# Patient Record
Sex: FEMALE | Race: WHITE | NOT HISPANIC OR LATINO
[De-identification: names, ages, dates, MRNs, and addresses within clinical notes are randomized per-mention and may not be internally consistent; named-entity substitution may affect disease eponyms.]

---

## 2018-01-24 ENCOUNTER — APPOINTMENT (OUTPATIENT)
Dept: RADIOLOGY | Facility: IMAGING CENTER | Age: 68
End: 2018-01-24
Payer: MEDICARE

## 2018-01-24 ENCOUNTER — OUTPATIENT (OUTPATIENT)
Dept: OUTPATIENT SERVICES | Facility: HOSPITAL | Age: 68
LOS: 1 days | End: 2018-01-24
Payer: MEDICARE

## 2018-01-24 ENCOUNTER — APPOINTMENT (OUTPATIENT)
Dept: ULTRASOUND IMAGING | Facility: IMAGING CENTER | Age: 68
End: 2018-01-24
Payer: MEDICARE

## 2018-01-24 ENCOUNTER — APPOINTMENT (OUTPATIENT)
Dept: MAMMOGRAPHY | Facility: IMAGING CENTER | Age: 68
End: 2018-01-24
Payer: MEDICARE

## 2018-01-24 DIAGNOSIS — Z00.8 ENCOUNTER FOR OTHER GENERAL EXAMINATION: ICD-10-CM

## 2018-01-24 PROCEDURE — 77063 BREAST TOMOSYNTHESIS BI: CPT | Mod: 26

## 2018-01-24 PROCEDURE — 77067 SCR MAMMO BI INCL CAD: CPT | Mod: 26

## 2018-01-24 PROCEDURE — 76641 ULTRASOUND BREAST COMPLETE: CPT | Mod: 26,50

## 2018-01-24 PROCEDURE — 76641 ULTRASOUND BREAST COMPLETE: CPT

## 2018-01-24 PROCEDURE — 77080 DXA BONE DENSITY AXIAL: CPT | Mod: 26

## 2018-01-24 PROCEDURE — 77063 BREAST TOMOSYNTHESIS BI: CPT

## 2018-01-24 PROCEDURE — 77067 SCR MAMMO BI INCL CAD: CPT

## 2018-01-24 PROCEDURE — 77080 DXA BONE DENSITY AXIAL: CPT

## 2019-04-05 ENCOUNTER — RX RENEWAL (OUTPATIENT)
Age: 69
End: 2019-04-05

## 2019-05-02 ENCOUNTER — MOBILE ON CALL (OUTPATIENT)
Age: 69
End: 2019-05-02

## 2019-11-06 ENCOUNTER — RX RENEWAL (OUTPATIENT)
Age: 69
End: 2019-11-06

## 2022-02-12 ENCOUNTER — NON-APPOINTMENT (OUTPATIENT)
Age: 72
End: 2022-02-12

## 2022-02-12 ENCOUNTER — APPOINTMENT (OUTPATIENT)
Dept: ORTHOPEDIC SURGERY | Facility: CLINIC | Age: 72
End: 2022-02-12
Payer: MEDICARE

## 2022-02-12 VITALS
SYSTOLIC BLOOD PRESSURE: 143 MMHG | BODY MASS INDEX: 31.18 KG/M2 | HEART RATE: 75 BPM | WEIGHT: 163 LBS | HEIGHT: 60.5 IN | DIASTOLIC BLOOD PRESSURE: 71 MMHG

## 2022-02-12 DIAGNOSIS — M25.551 PAIN IN RIGHT HIP: ICD-10-CM

## 2022-02-12 DIAGNOSIS — M16.11 UNILATERAL PRIMARY OSTEOARTHRITIS, RIGHT HIP: ICD-10-CM

## 2022-02-12 PROCEDURE — 73502 X-RAY EXAM HIP UNI 2-3 VIEWS: CPT | Mod: RT

## 2022-02-12 PROCEDURE — 99204 OFFICE O/P NEW MOD 45 MIN: CPT

## 2022-02-12 RX ORDER — ESTRADIOL 10 UG/1
10 TABLET VAGINAL
Qty: 24 | Refills: 1 | Status: DISCONTINUED | COMMUNITY
Start: 2019-04-05 | End: 2022-02-12

## 2022-02-12 RX ORDER — CHROMIUM 200 MCG
TABLET ORAL
Refills: 0 | Status: ACTIVE | COMMUNITY

## 2022-02-21 ENCOUNTER — TRANSCRIPTION ENCOUNTER (OUTPATIENT)
Age: 72
End: 2022-02-21

## 2022-03-11 ENCOUNTER — OUTPATIENT (OUTPATIENT)
Dept: OUTPATIENT SERVICES | Facility: HOSPITAL | Age: 72
LOS: 1 days | End: 2022-03-11
Payer: MEDICARE

## 2022-03-11 VITALS
OXYGEN SATURATION: 95 % | WEIGHT: 160.06 LBS | SYSTOLIC BLOOD PRESSURE: 143 MMHG | TEMPERATURE: 97 F | DIASTOLIC BLOOD PRESSURE: 81 MMHG | HEIGHT: 59 IN | RESPIRATION RATE: 14 BRPM | HEART RATE: 99 BPM

## 2022-03-11 DIAGNOSIS — M25.551 PAIN IN RIGHT HIP: ICD-10-CM

## 2022-03-11 DIAGNOSIS — Z90.89 ACQUIRED ABSENCE OF OTHER ORGANS: Chronic | ICD-10-CM

## 2022-03-11 DIAGNOSIS — M16.11 UNILATERAL PRIMARY OSTEOARTHRITIS, RIGHT HIP: ICD-10-CM

## 2022-03-11 DIAGNOSIS — Z01.818 ENCOUNTER FOR OTHER PREPROCEDURAL EXAMINATION: ICD-10-CM

## 2022-03-11 LAB
A1C WITH ESTIMATED AVERAGE GLUCOSE RESULT: 5.8 % — HIGH (ref 4–5.6)
ALBUMIN SERPL ELPH-MCNC: 3.8 G/DL — SIGNIFICANT CHANGE UP (ref 3.3–5)
ALP SERPL-CCNC: 70 U/L — SIGNIFICANT CHANGE UP (ref 30–120)
ALT FLD-CCNC: 23 U/L DA — SIGNIFICANT CHANGE UP (ref 10–60)
ANION GAP SERPL CALC-SCNC: 10 MMOL/L — SIGNIFICANT CHANGE UP (ref 5–17)
APTT BLD: 30.9 SEC — SIGNIFICANT CHANGE UP (ref 27.5–35.5)
AST SERPL-CCNC: 17 U/L — SIGNIFICANT CHANGE UP (ref 10–40)
BILIRUB SERPL-MCNC: 0.4 MG/DL — SIGNIFICANT CHANGE UP (ref 0.2–1.2)
BUN SERPL-MCNC: 14 MG/DL — SIGNIFICANT CHANGE UP (ref 7–23)
CALCIUM SERPL-MCNC: 9.1 MG/DL — SIGNIFICANT CHANGE UP (ref 8.4–10.5)
CHLORIDE SERPL-SCNC: 103 MMOL/L — SIGNIFICANT CHANGE UP (ref 96–108)
CO2 SERPL-SCNC: 24 MMOL/L — SIGNIFICANT CHANGE UP (ref 22–31)
CREAT SERPL-MCNC: 0.91 MG/DL — SIGNIFICANT CHANGE UP (ref 0.5–1.3)
EGFR: 67 ML/MIN/1.73M2 — SIGNIFICANT CHANGE UP
ESTIMATED AVERAGE GLUCOSE: 120 MG/DL — HIGH (ref 68–114)
GLUCOSE SERPL-MCNC: 151 MG/DL — HIGH (ref 70–99)
HCT VFR BLD CALC: 42.3 % — SIGNIFICANT CHANGE UP (ref 34.5–45)
HGB BLD-MCNC: 13.9 G/DL — SIGNIFICANT CHANGE UP (ref 11.5–15.5)
INR BLD: 0.96 RATIO — SIGNIFICANT CHANGE UP (ref 0.88–1.16)
MCHC RBC-ENTMCNC: 31 PG — SIGNIFICANT CHANGE UP (ref 27–34)
MCHC RBC-ENTMCNC: 32.9 GM/DL — SIGNIFICANT CHANGE UP (ref 32–36)
MCV RBC AUTO: 94.2 FL — SIGNIFICANT CHANGE UP (ref 80–100)
MRSA PCR RESULT.: SIGNIFICANT CHANGE UP
NRBC # BLD: 0 /100 WBCS — SIGNIFICANT CHANGE UP (ref 0–0)
PLATELET # BLD AUTO: 250 K/UL — SIGNIFICANT CHANGE UP (ref 150–400)
POTASSIUM SERPL-MCNC: 4.1 MMOL/L — SIGNIFICANT CHANGE UP (ref 3.5–5.3)
POTASSIUM SERPL-SCNC: 4.1 MMOL/L — SIGNIFICANT CHANGE UP (ref 3.5–5.3)
PROT SERPL-MCNC: 7.5 G/DL — SIGNIFICANT CHANGE UP (ref 6–8.3)
PROTHROM AB SERPL-ACNC: 11 SEC — SIGNIFICANT CHANGE UP (ref 10.5–13.4)
RBC # BLD: 4.49 M/UL — SIGNIFICANT CHANGE UP (ref 3.8–5.2)
RBC # FLD: 13.4 % — SIGNIFICANT CHANGE UP (ref 10.3–14.5)
S AUREUS DNA NOSE QL NAA+PROBE: SIGNIFICANT CHANGE UP
SODIUM SERPL-SCNC: 137 MMOL/L — SIGNIFICANT CHANGE UP (ref 135–145)
WBC # BLD: 4.94 K/UL — SIGNIFICANT CHANGE UP (ref 3.8–10.5)
WBC # FLD AUTO: 4.94 K/UL — SIGNIFICANT CHANGE UP (ref 3.8–10.5)

## 2022-03-11 PROCEDURE — 36415 COLL VENOUS BLD VENIPUNCTURE: CPT

## 2022-03-11 PROCEDURE — 87640 STAPH A DNA AMP PROBE: CPT

## 2022-03-11 PROCEDURE — 85730 THROMBOPLASTIN TIME PARTIAL: CPT

## 2022-03-11 PROCEDURE — 86900 BLOOD TYPING SEROLOGIC ABO: CPT

## 2022-03-11 PROCEDURE — 86850 RBC ANTIBODY SCREEN: CPT

## 2022-03-11 PROCEDURE — 85027 COMPLETE CBC AUTOMATED: CPT

## 2022-03-11 PROCEDURE — 86901 BLOOD TYPING SEROLOGIC RH(D): CPT

## 2022-03-11 PROCEDURE — 93010 ELECTROCARDIOGRAM REPORT: CPT

## 2022-03-11 PROCEDURE — 85610 PROTHROMBIN TIME: CPT

## 2022-03-11 PROCEDURE — 87641 MR-STAPH DNA AMP PROBE: CPT

## 2022-03-11 PROCEDURE — G0463: CPT

## 2022-03-11 PROCEDURE — 80053 COMPREHEN METABOLIC PANEL: CPT

## 2022-03-11 PROCEDURE — 83036 HEMOGLOBIN GLYCOSYLATED A1C: CPT

## 2022-03-11 PROCEDURE — 93005 ELECTROCARDIOGRAM TRACING: CPT

## 2022-03-11 NOTE — H&P PST ADULT - PROBLEM SELECTOR PLAN 1
right total anterior hip replacement, covid swab 3/28-1 pm; preop instructions given, to go for medical clearance

## 2022-03-11 NOTE — H&P PST ADULT - HISTORY OF PRESENT ILLNESS
this is a 70 y/o female who has had right hip pain for about 6 months, worse when walking, had PT which helped; tests show not much cartilage;  to have right hip replacement

## 2022-03-11 NOTE — H&P PST ADULT - NSANTHOSAYNRD_GEN_A_CORE
No. PEE screening performed.  STOP BANG Legend: 0-2 = LOW Risk; 3-4 = INTERMEDIATE Risk; 5-8 = HIGH Risk

## 2022-03-11 NOTE — H&P PST ADULT - NSICDXPASTMEDICALHX_GEN_ALL_CORE_FT
PAST MEDICAL HISTORY:  H/O cardiac arrhythmia unknown type    Hyperlipidemia     Osteoarthritis

## 2022-03-28 ENCOUNTER — OUTPATIENT (OUTPATIENT)
Dept: OUTPATIENT SERVICES | Facility: HOSPITAL | Age: 72
LOS: 1 days | End: 2022-03-28

## 2022-03-28 DIAGNOSIS — Z90.89 ACQUIRED ABSENCE OF OTHER ORGANS: Chronic | ICD-10-CM

## 2022-03-28 DIAGNOSIS — Z20.828 CONTACT WITH AND (SUSPECTED) EXPOSURE TO OTHER VIRAL COMMUNICABLE DISEASES: ICD-10-CM

## 2022-03-28 PROBLEM — E78.5 HYPERLIPIDEMIA, UNSPECIFIED: Chronic | Status: ACTIVE | Noted: 2022-03-11

## 2022-03-28 PROBLEM — M19.90 UNSPECIFIED OSTEOARTHRITIS, UNSPECIFIED SITE: Chronic | Status: ACTIVE | Noted: 2022-03-11

## 2022-03-28 LAB — SARS-COV-2 RNA SPEC QL NAA+PROBE: SIGNIFICANT CHANGE UP

## 2022-03-29 ENCOUNTER — FORM ENCOUNTER (OUTPATIENT)
Age: 72
End: 2022-03-29

## 2022-03-29 NOTE — PATIENT PROFILE ADULT - FUNCTIONAL ASSESSMENT - BASIC MOBILITY ASSESSMENT TYPE
HISTORY AND PHYSICAL/PRE-SEDATION ASSESSMENT    Patient:  Juve Acuña   :  1961  Medical Record No.:  [id number]   Date:  17  Physician:  Katherine Ayon M.D. Facility: [facility]     Nursing History and Physical reviewed and agreed upon. Additional findings:    Allergies:  Review of patient's allergies indicates no known allergies. Home Medications:    Prior to Admission medications    Medication Sig Start Date End Date Taking? Authorizing Provider   medroxyPROGESTERone (PROVERA) 2.5 MG tablet  17   Historical Provider, MD   doxycycline (VIBRAMYCIN) 50 MG capsule  17   Historical Provider, MD   omeprazole (PRILOSEC) 40 MG capsule Take 40 mg by mouth daily. Historical Provider, MD   Nutritional Supplements (JUICE PLUS FIBRE PO) Take  by mouth. Historical Provider, MD   Multiple Vitamins-Minerals (CENTRUM SILVER PO) Take  by mouth. Historical Provider, MD       Vitals: Stable       PHYSICAL EXAM:  HENT: Airway patent and reviewed  Cardiovascular: Normal rate, regular rhythm, normal heart sounds. Pulmonary/Chest: No wheezes. No rhonchi. No rales. Abdominal: Soft. Bowel sounds are normal. No distension. ASA CLASS:         []   I. Normal, healthy adult           [x]   II.  Mild systemic disease            []   III. Severe systemic disease      Sedation plan:   [x]  Local              []  Minimal                  []  General anesthesia    Patient's condition acceptable for planned procedure/sedation. Post Procedure Plan   Return to same level of care   ______________________     The risks and benefits as well as alternatives to the procedure have been discussed with the patient and or family. The patient and or next of kin understands and agrees to proceed.     Katherine Ayon M.D. Admission

## 2022-03-29 NOTE — PATIENT PROFILE ADULT - FALL HARM RISK - UNIVERSAL INTERVENTIONS
Bed in lowest position, wheels locked, appropriate side rails in place/Call bell, personal items and telephone in reach/Instruct patient to call for assistance before getting out of bed or chair/Non-slip footwear when patient is out of bed/[place] to call system/Physically safe environment - no spills, clutter or unnecessary equipment/Purposeful Proactive Rounding/Room/bathroom lighting operational, light cord in reach

## 2022-03-29 NOTE — PATIENT PROFILE ADULT - NSPREOP1_NPOAFTER_GEN_A_NUR
Spoke with [employer] repWong Vaca to contact preop on 11.17.21 for pt arrival time and arrive at scheduled time DOS. 23:00

## 2022-03-30 ENCOUNTER — INPATIENT (INPATIENT)
Facility: HOSPITAL | Age: 72
LOS: 0 days | Discharge: HOME | DRG: 470 | End: 2022-03-31
Attending: ORTHOPAEDIC SURGERY | Admitting: ORTHOPAEDIC SURGERY
Payer: MEDICARE

## 2022-03-30 ENCOUNTER — TRANSCRIPTION ENCOUNTER (OUTPATIENT)
Age: 72
End: 2022-03-30

## 2022-03-30 ENCOUNTER — RESULT REVIEW (OUTPATIENT)
Age: 72
End: 2022-03-30

## 2022-03-30 ENCOUNTER — APPOINTMENT (OUTPATIENT)
Dept: ORTHOPEDIC SURGERY | Facility: HOSPITAL | Age: 72
End: 2022-03-30

## 2022-03-30 VITALS
HEART RATE: 78 BPM | OXYGEN SATURATION: 100 % | TEMPERATURE: 98 F | WEIGHT: 156.75 LBS | RESPIRATION RATE: 13 BRPM | SYSTOLIC BLOOD PRESSURE: 127 MMHG | DIASTOLIC BLOOD PRESSURE: 68 MMHG | HEIGHT: 60 IN

## 2022-03-30 DIAGNOSIS — M16.11 UNILATERAL PRIMARY OSTEOARTHRITIS, RIGHT HIP: ICD-10-CM

## 2022-03-30 DIAGNOSIS — Z90.89 ACQUIRED ABSENCE OF OTHER ORGANS: Chronic | ICD-10-CM

## 2022-03-30 LAB — ABO RH CONFIRMATION: SIGNIFICANT CHANGE UP

## 2022-03-30 PROCEDURE — 88311 DECALCIFY TISSUE: CPT | Mod: 26

## 2022-03-30 PROCEDURE — 88305 TISSUE EXAM BY PATHOLOGIST: CPT | Mod: 26

## 2022-03-30 PROCEDURE — 99222 1ST HOSP IP/OBS MODERATE 55: CPT

## 2022-03-30 PROCEDURE — 27130 TOTAL HIP ARTHROPLASTY: CPT | Mod: RT

## 2022-03-30 DEVICE — CUP ACET EMPOWR CLUSTER 48MM ALPHA D: Type: IMPLANTABLE DEVICE | Site: RIGHT | Status: FUNCTIONAL

## 2022-03-30 DEVICE — SCREW ACET SZ 6.5X30MM: Type: IMPLANTABLE DEVICE | Site: RIGHT | Status: FUNCTIONAL

## 2022-03-30 DEVICE — HEAD FEM OPTION CERAMIC DELTA 32MM: Type: IMPLANTABLE DEVICE | Site: RIGHT | Status: FUNCTIONAL

## 2022-03-30 DEVICE — SLEEVE BIOLOX DELTA OPTION SZ -3: Type: IMPLANTABLE DEVICE | Site: RIGHT | Status: FUNCTIONAL

## 2022-03-30 DEVICE — IMPLANTABLE DEVICE: Type: IMPLANTABLE DEVICE | Site: RIGHT | Status: FUNCTIONAL

## 2022-03-30 DEVICE — LINER ACET EMPOWR HXE PLUS HOOD 10 DEG 32MM ALPHA D: Type: IMPLANTABLE DEVICE | Site: RIGHT | Status: FUNCTIONAL

## 2022-03-30 DEVICE — BONE WAX 2.5GM: Type: IMPLANTABLE DEVICE | Site: RIGHT | Status: FUNCTIONAL

## 2022-03-30 DEVICE — KWIRE THREADED 4.8MM 6/PK: Type: IMPLANTABLE DEVICE | Site: RIGHT | Status: FUNCTIONAL

## 2022-03-30 RX ORDER — CHLORHEXIDINE GLUCONATE 213 G/1000ML
1 SOLUTION TOPICAL ONCE
Refills: 0 | Status: COMPLETED | OUTPATIENT
Start: 2022-03-30 | End: 2022-03-30

## 2022-03-30 RX ORDER — CELECOXIB 200 MG/1
1 CAPSULE ORAL
Qty: 60 | Refills: 0
Start: 2022-03-30 | End: 2022-04-28

## 2022-03-30 RX ORDER — SODIUM CHLORIDE 9 MG/ML
500 INJECTION INTRAMUSCULAR; INTRAVENOUS; SUBCUTANEOUS ONCE
Refills: 0 | Status: COMPLETED | OUTPATIENT
Start: 2022-03-30 | End: 2022-03-30

## 2022-03-30 RX ORDER — CEFAZOLIN SODIUM 1 G
2000 VIAL (EA) INJECTION EVERY 8 HOURS
Refills: 0 | Status: COMPLETED | OUTPATIENT
Start: 2022-03-30 | End: 2022-03-31

## 2022-03-30 RX ORDER — DEXAMETHASONE 0.5 MG/5ML
8 ELIXIR ORAL ONCE
Refills: 0 | Status: COMPLETED | OUTPATIENT
Start: 2022-03-31 | End: 2022-03-31

## 2022-03-30 RX ORDER — ASPIRIN/CALCIUM CARB/MAGNESIUM 324 MG
81 TABLET ORAL EVERY 12 HOURS
Refills: 0 | Status: DISCONTINUED | OUTPATIENT
Start: 2022-03-31 | End: 2022-03-31

## 2022-03-30 RX ORDER — TRANEXAMIC ACID 100 MG/ML
1000 INJECTION, SOLUTION INTRAVENOUS ONCE
Refills: 0 | Status: COMPLETED | OUTPATIENT
Start: 2022-03-30 | End: 2022-03-30

## 2022-03-30 RX ORDER — APREPITANT 80 MG/1
40 CAPSULE ORAL ONCE
Refills: 0 | Status: COMPLETED | OUTPATIENT
Start: 2022-03-30 | End: 2022-03-30

## 2022-03-30 RX ORDER — ACETAMINOPHEN 500 MG
1000 TABLET ORAL EVERY 8 HOURS
Refills: 0 | Status: DISCONTINUED | OUTPATIENT
Start: 2022-03-31 | End: 2022-03-31

## 2022-03-30 RX ORDER — HYDROMORPHONE HYDROCHLORIDE 2 MG/ML
0.5 INJECTION INTRAMUSCULAR; INTRAVENOUS; SUBCUTANEOUS
Refills: 0 | Status: DISCONTINUED | OUTPATIENT
Start: 2022-03-30 | End: 2022-03-31

## 2022-03-30 RX ORDER — MAGNESIUM HYDROXIDE 400 MG/1
30 TABLET, CHEWABLE ORAL DAILY
Refills: 0 | Status: DISCONTINUED | OUTPATIENT
Start: 2022-03-30 | End: 2022-03-31

## 2022-03-30 RX ORDER — ONDANSETRON 8 MG/1
4 TABLET, FILM COATED ORAL ONCE
Refills: 0 | Status: DISCONTINUED | OUTPATIENT
Start: 2022-03-30 | End: 2022-03-30

## 2022-03-30 RX ORDER — ONDANSETRON 8 MG/1
4 TABLET, FILM COATED ORAL EVERY 6 HOURS
Refills: 0 | Status: DISCONTINUED | OUTPATIENT
Start: 2022-03-30 | End: 2022-03-31

## 2022-03-30 RX ORDER — INFLUENZA VIRUS VACCINE 15; 15; 15; 15 UG/.5ML; UG/.5ML; UG/.5ML; UG/.5ML
0.7 SUSPENSION INTRAMUSCULAR ONCE
Refills: 0 | Status: DISCONTINUED | OUTPATIENT
Start: 2022-03-30 | End: 2022-03-31

## 2022-03-30 RX ORDER — CELECOXIB 200 MG/1
200 CAPSULE ORAL EVERY 12 HOURS
Refills: 0 | Status: DISCONTINUED | OUTPATIENT
Start: 2022-03-30 | End: 2022-03-31

## 2022-03-30 RX ORDER — METOPROLOL TARTRATE 50 MG
25 TABLET ORAL DAILY
Refills: 0 | Status: DISCONTINUED | OUTPATIENT
Start: 2022-03-30 | End: 2022-03-31

## 2022-03-30 RX ORDER — SENNA PLUS 8.6 MG/1
2 TABLET ORAL
Qty: 0 | Refills: 0 | DISCHARGE
Start: 2022-03-30

## 2022-03-30 RX ORDER — OXYCODONE HYDROCHLORIDE 5 MG/1
5 TABLET ORAL
Refills: 0 | Status: DISCONTINUED | OUTPATIENT
Start: 2022-03-30 | End: 2022-03-31

## 2022-03-30 RX ORDER — ACETAMINOPHEN 500 MG
1000 TABLET ORAL ONCE
Refills: 0 | Status: COMPLETED | OUTPATIENT
Start: 2022-03-30 | End: 2022-03-30

## 2022-03-30 RX ORDER — ASPIRIN/CALCIUM CARB/MAGNESIUM 324 MG
1 TABLET ORAL
Qty: 83 | Refills: 0
Start: 2022-03-30 | End: 2022-05-10

## 2022-03-30 RX ORDER — ACETAMINOPHEN 500 MG
1000 TABLET ORAL EVERY 6 HOURS
Refills: 0 | Status: COMPLETED | OUTPATIENT
Start: 2022-03-30 | End: 2022-03-30

## 2022-03-30 RX ORDER — ACETAMINOPHEN 500 MG
2 TABLET ORAL
Qty: 0 | Refills: 0 | DISCHARGE
Start: 2022-03-30

## 2022-03-30 RX ORDER — POLYETHYLENE GLYCOL 3350 17 G/17G
17 POWDER, FOR SOLUTION ORAL AT BEDTIME
Refills: 0 | Status: DISCONTINUED | OUTPATIENT
Start: 2022-03-30 | End: 2022-03-31

## 2022-03-30 RX ORDER — ASPIRIN/CALCIUM CARB/MAGNESIUM 324 MG
1 TABLET ORAL
Qty: 0 | Refills: 0 | DISCHARGE

## 2022-03-30 RX ORDER — ATORVASTATIN CALCIUM 80 MG/1
10 TABLET, FILM COATED ORAL AT BEDTIME
Refills: 0 | Status: DISCONTINUED | OUTPATIENT
Start: 2022-03-30 | End: 2022-03-31

## 2022-03-30 RX ORDER — PANTOPRAZOLE SODIUM 20 MG/1
40 TABLET, DELAYED RELEASE ORAL
Refills: 0 | Status: DISCONTINUED | OUTPATIENT
Start: 2022-03-30 | End: 2022-03-31

## 2022-03-30 RX ORDER — SODIUM CHLORIDE 9 MG/ML
1000 INJECTION, SOLUTION INTRAVENOUS
Refills: 0 | Status: DISCONTINUED | OUTPATIENT
Start: 2022-03-30 | End: 2022-03-30

## 2022-03-30 RX ORDER — POLYETHYLENE GLYCOL 3350 17 G/17G
17 POWDER, FOR SOLUTION ORAL
Qty: 0 | Refills: 0 | DISCHARGE
Start: 2022-03-30

## 2022-03-30 RX ORDER — OMEPRAZOLE 10 MG/1
1 CAPSULE, DELAYED RELEASE ORAL
Qty: 30 | Refills: 1
Start: 2022-03-30 | End: 2022-05-28

## 2022-03-30 RX ORDER — HYDROMORPHONE HYDROCHLORIDE 2 MG/ML
0.5 INJECTION INTRAMUSCULAR; INTRAVENOUS; SUBCUTANEOUS
Refills: 0 | Status: DISCONTINUED | OUTPATIENT
Start: 2022-03-30 | End: 2022-03-30

## 2022-03-30 RX ORDER — OXYCODONE HYDROCHLORIDE 5 MG/1
10 TABLET ORAL
Refills: 0 | Status: DISCONTINUED | OUTPATIENT
Start: 2022-03-30 | End: 2022-03-31

## 2022-03-30 RX ORDER — SODIUM CHLORIDE 9 MG/ML
1000 INJECTION, SOLUTION INTRAVENOUS
Refills: 0 | Status: DISCONTINUED | OUTPATIENT
Start: 2022-03-30 | End: 2022-03-31

## 2022-03-30 RX ORDER — CEFAZOLIN SODIUM 1 G
2000 VIAL (EA) INJECTION ONCE
Refills: 0 | Status: COMPLETED | OUTPATIENT
Start: 2022-03-30 | End: 2022-03-30

## 2022-03-30 RX ORDER — SENNA PLUS 8.6 MG/1
2 TABLET ORAL AT BEDTIME
Refills: 0 | Status: DISCONTINUED | OUTPATIENT
Start: 2022-03-30 | End: 2022-03-31

## 2022-03-30 RX ADMIN — CELECOXIB 200 MILLIGRAM(S): 200 CAPSULE ORAL at 22:58

## 2022-03-30 RX ADMIN — CHLORHEXIDINE GLUCONATE 1 APPLICATION(S): 213 SOLUTION TOPICAL at 08:33

## 2022-03-30 RX ADMIN — POLYETHYLENE GLYCOL 3350 17 GRAM(S): 17 POWDER, FOR SOLUTION ORAL at 21:55

## 2022-03-30 RX ADMIN — APREPITANT 40 MILLIGRAM(S): 80 CAPSULE ORAL at 08:33

## 2022-03-30 RX ADMIN — SODIUM CHLORIDE 100 MILLILITER(S): 9 INJECTION, SOLUTION INTRAVENOUS at 17:37

## 2022-03-30 RX ADMIN — Medication 1000 MILLIGRAM(S): at 23:27

## 2022-03-30 RX ADMIN — SODIUM CHLORIDE 1000 MILLILITER(S): 9 INJECTION INTRAMUSCULAR; INTRAVENOUS; SUBCUTANEOUS at 17:13

## 2022-03-30 RX ADMIN — Medication 400 MILLIGRAM(S): at 17:37

## 2022-03-30 RX ADMIN — CELECOXIB 200 MILLIGRAM(S): 200 CAPSULE ORAL at 21:55

## 2022-03-30 RX ADMIN — SODIUM CHLORIDE 1000 MILLILITER(S): 9 INJECTION INTRAMUSCULAR; INTRAVENOUS; SUBCUTANEOUS at 12:32

## 2022-03-30 RX ADMIN — SENNA PLUS 2 TABLET(S): 8.6 TABLET ORAL at 21:55

## 2022-03-30 RX ADMIN — SODIUM CHLORIDE 75 MILLILITER(S): 9 INJECTION, SOLUTION INTRAVENOUS at 12:31

## 2022-03-30 RX ADMIN — Medication 100 MILLIGRAM(S): at 17:37

## 2022-03-30 RX ADMIN — Medication 1000 MILLIGRAM(S): at 17:38

## 2022-03-30 RX ADMIN — Medication 400 MILLIGRAM(S): at 23:27

## 2022-03-30 NOTE — PHYSICAL THERAPY INITIAL EVALUATION ADULT - GAIT DEVIATIONS NOTED, PT EVAL
decreased gui/decreased velocity of limb motion/decreased step length/decreased weight-shifting ability

## 2022-03-30 NOTE — PRE-OP CHECKLIST - LOOSE TEETH
Due for 6 month follow up  Last 5/27/21.    Patient informed via [employer].    Laverne Rebolledo RN    
no

## 2022-03-30 NOTE — BRIEF OPERATIVE NOTE - NSICDXBRIEFPREOP_GEN_ALL_CORE_FT
PRE-OP DIAGNOSIS:  DJD (degenerative joint disease) of pelvis 30-Mar-2022 12:01:19 Right Jasbir Diaz

## 2022-03-30 NOTE — DISCHARGE NOTE PROVIDER - PROVIDER TOKENS
PROVIDER:[TOKEN:[[id number]],SCHEDULEDAPPT:[04/14/2022],SCHEDULEDAPPTTIME:[10:30 AM],ESTABLISHEDPATIENT:[T]]

## 2022-03-30 NOTE — CONSULT NOTE ADULT - ASSESSMENT
72 y/o female who has had right hip pain for about 6 months s/p THR    Aftercare following surgery  -WBAT  -PT/OT  -Early ambulation  -Pain management  -Incentive Spirometry  -DVT ppx as per ortho    HTN  Metoprolol per ortho protocol     HLD   Statin    Thank you for allowing us to participate in the care of this patient. Our team will continue to follow along with you. Further recommendations to follow pending the clinical course.

## 2022-03-30 NOTE — DISCHARGE NOTE NURSING/CASE MANAGEMENT/SOCIAL WORK - NSSCCONTNUM_GEN_ALL_CORE
[facility] ([facility])   Please contact the home care agency at [phone] if you have not heard from them by 12 noon on the day after your hospital discharge.

## 2022-03-30 NOTE — PROGRESS NOTE ADULT - SUBJECTIVE AND OBJECTIVE BOX
Post Op     ELLA PIÑA      71y        Female                                                                                                                 T(C): 36.3 (03-30-22 @ 12:00), Max: 36.4 (03-30-22 @ 08:12)  HR: 65 (03-30-22 @ 13:30) (59 - 78)  BP: 119/63 (03-30-22 @ 13:30) (93/76 - 127/68)  RR: 18 (03-30-22 @ 13:30) (13 - 19)  SpO2: 100% (03-30-22 @ 13:30) (99% - 100%)  Wt(kg): --    S/P right total hip replacement     Patient denies shortness of breath, chest pain, dyspnea, No complaints  Pain is 3 /10    Physical Exam    Extremity: Bilaterally:  No holmon                                           No Cord                                          PAS on                                          Neurovascular intact                                          Motor intact EHL/FHL                                          Sensation intact                                          Pulses intact DP/PT                                         Calves Soft                                         Dressing Clean / Dry / Intact                                         Capillary refill with 5 seconds                A/P  -- S/P total hip replacement right    -  Medicine To Follow   - DVT prophylaxis PAS  - PT & OT   - Analagesia  - Incentive Spirometry  - Discharge Planning  - Safety Precautions  -  CBC , BMP daily

## 2022-03-30 NOTE — DISCHARGE NOTE NURSING/CASE MANAGEMENT/SOCIAL WORK - NSSCNAMETXT_GEN_ALL_CORE
[facility] [phone] [facility]

## 2022-03-30 NOTE — DISCHARGE NOTE PROVIDER - HOSPITAL COURSE
This patient was admitted to [facility] with a history of severe degenerative joint disease of the right hip.  Patient underwent Pre-Surgical Testing and was medically cleared to undergo elective procedure. Patient underwent right anterior THR by Dr. Zoraida Muñoz on 3/30/22. Procedure was well tolerated.  No operative or srinivas-operative complications arose during patient's hospital course.  Patient received antibiotic according to SCIP guidelines for infection prevention.  Aspirin 81mg q 12 h was given for DVT prophylaxis, in addition to the use of SCDs.  Anesthesia, Medical Hospitalist, Physical Therapy and Occupational Therapy were consulted. Patient is stable for discharge with a good prognosis.  Appropriate discharge instructions and medications are provided in this document.

## 2022-03-30 NOTE — DISCHARGE NOTE PROVIDER - CARE PROVIDER_API CALL
Marylou Young (NP; RN)  NP in Adult Health  [address], [address]  [place] [address]  Phone: [phone]  Fax: [phone]  Established Patient  Scheduled Appointment: 04/14/2022 10:30 AM

## 2022-03-30 NOTE — DISCHARGE NOTE PROVIDER - NSDCCPGOAL_GEN_ALL_CORE_FT
To get better and follow your care plan as instructed. Improve ambulation, ADLs and quality of life.  Shoulder/back/chest pain relapsed ALL

## 2022-03-30 NOTE — DISCHARGE NOTE PROVIDER - NSDCCPCAREPLAN_GEN_ALL_CORE_FT
PRINCIPAL DISCHARGE DIAGNOSIS  Diagnosis: Primary osteoarthritis of right hip  Assessment and Plan of Treatment: You have had an Anterior Total Hip Replacement. Follow instructions given to you by your surgeon.   PT/OT Total Hip Protocol; Ambulation, transfers, stairs, & ADLs  Full weight bearing both legs; Walker/cane use as instructed by PT/OT  Anterior THR precautions for 4 weeks: No straight leg raise; No external rotation of hip when extended-standing or lying flat; No hyperextension of hip when standing (kickback)  • Ice 20 minutes several times daily with at least a 20 minute break in between icing sessions  Silverlon dressing is waterproof.  You may shower, but do not aim shower stream at surgical site. Pat dry after shower.   Remove Silverlon dressing on postop day #7. May shower after Silverlon removal.  Keep incision clean. DO NOT APPLY ANYTHING to incision site (salves/ointments/creams).  May shower.  Do not scrub incision site. Pat dry after shower. Prineo dressing will be removed at surgeon's office during first post-op appointment, 2 weeks after surgery.

## 2022-03-30 NOTE — DISCHARGE NOTE PROVIDER - NSDCMRMEDTOKEN_GEN_ALL_CORE_FT
aspirin 81 mg oral delayed release tablet: 1 tab orally every 12 hours. Take 2 hours before Celebrex.   aspirin 81 mg oral tablet: 1 tab(s) orally 2 times a week  celecoxib 200 mg oral capsule: 1 cap orally every 12 hours. Take 2 hours after Aspirin.  metoprolol succinate 25 mg oral capsule, extended release: 1 cap(s) orally once a day  omeprazole 20 mg oral delayed release capsule: 1 cap orally once a day   pravastatin 40 mg oral tablet: 1 tab(s) orally once a day  Vitamin D2 1.25 mg (50,000 intl units) oral capsule: 1 cap(s) orally every 2 weeks   acetaminophen 500 mg oral tablet: 2 tab(s) orally every 8 hours  aspirin 81 mg oral delayed release tablet: 1 tab orally every 12 hours. Take 2 hours before Celebrex.   celecoxib 200 mg oral capsule: 1 cap orally every 12 hours. Take 2 hours after Aspirin.  metoprolol succinate 25 mg oral capsule, extended release: 1 cap(s) orally once a day  omeprazole 20 mg oral delayed release capsule: 1 cap orally once a day   oxyCODONE 5 mg oral tablet: 1-2 tab(s) orally every 4 hours, As Needed -Moderate to severe Pain MDD:6  [id number]  polyethylene glycol 3350 oral powder for reconstitution: 17 gram(s) orally once a day (at bedtime), As Needed  pravastatin 40 mg oral tablet: 1 tab(s) orally once a day  senna oral tablet: 2 tab(s) orally once a day (at bedtime), As Needed  Vitamin D2 1.25 mg (50,000 intl units) oral capsule: 1 cap(s) orally every 2 weeks

## 2022-03-30 NOTE — BRIEF OPERATIVE NOTE - NSICDXBRIEFPOSTOP_GEN_ALL_CORE_FT
POST-OP DIAGNOSIS:  DJD (degenerative joint disease) of pelvis 30-Mar-2022 12:01:21 Right Jasbir Diaz

## 2022-03-30 NOTE — DISCHARGE NOTE PROVIDER - NSDCFUSCHEDAPPT_GEN_ALL_CORE_FT
ELLA PIÑA ; 04/14/2022 ; [facility] [address] [place] [place]  ELLA PIÑA ; 05/31/2022 ; [facility] [address] [place] [address]

## 2022-03-30 NOTE — PHYSICAL THERAPY INITIAL EVALUATION ADULT - ADDITIONAL COMMENTS
Pt will be living with friend post d/c. House has 2 VALERIO without handrail and no steps inside. Pt was independent prior to surgery. Pt has RW.

## 2022-03-30 NOTE — DISCHARGE NOTE NURSING/CASE MANAGEMENT/SOCIAL WORK - NSDCDMETYPESERV_GEN_ALL_CORE_FT
You confirmed that you a rolling walker and commode at home You confirmed that you have a rolling walker and commode at home

## 2022-03-30 NOTE — DISCHARGE NOTE NURSING/CASE MANAGEMENT/SOCIAL WORK - PATIENT PORTAL LINK FT
You can access the FollowMyHealth Patient Portal offered by [facility] by registering at the following website: [email or web address]. By joining [employer]’s FollowMyHealth portal, you will also be able to view your health information using other applications (apps) compatible with our system.

## 2022-03-30 NOTE — CONSULT NOTE ADULT - SUBJECTIVE AND OBJECTIVE BOX
Patient is a 71y old  Female who presents with a chief complaint of Right anterior THR for severe right hip OA.   (30 Mar 2022 12:19)      FROM ADMISSION H+P:   HPI: 70 y/o female who has had right hip pain for about 6 months, worse when walking, had PT which helped; tests show not much cartilage. Patient seen post op. Tolerated procedure well without complications. C/o mild pain at surgical site. Denies any new complaints.      ----  PAST MEDICAL & SURGICAL HISTORY:  H/O cardiac arrhythmia  unknown type    Hyperlipidemia    Osteoarthritis    S/P tonsillectomy        ----  Home medications:  aspirin 81 mg oral tablet: 1 tab(s) orally 2 times a week (30 Mar 2022 08:31)  metoprolol succinate 25 mg oral capsule, extended release: 1 cap(s) orally once a day (30 Mar 2022 08:31)  pravastatin 40 mg oral tablet: 1 tab(s) orally once a day (30 Mar 2022 08:31)  Vitamin D2 1.25 mg (50,000 intl units) oral capsule: 1 cap(s) orally every 2 weeks (30 Mar 2022 08:31)    ----  FAMILY HISTORY:  FH: hyperlipidemia (Mother)        ----  Allergies    No Known Allergies    Intolerances        ----  Social History:  Denies current alcohol, tobacco or drug use     ----  REVIEW OF SYSTEMS:  CONSTITUTIONAL: denies fever, chills, fatigue, weakness  HEENT: denies blurred vision, sore throat  SKIN: denies new lesions, rash  CARDIOVASCULAR: denies chest pain, chest pressure, palpitations  RESPIRATORY: denies shortness of breath, sputum production  GASTROINTESTINAL: denies nausea, vomiting, diarrhea, abdominal pain  GENITOURINARY: denies dysuria, discharge  NEUROLOGICAL: denies numbness, headache, focal weakness  MUSCULOSKELETAL: denies new joint pain, muscle aches  HEMATOLOGIC: denies gross bleeding, bruising    ----  PHYSICAL EXAM:  GENERAL: patient appears well, no acute distress, appropriately interactive  EYES: sclera clear, no exudates  LUNGS: good air entry bilaterally, clear to auscultation, symmetric breath sounds  HEART: soft S1/S2, regular rate and rhythm, no murmurs noted, no noted edema to bilateral lower extremities  GASTROINTESTINAL: abdomen is soft, nontender, nondistended, normoactive bowel sounds, no palpable masses  INTEGUMENT: good skin turgor, appropriate for ethnicity, appears well perfused, no jaundice noted  MUSCULOSKELETAL: dressing c/d/i, no clubbing or cyanosis, no obvious deformity  NEUROLOGIC: awake, alert, oriented x3, good muscle tone in 4 extremities, no obvious sensory deficits  PSYCHIATRIC: mood is good, affect is congruent with mood, linear and logical thought process    T(C): 36.3 (03-30-22 @ 12:00), Max: 36.4 (03-30-22 @ 08:12)  HR: 64 (03-30-22 @ 15:00) (59 - 78)  BP: 103/48 (03-30-22 @ 15:00) (93/76 - 127/68)  RR: 18 (03-30-22 @ 15:00) (13 - 20)  SpO2: 99% (03-30-22 @ 15:00) (99% - 100%)  Wt(kg): --    ----  INPATIENT MEDICATIONS  HYDROmorphone  Injectable 0.5 milliGRAM(s) IV Push every 10 minutes PRN  influenza  Vaccine (HIGH DOSE) 0.7 milliLiter(s) IntraMuscular once  lactated ringers. 1000 milliLiter(s) IV Continuous <Continuous>  ondansetron Injectable 4 milliGRAM(s) IV Push once PRN      ----  I&O's Summary    30 Mar 2022 07:01  -  30 Mar 2022 15:09  --------------------------------------------------------  IN: 2000 mL / OUT: 150 mL / NET: 1850 mL        LABS:              CAPILLARY BLOOD GLUCOSE                    ----  Personally reviewed:  Vital sign trends: [ x ] yes    [  ] no     [  ] n/a  Laboratory results: [x  ] yes    [  ] no     [  ] n/a

## 2022-03-31 VITALS
RESPIRATION RATE: 18 BRPM | OXYGEN SATURATION: 96 % | SYSTOLIC BLOOD PRESSURE: 138 MMHG | DIASTOLIC BLOOD PRESSURE: 69 MMHG | TEMPERATURE: 98 F | HEART RATE: 68 BPM

## 2022-03-31 LAB
ANION GAP SERPL CALC-SCNC: 10 MMOL/L — SIGNIFICANT CHANGE UP (ref 5–17)
BUN SERPL-MCNC: 12 MG/DL — SIGNIFICANT CHANGE UP (ref 7–23)
CALCIUM SERPL-MCNC: 8.5 MG/DL — SIGNIFICANT CHANGE UP (ref 8.4–10.5)
CHLORIDE SERPL-SCNC: 107 MMOL/L — SIGNIFICANT CHANGE UP (ref 96–108)
CO2 SERPL-SCNC: 22 MMOL/L — SIGNIFICANT CHANGE UP (ref 22–31)
CREAT SERPL-MCNC: 0.82 MG/DL — SIGNIFICANT CHANGE UP (ref 0.5–1.3)
EGFR: 75 ML/MIN/1.73M2 — SIGNIFICANT CHANGE UP
GLUCOSE SERPL-MCNC: 129 MG/DL — HIGH (ref 70–99)
HCT VFR BLD CALC: 35.6 % — SIGNIFICANT CHANGE UP (ref 34.5–45)
HGB BLD-MCNC: 11.8 G/DL — SIGNIFICANT CHANGE UP (ref 11.5–15.5)
MCHC RBC-ENTMCNC: 31.6 PG — SIGNIFICANT CHANGE UP (ref 27–34)
MCHC RBC-ENTMCNC: 33.1 GM/DL — SIGNIFICANT CHANGE UP (ref 32–36)
MCV RBC AUTO: 95.4 FL — SIGNIFICANT CHANGE UP (ref 80–100)
NRBC # BLD: 0 /100 WBCS — SIGNIFICANT CHANGE UP (ref 0–0)
PLATELET # BLD AUTO: 190 K/UL — SIGNIFICANT CHANGE UP (ref 150–400)
POTASSIUM SERPL-MCNC: 4.1 MMOL/L — SIGNIFICANT CHANGE UP (ref 3.5–5.3)
POTASSIUM SERPL-SCNC: 4.1 MMOL/L — SIGNIFICANT CHANGE UP (ref 3.5–5.3)
RBC # BLD: 3.73 M/UL — LOW (ref 3.8–5.2)
RBC # FLD: 13.4 % — SIGNIFICANT CHANGE UP (ref 10.3–14.5)
SODIUM SERPL-SCNC: 139 MMOL/L — SIGNIFICANT CHANGE UP (ref 135–145)
WBC # BLD: 10.57 K/UL — HIGH (ref 3.8–10.5)
WBC # FLD AUTO: 10.57 K/UL — HIGH (ref 3.8–10.5)

## 2022-03-31 PROCEDURE — C1776: CPT

## 2022-03-31 PROCEDURE — 27130 TOTAL HIP ARTHROPLASTY: CPT

## 2022-03-31 PROCEDURE — 97165 OT EVAL LOW COMPLEX 30 MIN: CPT

## 2022-03-31 PROCEDURE — 80048 BASIC METABOLIC PNL TOTAL CA: CPT

## 2022-03-31 PROCEDURE — 36415 COLL VENOUS BLD VENIPUNCTURE: CPT

## 2022-03-31 PROCEDURE — 97161 PT EVAL LOW COMPLEX 20 MIN: CPT

## 2022-03-31 PROCEDURE — 85027 COMPLETE CBC AUTOMATED: CPT

## 2022-03-31 PROCEDURE — 97530 THERAPEUTIC ACTIVITIES: CPT

## 2022-03-31 PROCEDURE — 99238 HOSP IP/OBS DSCHRG MGMT 30/<: CPT

## 2022-03-31 PROCEDURE — U0003: CPT

## 2022-03-31 PROCEDURE — 88305 TISSUE EXAM BY PATHOLOGIST: CPT

## 2022-03-31 PROCEDURE — C1889: CPT

## 2022-03-31 PROCEDURE — C1713: CPT

## 2022-03-31 PROCEDURE — 97110 THERAPEUTIC EXERCISES: CPT

## 2022-03-31 PROCEDURE — U0005: CPT

## 2022-03-31 PROCEDURE — 97116 GAIT TRAINING THERAPY: CPT

## 2022-03-31 PROCEDURE — 88311 DECALCIFY TISSUE: CPT

## 2022-03-31 PROCEDURE — 76000 FLUOROSCOPY <1 HR PHYS/QHP: CPT

## 2022-03-31 PROCEDURE — 94664 DEMO&/EVAL PT USE INHALER: CPT

## 2022-03-31 RX ORDER — OXYCODONE HYDROCHLORIDE 5 MG/1
1 TABLET ORAL
Qty: 42 | Refills: 0
Start: 2022-03-31 | End: 2022-04-06

## 2022-03-31 RX ADMIN — Medication 25 MILLIGRAM(S): at 05:14

## 2022-03-31 RX ADMIN — CELECOXIB 200 MILLIGRAM(S): 200 CAPSULE ORAL at 09:40

## 2022-03-31 RX ADMIN — Medication 1000 MILLIGRAM(S): at 05:21

## 2022-03-31 RX ADMIN — Medication 1000 MILLIGRAM(S): at 13:56

## 2022-03-31 RX ADMIN — Medication 1000 MILLIGRAM(S): at 05:14

## 2022-03-31 RX ADMIN — PANTOPRAZOLE SODIUM 40 MILLIGRAM(S): 20 TABLET, DELAYED RELEASE ORAL at 05:14

## 2022-03-31 RX ADMIN — CELECOXIB 200 MILLIGRAM(S): 200 CAPSULE ORAL at 09:41

## 2022-03-31 RX ADMIN — Medication 81 MILLIGRAM(S): at 05:14

## 2022-03-31 RX ADMIN — Medication 101.6 MILLIGRAM(S): at 05:14

## 2022-03-31 RX ADMIN — Medication 100 MILLIGRAM(S): at 01:35

## 2022-03-31 NOTE — PROGRESS NOTE ADULT - SUBJECTIVE AND OBJECTIVE BOX
Patient is a 71y old  Female who presents with a chief complaint of right hip pain--for right ant DEEDEE (31 Mar 2022 07:38)      INTERVAL HPI/OVERNIGHT EVENTS: Patient seen and examined at bedside. No overnight events.      MEDICATIONS  (STANDING):  acetaminophen     Tablet .. 1000 milliGRAM(s) Oral every 8 hours  aspirin enteric coated 81 milliGRAM(s) Oral every 12 hours  atorvastatin 10 milliGRAM(s) Oral at bedtime  celecoxib 200 milliGRAM(s) Oral every 12 hours  influenza  Vaccine (HIGH DOSE) 0.7 milliLiter(s) IntraMuscular once  lactated ringers. 1000 milliLiter(s) (100 mL/Hr) IV Continuous <Continuous>  metoprolol succinate ER 25 milliGRAM(s) Oral daily  pantoprazole    Tablet 40 milliGRAM(s) Oral before breakfast  polyethylene glycol 3350 17 Gram(s) Oral at bedtime  senna 2 Tablet(s) Oral at bedtime    MEDICATIONS  (PRN):  HYDROmorphone  Injectable 0.5 milliGRAM(s) IV Push every 3 hours PRN breakthrough pain  magnesium hydroxide Suspension 30 milliLiter(s) Oral daily PRN Constipation  ondansetron Injectable 4 milliGRAM(s) IV Push every 6 hours PRN Nausea and/or Vomiting  oxyCODONE    IR 5 milliGRAM(s) Oral every 3 hours PRN Moderate Pain (4 - 6)  oxyCODONE    IR 10 milliGRAM(s) Oral every 3 hours PRN Severe Pain (7 - 10)      Allergies    No Known Allergies    Intolerances        REVIEW OF SYSTEMS:  CONSTITUTIONAL: No fever or chills  HEENT:  No headache, no sore throat  RESPIRATORY: No cough, wheezing, or shortness of breath  CARDIOVASCULAR: No chest pain, palpitations, or leg swelling  GASTROINTESTINAL: No abd pain, nausea, vomiting, or diarrhea  GENITOURINARY: No dysuria, frequency, or hematuria  NEUROLOGICAL: no focal weakness or dizziness  MUSCULOSKELETAL: no myalgias     Vital Signs Last 24 Hrs  T(C): 36.7 (31 Mar 2022 07:25), Max: 37.1 (30 Mar 2022 19:24)  T(F): 98 (31 Mar 2022 07:25), Max: 98.7 (30 Mar 2022 19:24)  HR: 64 (31 Mar 2022 07:25) (59 - 88)  BP: 106/64 (31 Mar 2022 07:25) (93/76 - 125/51)  BP(mean): --  RR: 18 (31 Mar 2022 07:25) (16 - 24)  SpO2: 96% (31 Mar 2022 07:25) (94% - 100%)    PHYSICAL EXAM:  GENERAL: NAD  HEENT:  EOMI, moist mucous membranes  CHEST/LUNG:  CTA b/l, no rales, wheezes, or rhonchi  HEART:  RRR, S1, S2  ABDOMEN:  BS+, soft, nontender, nondistended  EXTREMITIES: dressing c/d/i, no edema, cyanosis, or calf tenderness  NERVOUS SYSTEM: AA&Ox3    LABS:                        11.8   10.57 )-----------( 190      ( 31 Mar 2022 07:43 )             35.6     CBC Full  -  ( 31 Mar 2022 07:43 )  WBC Count : 10.57 K/uL  Hemoglobin : 11.8 g/dL  Hematocrit : 35.6 %  Platelet Count - Automated : 190 K/uL  Mean Cell Volume : 95.4 fl  Mean Cell Hemoglobin : 31.6 pg  Mean Cell Hemoglobin Concentration : 33.1 gm/dL  Auto Neutrophil # : x  Auto Lymphocyte # : x  Auto Monocyte # : x  Auto Eosinophil # : x  Auto Basophil # : x  Auto Neutrophil % : x  Auto Lymphocyte % : x  Auto Monocyte % : x  Auto Eosinophil % : x  Auto Basophil % : x    31 Mar 2022 07:43    139    |  107    |  12     ----------------------------<  129    4.1     |  22     |  0.82     Ca    8.5        31 Mar 2022 07:43          CAPILLARY BLOOD GLUCOSE              RADIOLOGY & ADDITIONAL TESTS:    Consultant(s) Notes Reviewed:  [x] YES  [ ] NO    Care Discussed with [x] Consultants  [x] Patient  [ ] Family  [ ]      [ x] Other; RN  DVT ppx

## 2022-03-31 NOTE — PROGRESS NOTE ADULT - ASSESSMENT
70 y/o female who has had right hip pain for about 6 months s/p THR    Aftercare following surgery  -WBAT  -PT/OT  -Early ambulation  -Pain management  -Incentive Spirometry  -DVT ppx as per ortho    HTN  Metoprolol per ortho protocol     HLD   Statin    Patient is medically stable for discharge with close outpatient follow up once cleared by PT and ortho

## 2022-03-31 NOTE — OCCUPATIONAL THERAPY INITIAL EVALUATION ADULT - REHAB POTENTIAL, OT EVAL
Artificial Tears: One drop to both eyes 3-4 times daily. We recommend Systane or Refresh lubricating eye drops which can be found at any pharmacy. good, to achieve stated therapy goals

## 2022-03-31 NOTE — PROGRESS NOTE ADULT - SUBJECTIVE AND OBJECTIVE BOX
Discharge medication calendar:  (ASA 81mg BIW preop)  Aspirin EC 81mg q12h x 6 weeks then resume ASA 81mg BIW  APAP 1000mg q8h x 2-3 weeks  Celecoxib 200mg q12h x 21 days  Omeprazole 20mg QAM x 6 weeks  Narcotic PRN  Docusate 100mg TID while taking narcotic  Miralax, Senna, or Bisacodyl PRN for treatment of constipation

## 2022-03-31 NOTE — OCCUPATIONAL THERAPY INITIAL EVALUATION ADULT - ADDITIONAL COMMENTS
Pt lives alone. Pt will stay with a friend upon d/c friend's home has 2 VALERIO no railing +stall  Pt owns a commode and RW

## 2022-03-31 NOTE — PROGRESS NOTE ADULT - SUBJECTIVE AND OBJECTIVE BOX
Procedure: Right  Anterior THR  POD#: 1    S: Pt without complaints. No SOB,CP, N/V. Tolerated Fluids / Diet well.   Pain comfortable on Interval Rx  + Tylenol + Celebrex. No BM yet  + flatus, No abdominal pain.  No oxy used as yet  Pain Rx:   acetaminophen     Tablet .. 1000 milliGRAM(s) Oral every 8 hours  celecoxib 200 milliGRAM(s) Oral every 12 hours  HYDROmorphone  Injectable 0.5 milliGRAM(s) IV Push every 3 hours PRN  ondansetron Injectable 4 milliGRAM(s) IV Push every 6 hours PRN  oxyCODONE    IR 5 milliGRAM(s) Oral every 3 hours PRN  oxyCODONE    IR 10 milliGRAM(s) Oral every 3 hours PRN    O: General: Pt Alert and oriented, On exam NAD,   VS: Vital Signs Last 24 Hrs  T(C): 36.7 (31 Mar 2022 07:25), Max: 37.1 (30 Mar 2022 19:24)  T(F): 98 (31 Mar 2022 07:25), Max: 98.7 (30 Mar 2022 19:24)  HR: 64 (31 Mar 2022 07:25) (59 - 88)  BP: 106/64 (31 Mar 2022 07:25) (93/76 - 127/68)  BP(mean): --  RR: 18 (31 Mar 2022 07:25) (13 - 24)  SpO2: 96% (31 Mar 2022 07:25) (94% - 100%)  Heart: RRR  Lungs: BS clear bilat.  Abdomen: Soft; no distention, benign exam    Ext: Right bhip silverlon clean and dry.  thigh soft.  Neurologic: Has sensation bilat. feet & toes ;  Full AROM bilat feet & toes. EHL / AT  = Bilat: 5/5 ; Sensation Present mid lateral thigh  Vascular: Feet toes warm, pink. DP = 2+. Calves soft ; w/o tenderness bilat..  VTEP: On Bilat. Venodynes +   aspirin enteric coated 81 milliGRAM(s) Oral every 12 hours    H.O Prophylaxis: Celebrex 200mg BID - Goal 21 Days   Activity in PT Noted.  Walked               Hospitalist input noted    Primary Orthopedic Assessment:  • Stable from Orthopedic perspective  • Neuro motor exam stable:   • Labs: pending       Plan:   • Continue:  PT/OT/Weightbearing as tolerated with assistance of a walker/Anterior THR precautions/Ice to hip/          Incentive spirometry encouraged / Celebrex for HO PPX  • Continue DVT prophylaxis as prescribed, including use of compression devices and ankle pumps  • Continue Pain Rx  • Anterior hip precautions reviewed with patient  • Plans per Medicine  • Discharge planning – anticipated discharge is Home D/C with home care & home PT  when medically stable & cleared by PT/OT--today     Procedure: Right  Anterior THR  POD#: 1    S: Pt without complaints. No SOB,CP, N/V. Tolerated Fluids / Diet well.   Pain comfortable on Interval Rx  + Tylenol + Celebrex. No BM yet  + flatus, No abdominal pain.  No oxy used as yet  Pain Rx:   acetaminophen     Tablet .. 1000 milliGRAM(s) Oral every 8 hours  celecoxib 200 milliGRAM(s) Oral every 12 hours  HYDROmorphone  Injectable 0.5 milliGRAM(s) IV Push every 3 hours PRN  ondansetron Injectable 4 milliGRAM(s) IV Push every 6 hours PRN  oxyCODONE    IR 5 milliGRAM(s) Oral every 3 hours PRN  oxyCODONE    IR 10 milliGRAM(s) Oral every 3 hours PRN    O: General: Pt Alert and oriented, On exam NAD,   VS: Vital Signs Last 24 Hrs  T(C): 36.7 (31 Mar 2022 07:25), Max: 37.1 (30 Mar 2022 19:24)  T(F): 98 (31 Mar 2022 07:25), Max: 98.7 (30 Mar 2022 19:24)  HR: 64 (31 Mar 2022 07:25) (59 - 88)  BP: 106/64 (31 Mar 2022 07:25) (93/76 - 127/68)  BP(mean): --  RR: 18 (31 Mar 2022 07:25) (13 - 24)  SpO2: 96% (31 Mar 2022 07:25) (94% - 100%)  Heart: RRR  Lungs: BS clear bilat.  Abdomen: Soft; no distention, benign exam    Ext: Right bhip silverlon clean and dry.  thigh soft.  Neurologic: Has sensation bilat. feet & toes ;  Full AROM bilat feet & toes. EHL / AT  = Bilat: 5/5 ; Sensation Present mid lateral thigh  Vascular: Feet toes warm, pink. DP = 2+. Calves soft ; w/o tenderness bilat..  VTEP: On Bilat. Venodynes +   aspirin enteric coated 81 milliGRAM(s) Oral every 12 hours    H.O Prophylaxis: Celebrex 200mg BID - Goal 21 Days   Activity in PT : Walked 75'              Hospitalist input noted    Primary Orthopedic Assessment:  • Stable from Orthopedic perspective  • Neuro motor exam stable:   • Labs: pending       Plan:   • Continue:  PT/OT/Weightbearing as tolerated with assistance of a walker/Anterior THR precautions/Ice to hip/          Incentive spirometry encouraged / Celebrex for HO PPX  • Continue DVT prophylaxis as prescribed, including use of compression devices and ankle pumps  • Continue Pain Rx  • Anterior hip precautions reviewed with patient  • Plans per Medicine  • Discharge planning – anticipated discharge is Home D/C with home care & home PT  when medically stable & cleared by PT/OT--today

## 2022-04-14 ENCOUNTER — APPOINTMENT (OUTPATIENT)
Dept: ORTHOPEDIC SURGERY | Facility: CLINIC | Age: 72
End: 2022-04-14
Payer: MEDICARE

## 2022-04-14 VITALS — HEART RATE: 83 BPM | SYSTOLIC BLOOD PRESSURE: 165 MMHG | DIASTOLIC BLOOD PRESSURE: 73 MMHG

## 2022-04-14 DIAGNOSIS — Z96.641 PRESENCE OF RIGHT ARTIFICIAL HIP JOINT: ICD-10-CM

## 2022-04-14 PROCEDURE — 73502 X-RAY EXAM HIP UNI 2-3 VIEWS: CPT | Mod: RT

## 2022-04-14 PROCEDURE — 99024 POSTOP FOLLOW-UP VISIT: CPT

## 2022-04-27 ENCOUNTER — OUTPATIENT (OUTPATIENT)
Dept: OUTPATIENT SERVICES | Facility: HOSPITAL | Age: 72
LOS: 1 days | End: 2022-04-27
Payer: MEDICARE

## 2022-04-27 ENCOUNTER — APPOINTMENT (OUTPATIENT)
Dept: MAMMOGRAPHY | Facility: IMAGING CENTER | Age: 72
End: 2022-04-27

## 2022-04-27 DIAGNOSIS — Z90.89 ACQUIRED ABSENCE OF OTHER ORGANS: Chronic | ICD-10-CM

## 2022-04-27 DIAGNOSIS — Z00.8 ENCOUNTER FOR OTHER GENERAL EXAMINATION: ICD-10-CM

## 2022-04-27 PROCEDURE — 77067 SCR MAMMO BI INCL CAD: CPT

## 2022-04-27 PROCEDURE — 77067 SCR MAMMO BI INCL CAD: CPT | Mod: 26

## 2022-04-27 PROCEDURE — 77063 BREAST TOMOSYNTHESIS BI: CPT

## 2022-04-27 PROCEDURE — 77063 BREAST TOMOSYNTHESIS BI: CPT | Mod: 26

## 2022-05-27 ENCOUNTER — APPOINTMENT (OUTPATIENT)
Dept: ULTRASOUND IMAGING | Facility: IMAGING CENTER | Age: 72
End: 2022-05-27
Payer: MEDICARE

## 2022-05-27 ENCOUNTER — OUTPATIENT (OUTPATIENT)
Dept: OUTPATIENT SERVICES | Facility: HOSPITAL | Age: 72
LOS: 1 days | End: 2022-05-27
Payer: MEDICARE

## 2022-05-27 ENCOUNTER — APPOINTMENT (OUTPATIENT)
Dept: MAMMOGRAPHY | Facility: IMAGING CENTER | Age: 72
End: 2022-05-27
Payer: MEDICARE

## 2022-05-27 DIAGNOSIS — Z00.8 ENCOUNTER FOR OTHER GENERAL EXAMINATION: ICD-10-CM

## 2022-05-27 DIAGNOSIS — Z90.89 ACQUIRED ABSENCE OF OTHER ORGANS: Chronic | ICD-10-CM

## 2022-05-27 PROCEDURE — 77065 DX MAMMO INCL CAD UNI: CPT

## 2022-05-27 PROCEDURE — 76641 ULTRASOUND BREAST COMPLETE: CPT | Mod: 26,RT

## 2022-05-27 PROCEDURE — 77065 DX MAMMO INCL CAD UNI: CPT | Mod: 26,RT

## 2022-05-27 PROCEDURE — 76641 ULTRASOUND BREAST COMPLETE: CPT

## 2022-05-31 ENCOUNTER — APPOINTMENT (OUTPATIENT)
Dept: ORTHOPEDIC SURGERY | Facility: CLINIC | Age: 72
End: 2022-05-31
Payer: MEDICARE

## 2022-05-31 VITALS — HEART RATE: 80 BPM | SYSTOLIC BLOOD PRESSURE: 103 MMHG | DIASTOLIC BLOOD PRESSURE: 65 MMHG

## 2022-05-31 PROCEDURE — 99024 POSTOP FOLLOW-UP VISIT: CPT

## 2022-05-31 PROCEDURE — 73502 X-RAY EXAM HIP UNI 2-3 VIEWS: CPT | Mod: RT

## 2022-06-09 ENCOUNTER — OUTPATIENT (OUTPATIENT)
Dept: OUTPATIENT SERVICES | Facility: HOSPITAL | Age: 72
LOS: 1 days | End: 2022-06-09
Payer: MEDICARE

## 2022-06-09 ENCOUNTER — RESULT REVIEW (OUTPATIENT)
Age: 72
End: 2022-06-09

## 2022-06-09 ENCOUNTER — APPOINTMENT (OUTPATIENT)
Dept: ULTRASOUND IMAGING | Facility: IMAGING CENTER | Age: 72
End: 2022-06-09
Payer: MEDICARE

## 2022-06-09 DIAGNOSIS — Z00.8 ENCOUNTER FOR OTHER GENERAL EXAMINATION: ICD-10-CM

## 2022-06-09 DIAGNOSIS — Z90.89 ACQUIRED ABSENCE OF OTHER ORGANS: Chronic | ICD-10-CM

## 2022-06-09 PROCEDURE — 19083 BX BREAST 1ST LESION US IMAG: CPT

## 2022-06-09 PROCEDURE — 88360 TUMOR IMMUNOHISTOCHEM/MANUAL: CPT

## 2022-06-09 PROCEDURE — 88305 TISSUE EXAM BY PATHOLOGIST: CPT | Mod: 26

## 2022-06-09 PROCEDURE — 88305 TISSUE EXAM BY PATHOLOGIST: CPT

## 2022-06-09 PROCEDURE — 19083 BX BREAST 1ST LESION US IMAG: CPT | Mod: RT

## 2022-06-09 PROCEDURE — A4648: CPT

## 2022-06-09 PROCEDURE — 77065 DX MAMMO INCL CAD UNI: CPT

## 2022-06-09 PROCEDURE — 88360 TUMOR IMMUNOHISTOCHEM/MANUAL: CPT | Mod: 26

## 2022-06-09 PROCEDURE — 77065 DX MAMMO INCL CAD UNI: CPT | Mod: 26,RT

## 2022-06-15 ENCOUNTER — APPOINTMENT (OUTPATIENT)
Dept: SURGERY | Facility: CLINIC | Age: 72
End: 2022-06-15
Payer: MEDICARE

## 2022-06-15 PROCEDURE — 99205K: CUSTOM

## 2022-06-16 ENCOUNTER — OUTPATIENT (OUTPATIENT)
Dept: OUTPATIENT SERVICES | Facility: HOSPITAL | Age: 72
LOS: 1 days | End: 2022-06-16
Payer: MEDICARE

## 2022-06-16 ENCOUNTER — APPOINTMENT (OUTPATIENT)
Dept: MRI IMAGING | Facility: IMAGING CENTER | Age: 72
End: 2022-06-16

## 2022-06-16 DIAGNOSIS — Z00.8 ENCOUNTER FOR OTHER GENERAL EXAMINATION: ICD-10-CM

## 2022-06-16 DIAGNOSIS — Z90.89 ACQUIRED ABSENCE OF OTHER ORGANS: Chronic | ICD-10-CM

## 2022-06-16 PROCEDURE — A9585: CPT

## 2022-06-16 PROCEDURE — C8908: CPT | Mod: MH

## 2022-06-16 PROCEDURE — 77049 MRI BREAST C-+ W/CAD BI: CPT | Mod: 26,MH

## 2022-06-16 PROCEDURE — C8937: CPT

## 2022-06-20 ENCOUNTER — OUTPATIENT (OUTPATIENT)
Dept: OUTPATIENT SERVICES | Facility: HOSPITAL | Age: 72
LOS: 1 days | End: 2022-06-20

## 2022-06-20 ENCOUNTER — APPOINTMENT (OUTPATIENT)
Dept: ULTRASOUND IMAGING | Facility: IMAGING CENTER | Age: 72
End: 2022-06-20
Payer: MEDICARE

## 2022-06-20 ENCOUNTER — OUTPATIENT (OUTPATIENT)
Dept: OUTPATIENT SERVICES | Facility: HOSPITAL | Age: 72
LOS: 1 days | End: 2022-06-20
Payer: MEDICARE

## 2022-06-20 VITALS
HEART RATE: 74 BPM | RESPIRATION RATE: 16 BRPM | TEMPERATURE: 97 F | DIASTOLIC BLOOD PRESSURE: 80 MMHG | WEIGHT: 160.06 LBS | HEIGHT: 59 IN | OXYGEN SATURATION: 99 % | SYSTOLIC BLOOD PRESSURE: 120 MMHG

## 2022-06-20 DIAGNOSIS — Z96.641 PRESENCE OF RIGHT ARTIFICIAL HIP JOINT: Chronic | ICD-10-CM

## 2022-06-20 DIAGNOSIS — Z00.8 ENCOUNTER FOR OTHER GENERAL EXAMINATION: ICD-10-CM

## 2022-06-20 DIAGNOSIS — Z90.89 ACQUIRED ABSENCE OF OTHER ORGANS: Chronic | ICD-10-CM

## 2022-06-20 DIAGNOSIS — C50.311 MALIGNANT NEOPLASM OF LOWER-INNER QUADRANT OF RIGHT FEMALE BREAST: ICD-10-CM

## 2022-06-20 DIAGNOSIS — Z86.79 PERSONAL HISTORY OF OTHER DISEASES OF THE CIRCULATORY SYSTEM: ICD-10-CM

## 2022-06-20 LAB
ALBUMIN SERPL ELPH-MCNC: 4.7 G/DL — SIGNIFICANT CHANGE UP (ref 3.3–5)
ALP SERPL-CCNC: 81 U/L — SIGNIFICANT CHANGE UP (ref 40–120)
ALT FLD-CCNC: 13 U/L — SIGNIFICANT CHANGE UP (ref 4–33)
ANION GAP SERPL CALC-SCNC: 12 MMOL/L — SIGNIFICANT CHANGE UP (ref 7–14)
AST SERPL-CCNC: 17 U/L — SIGNIFICANT CHANGE UP (ref 4–32)
BILIRUB SERPL-MCNC: 0.2 MG/DL — SIGNIFICANT CHANGE UP (ref 0.2–1.2)
BUN SERPL-MCNC: 15 MG/DL — SIGNIFICANT CHANGE UP (ref 7–23)
CALCIUM SERPL-MCNC: 9.8 MG/DL — SIGNIFICANT CHANGE UP (ref 8.4–10.5)
CHLORIDE SERPL-SCNC: 104 MMOL/L — SIGNIFICANT CHANGE UP (ref 98–107)
CO2 SERPL-SCNC: 24 MMOL/L — SIGNIFICANT CHANGE UP (ref 22–31)
CREAT SERPL-MCNC: 0.77 MG/DL — SIGNIFICANT CHANGE UP (ref 0.5–1.3)
EGFR: 81 ML/MIN/1.73M2 — SIGNIFICANT CHANGE UP
GLUCOSE SERPL-MCNC: 102 MG/DL — HIGH (ref 70–99)
HCT VFR BLD CALC: 44.4 % — SIGNIFICANT CHANGE UP (ref 34.5–45)
HGB BLD-MCNC: 14 G/DL — SIGNIFICANT CHANGE UP (ref 11.5–15.5)
MCHC RBC-ENTMCNC: 30.8 PG — SIGNIFICANT CHANGE UP (ref 27–34)
MCHC RBC-ENTMCNC: 31.5 GM/DL — LOW (ref 32–36)
MCV RBC AUTO: 97.8 FL — SIGNIFICANT CHANGE UP (ref 80–100)
NRBC # BLD: 0 /100 WBCS — SIGNIFICANT CHANGE UP
NRBC # FLD: 0 K/UL — SIGNIFICANT CHANGE UP
PLATELET # BLD AUTO: 236 K/UL — SIGNIFICANT CHANGE UP (ref 150–400)
POTASSIUM SERPL-MCNC: 4.2 MMOL/L — SIGNIFICANT CHANGE UP (ref 3.5–5.3)
POTASSIUM SERPL-SCNC: 4.2 MMOL/L — SIGNIFICANT CHANGE UP (ref 3.5–5.3)
PROT SERPL-MCNC: 7.3 G/DL — SIGNIFICANT CHANGE UP (ref 6–8.3)
RBC # BLD: 4.54 M/UL — SIGNIFICANT CHANGE UP (ref 3.8–5.2)
RBC # FLD: 12.9 % — SIGNIFICANT CHANGE UP (ref 10.3–14.5)
SODIUM SERPL-SCNC: 140 MMOL/L — SIGNIFICANT CHANGE UP (ref 135–145)
WBC # BLD: 5.64 K/UL — SIGNIFICANT CHANGE UP (ref 3.8–10.5)
WBC # FLD AUTO: 5.64 K/UL — SIGNIFICANT CHANGE UP (ref 3.8–10.5)

## 2022-06-20 PROCEDURE — 93010 ELECTROCARDIOGRAM REPORT: CPT

## 2022-06-20 PROCEDURE — 19285 PERQ DEV BREAST 1ST US IMAG: CPT | Mod: RT

## 2022-06-20 PROCEDURE — C1739: CPT

## 2022-06-20 PROCEDURE — 19285 PERQ DEV BREAST 1ST US IMAG: CPT

## 2022-06-20 RX ORDER — SODIUM CHLORIDE 9 MG/ML
1000 INJECTION, SOLUTION INTRAVENOUS
Refills: 0 | Status: DISCONTINUED | OUTPATIENT
Start: 2022-06-28 | End: 2022-07-12

## 2022-06-20 RX ORDER — ERGOCALCIFEROL 1.25 MG/1
1 CAPSULE ORAL
Qty: 0 | Refills: 0 | DISCHARGE

## 2022-06-20 RX ORDER — METOPROLOL TARTRATE 50 MG
1 TABLET ORAL
Qty: 0 | Refills: 0 | DISCHARGE

## 2022-06-20 NOTE — H&P PST ADULT - PROBLEM SELECTOR PLAN 1
Patient tentatively scheduled for right partial mastectomy with seed localization sentinel lymph node biopsy on 6/28/22.  Pre-op instructions provided. Pt given verbal and written instructions with teach back on chlorhexidine wash and pepcid. Pt verbalized understanding with return demonstration.   Covid testing scheduled prior to surgery as per patient.

## 2022-06-20 NOTE — H&P PST ADULT - NSICDXPASTMEDICALHX_GEN_ALL_CORE_FT
PAST MEDICAL HISTORY:  H/O ventricular tachycardia during stress test 15 years ago - no recurrence    Hyperlipidemia     Osteoarthritis

## 2022-06-20 NOTE — H&P PST ADULT - PROBLEM SELECTOR PLAN 2
Patient instructed to continue all medications as prescribed. h/o of VT during stress test 15 years ago - no recurrence. Patient instructed to continue all medications as prescribed.

## 2022-06-20 NOTE — H&P PST ADULT - HISTORY OF PRESENT ILLNESS
72 year old female with PMH of HLD, h/o Ventricular tachycardia (during stress test)  presents to Presurgical testing with diagnosis of malignant neoplasm lower- inner Quad right female breast scheduled for right partial mastectomy with seed localization sentinel lymph node biopsy.

## 2022-06-23 PROBLEM — Z86.79 PERSONAL HISTORY OF OTHER DISEASES OF THE CIRCULATORY SYSTEM: Chronic | Status: ACTIVE | Noted: 2022-06-20

## 2022-06-23 LAB — SARS-COV-2 RNA SPEC QL NAA+PROBE: SIGNIFICANT CHANGE UP

## 2022-06-27 NOTE — PHYSICAL THERAPY INITIAL EVALUATION ADULT - DID THE PATIENT HAVE SURGERY?
n/a Quinolones Counseling:  I discussed with the patient the risks of fluoroquinolones including but not limited to GI upset, allergic reaction, drug rash, diarrhea, dizziness, photosensitivity, yeast infections, liver function test abnormalities, tendonitis/tendon rupture.

## 2022-06-28 ENCOUNTER — OUTPATIENT (OUTPATIENT)
Dept: OUTPATIENT SERVICES | Facility: HOSPITAL | Age: 72
LOS: 1 days | Discharge: HOME | End: 2022-06-28
Payer: MEDICARE

## 2022-06-28 ENCOUNTER — TRANSCRIPTION ENCOUNTER (OUTPATIENT)
Age: 72
End: 2022-06-28

## 2022-06-28 ENCOUNTER — APPOINTMENT (OUTPATIENT)
Dept: SURGERY | Facility: HOSPITAL | Age: 72
End: 2022-06-28

## 2022-06-28 ENCOUNTER — APPOINTMENT (OUTPATIENT)
Dept: NUCLEAR MEDICINE | Facility: HOSPITAL | Age: 72
End: 2022-06-28

## 2022-06-28 ENCOUNTER — RESULT REVIEW (OUTPATIENT)
Age: 72
End: 2022-06-28

## 2022-06-28 VITALS
DIASTOLIC BLOOD PRESSURE: 64 MMHG | TEMPERATURE: 98 F | RESPIRATION RATE: 16 BRPM | HEIGHT: 59 IN | WEIGHT: 160.06 LBS | SYSTOLIC BLOOD PRESSURE: 148 MMHG | OXYGEN SATURATION: 97 % | HEART RATE: 79 BPM

## 2022-06-28 VITALS
RESPIRATION RATE: 17 BRPM | HEART RATE: 69 BPM | DIASTOLIC BLOOD PRESSURE: 58 MMHG | SYSTOLIC BLOOD PRESSURE: 146 MMHG | OXYGEN SATURATION: 100 %

## 2022-06-28 DIAGNOSIS — Z96.641 PRESENCE OF RIGHT ARTIFICIAL HIP JOINT: Chronic | ICD-10-CM

## 2022-06-28 DIAGNOSIS — Z90.89 ACQUIRED ABSENCE OF OTHER ORGANS: Chronic | ICD-10-CM

## 2022-06-28 DIAGNOSIS — C50.311 MALIGNANT NEOPLASM OF LOWER-INNER QUADRANT OF RIGHT FEMALE BREAST: ICD-10-CM

## 2022-06-28 PROCEDURE — 76098 X-RAY EXAM SURGICAL SPECIMEN: CPT | Mod: 26

## 2022-06-28 PROCEDURE — 38525K: CUSTOM | Mod: RT

## 2022-06-28 PROCEDURE — 38792K: CUSTOM | Mod: RT

## 2022-06-28 PROCEDURE — 19301K: CUSTOM | Mod: RT

## 2022-06-28 PROCEDURE — 88307 TISSUE EXAM BY PATHOLOGIST: CPT | Mod: 26

## 2022-06-28 PROCEDURE — 88305 TISSUE EXAM BY PATHOLOGIST: CPT | Mod: 26

## 2022-06-28 NOTE — BRIEF OPERATIVE NOTE - OPERATION/FINDINGS
Right breast partial mastectomy.     Right axillary sentinel lymph node biopsy.     SMIL Margins    Intra operative radiological confirmation of clip and seed within specimen.

## 2022-06-28 NOTE — ASU DISCHARGE PLAN (ADULT/PEDIATRIC) - NURSING INSTRUCTIONS
YOU HAVE RECEIVED TYLENOL DURING YOUR HOSPITAL STAY. THE MAX AMOUNT OF TYLENOL DAILY IS 4000MG. PLEASE KEEP THAT IN MIND IF YOU ARE TAKING OTHER PRESCRIPTION OR OVER THE COUNTER PAIN MEDICATIONS OR COLD MEDICINE. THE NEXT TIME THAT YOU CAN TAKE TYLENOL IS AT 6:30pm. YOU CAN THEN START TAKING IT EVERY 6 HOURS AS NEEDED FOR PAIN

## 2022-06-28 NOTE — ASU DISCHARGE PLAN (ADULT/PEDIATRIC) - ASU DC SPECIAL INSTRUCTIONSFT
PAIN CONTROL: Please take tylenol for pain control.     WOUND CARE:  Please keep incisions clean and dry. Please do not Scrub or rub incisions. Do not use lotion or powder on incisions. You have steri strip over your incision. Please do not remove these, they will fall off on their own.    BATHING: You may shower and/or sponge bathe starting tomorrow. Please do not submerge wound underwater. You may use warm soapy water in the shower and rinse, pat dry.    ACTIVITY: No heavy lifting or straining. Otherwise, you may return to your usual level of physical activity. If you are taking narcotic pain medication DO NOT drive a car, operate machinery or make important decisions.    DIET: Return to your usual diet.    NOTIFY YOUR SURGEON IF YOU HAVE: any bleeding that does not stop, any pus draining from your wound(s), any fever (over 100.4 F) persistent nausea/vomiting, or if your pain is not controlled on your discharge pain medications, unable to urinate.    Please follow up with your primary care physician in one week regarding your hospitalization, bring copies of your discharge paperwork.    Please follow-up with your surgeon, within 1-2 weeks following discharge- please call to schedule an appointment.

## 2022-06-28 NOTE — ASU DISCHARGE PLAN (ADULT/PEDIATRIC) - PROCEDURE
Right Partial Mastectomy, Right Axillary [place] Lymph Node Biopsy Right Partial Mastectomy, Right Axillary [place] Lymph Node Biopsy

## 2022-06-28 NOTE — ASU DISCHARGE PLAN (ADULT/PEDIATRIC) - CARE PROVIDER_API CALL
Cheryl Hua)  [facility] Breast [facility]  [address] [facility], [address]  [place], [place] [id number]  Phone: [phone]  Fax: [phone]  Follow Up Time: 2 weeks

## 2022-06-30 LAB — SURGICAL PATHOLOGY STUDY: SIGNIFICANT CHANGE UP

## 2022-07-06 ENCOUNTER — APPOINTMENT (OUTPATIENT)
Dept: SURGERY | Facility: CLINIC | Age: 72
End: 2022-07-06

## 2022-07-06 PROCEDURE — 99024 POSTOP FOLLOW-UP VISIT: CPT

## 2022-07-11 ENCOUNTER — OUTPATIENT (OUTPATIENT)
Dept: OUTPATIENT SERVICES | Facility: HOSPITAL | Age: 72
LOS: 1 days | Discharge: HOME | End: 2022-07-11

## 2022-07-11 DIAGNOSIS — Z96.641 PRESENCE OF RIGHT ARTIFICIAL HIP JOINT: Chronic | ICD-10-CM

## 2022-07-11 DIAGNOSIS — Z90.89 ACQUIRED ABSENCE OF OTHER ORGANS: Chronic | ICD-10-CM

## 2022-07-11 DIAGNOSIS — C50.919 MALIGNANT NEOPLASM OF UNSPECIFIED SITE OF UNSPECIFIED FEMALE BREAST: ICD-10-CM

## 2022-07-15 ENCOUNTER — APPOINTMENT (OUTPATIENT)
Dept: HEMATOLOGY ONCOLOGY | Facility: CLINIC | Age: 72
End: 2022-07-15

## 2022-07-15 ENCOUNTER — RESULT REVIEW (OUTPATIENT)
Age: 72
End: 2022-07-15

## 2022-07-15 ENCOUNTER — NON-APPOINTMENT (OUTPATIENT)
Age: 72
End: 2022-07-15

## 2022-07-15 VITALS
DIASTOLIC BLOOD PRESSURE: 78 MMHG | OXYGEN SATURATION: 98 % | TEMPERATURE: 96.7 F | SYSTOLIC BLOOD PRESSURE: 148 MMHG | RESPIRATION RATE: 16 BRPM | WEIGHT: 158.51 LBS | BODY MASS INDEX: 31.12 KG/M2 | HEART RATE: 87 BPM | HEIGHT: 59.72 IN

## 2022-07-15 DIAGNOSIS — Z78.9 OTHER SPECIFIED HEALTH STATUS: ICD-10-CM

## 2022-07-15 DIAGNOSIS — Z80.7 FAMILY HISTORY OF OTHER MALIGNANT NEOPLASMS OF LYMPHOID, HEMATOPOIETIC AND RELATED TISSUES: ICD-10-CM

## 2022-07-15 DIAGNOSIS — Z60.2 PROBLEMS RELATED TO LIVING ALONE: ICD-10-CM

## 2022-07-15 LAB
BASOPHILS # BLD AUTO: 0.03 K/UL — SIGNIFICANT CHANGE UP (ref 0–0.2)
BASOPHILS NFR BLD AUTO: 0.5 % — SIGNIFICANT CHANGE UP (ref 0–2)
EOSINOPHIL # BLD AUTO: 0.09 K/UL — SIGNIFICANT CHANGE UP (ref 0–0.5)
EOSINOPHIL NFR BLD AUTO: 1.5 % — SIGNIFICANT CHANGE UP (ref 0–6)
HCT VFR BLD CALC: 43.5 % — SIGNIFICANT CHANGE UP (ref 34.5–45)
HGB BLD-MCNC: 14.3 G/DL — SIGNIFICANT CHANGE UP (ref 11.5–15.5)
IMM GRANULOCYTES NFR BLD AUTO: 0.3 % — SIGNIFICANT CHANGE UP (ref 0–1.5)
LYMPHOCYTES # BLD AUTO: 2.34 K/UL — SIGNIFICANT CHANGE UP (ref 1–3.3)
LYMPHOCYTES # BLD AUTO: 38.2 % — SIGNIFICANT CHANGE UP (ref 13–44)
MCHC RBC-ENTMCNC: 31.4 PG — SIGNIFICANT CHANGE UP (ref 27–34)
MCHC RBC-ENTMCNC: 32.9 G/DL — SIGNIFICANT CHANGE UP (ref 32–36)
MCV RBC AUTO: 95.6 FL — SIGNIFICANT CHANGE UP (ref 80–100)
MONOCYTES # BLD AUTO: 0.48 K/UL — SIGNIFICANT CHANGE UP (ref 0–0.9)
MONOCYTES NFR BLD AUTO: 7.8 % — SIGNIFICANT CHANGE UP (ref 2–14)
NEUTROPHILS # BLD AUTO: 3.16 K/UL — SIGNIFICANT CHANGE UP (ref 1.8–7.4)
NEUTROPHILS NFR BLD AUTO: 51.7 % — SIGNIFICANT CHANGE UP (ref 43–77)
NRBC # BLD: 0 /100 WBCS — SIGNIFICANT CHANGE UP (ref 0–0)
PLATELET # BLD AUTO: 254 K/UL — SIGNIFICANT CHANGE UP (ref 150–400)
RBC # BLD: 4.55 M/UL — SIGNIFICANT CHANGE UP (ref 3.8–5.2)
RBC # FLD: 13.3 % — SIGNIFICANT CHANGE UP (ref 10.3–14.5)
WBC # BLD: 6.12 K/UL — SIGNIFICANT CHANGE UP (ref 3.8–10.5)
WBC # FLD AUTO: 6.12 K/UL — SIGNIFICANT CHANGE UP (ref 3.8–10.5)

## 2022-07-15 PROCEDURE — 99205 OFFICE O/P NEW HI 60 MIN: CPT

## 2022-07-15 RX ORDER — ACETAMINOPHEN 500 MG/1
500 TABLET ORAL
Refills: 0 | Status: DISCONTINUED | COMMUNITY
End: 2022-07-15

## 2022-07-15 RX ORDER — CELECOXIB 200 MG/1
200 CAPSULE ORAL
Refills: 0 | Status: DISCONTINUED | COMMUNITY
End: 2022-07-15

## 2022-07-15 RX ORDER — PRAVASTATIN SODIUM 80 MG/1
TABLET ORAL
Refills: 0 | Status: DISCONTINUED | COMMUNITY
End: 2022-07-15

## 2022-07-15 RX ORDER — AMOXICILLIN 500 MG/1
500 CAPSULE ORAL
Qty: 12 | Refills: 2 | Status: DISCONTINUED | COMMUNITY
Start: 2022-04-14 | End: 2022-07-15

## 2022-07-15 SDOH — SOCIAL STABILITY - SOCIAL INSECURITY: PROBLEMS RELATED TO LIVING ALONE: Z60.2

## 2022-07-16 ENCOUNTER — TRANSCRIPTION ENCOUNTER (OUTPATIENT)
Age: 72
End: 2022-07-16

## 2022-07-19 LAB
25(OH)D3 SERPL-MCNC: 31 NG/ML
ALBUMIN SERPL ELPH-MCNC: 5.1 G/DL
ALP BLD-CCNC: 82 U/L
ALT SERPL-CCNC: 12 U/L
ANION GAP SERPL CALC-SCNC: 18 MMOL/L
APTT BLD: 30.8 SEC
AST SERPL-CCNC: 18 U/L
BILIRUB SERPL-MCNC: 0.3 MG/DL
BUN SERPL-MCNC: 17 MG/DL
CALCIUM SERPL-MCNC: 10 MG/DL
CHLORIDE SERPL-SCNC: 101 MMOL/L
CO2 SERPL-SCNC: 20 MMOL/L
CREAT SERPL-MCNC: 0.8 MG/DL
EGFR: 77 ML/MIN/1.73M2
GLUCOSE SERPL-MCNC: 99 MG/DL
HAV IGM SER QL: NONREACTIVE
HBV CORE IGM SER QL: NONREACTIVE
HBV SURFACE AG SER QL: NONREACTIVE
HCV AB SER QL: NONREACTIVE
HCV S/CO RATIO: 0.08 S/CO
INR PPP: 0.97 RATIO
POTASSIUM SERPL-SCNC: 4.1 MMOL/L
PROT SERPL-MCNC: 7.9 G/DL
PT BLD: 11.2 SEC
SODIUM SERPL-SCNC: 139 MMOL/L

## 2022-07-20 ENCOUNTER — APPOINTMENT (OUTPATIENT)
Dept: NUCLEAR MEDICINE | Facility: IMAGING CENTER | Age: 72
End: 2022-07-20

## 2022-07-20 ENCOUNTER — OUTPATIENT (OUTPATIENT)
Dept: OUTPATIENT SERVICES | Facility: HOSPITAL | Age: 72
LOS: 1 days | End: 2022-07-20
Payer: MEDICARE

## 2022-07-20 DIAGNOSIS — C50.919 MALIGNANT NEOPLASM OF UNSPECIFIED SITE OF UNSPECIFIED FEMALE BREAST: ICD-10-CM

## 2022-07-20 DIAGNOSIS — Z96.641 PRESENCE OF RIGHT ARTIFICIAL HIP JOINT: Chronic | ICD-10-CM

## 2022-07-20 DIAGNOSIS — Z90.89 ACQUIRED ABSENCE OF OTHER ORGANS: Chronic | ICD-10-CM

## 2022-07-20 PROCEDURE — 78815 PET IMAGE W/CT SKULL-THIGH: CPT | Mod: 26,PI,MH

## 2022-07-20 PROCEDURE — A9552: CPT

## 2022-07-20 PROCEDURE — 78815 PET IMAGE W/CT SKULL-THIGH: CPT

## 2022-07-21 NOTE — HISTORY OF PRESENT ILLNESS
[T: ___] : T[unfilled] [N: ___] : N[unfilled] [M: ___] : M[unfilled] [AJCC Stage: ____] : AJCC Stage: [unfilled] [de-identified] : Ms.Linda Salazar is a 72 year old female here for an evaluation of breast cancer. Her oncologic history is as follows:\par \par She underwent routine breast imaging on 4/27/22 (BIRADS 0) which showed a right breast mass calcifications for which additional imaging is rec. Additional right breast imaging \par on 5/27/22 ( BIRADS 5) showed a persistent mass with spiculation and calcifications in the posterior lower slightly outer right breast on mammo which likely corresponds to a newly sonographically seen highly suspicious irregular hypoechoic mass at 6:00 on sono for which US biopsy is rec. \par  \par She underwent  right breast 6:00, 8 cmfn lesion core biopsy on 6/9/2022 which showed Invasive poorly differentiated ductal carcinoma, (invasive mammary carcinoma,NST), Anderson score 8/9, measuring 1.3 cm, ER+ 95%, PgR+ 25%,HER-2 +. Ductal carcinoma in situ, solid pattern with high grade,atypia, comedo necrosis and microcalcifications present. No lymphovascular permeation seen.\par \par She underwent a breast MRI on 6/16/22 (BIRADS 6) which showed 3.0 cm biopsy proven malignancy at the 6:00 position of the right breast with adjacent small satellite nodules. There was no MRI evidence of multicentric or contralateral disease.\par \par She underwent right breast lumpectomy on 6/28/22 which revealed Invasive poorly differentiated ductal carcinoma, Anderson grad 3, measuring 22 mm,.Margin were negative, Lymphovascular invasion was not seen. High grade, cribriform, solid type DCIS, was noted.Two sentinel lymph node were removed and  one was positive for metastasis measuring 0.7 cm..\par \par PCP retired, wants a new one\par [facility],will establish cardio \par \par

## 2022-07-21 NOTE — REASON FOR VISIT
[Initial Consultation] : an initial consultation [Other: _____] : [unfilled] [FreeTextEntry2] : Invasive poorly differentiated ductal carcinoma of the right breast

## 2022-07-21 NOTE — ASSESSMENT
[FreeTextEntry1] : Ms. 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 She is s/p lumpectomy on 6/28/22.\par \par I sat down with the patient and her nephew. I discussed the role of adjuvant chemotherapy for curative intent and to decrease the risk of relapse. I reviewed HER-2/eladio positive breast cancers are very aggressive and has high risk of recurrence and metastasis. Herceptin based chemotherapy is associated with survival benefit. \par \par I recommended chemotherapy with Taxotere cytoxan and Herceptin every 3 weeks for 6 cycles followed by Herceptin for one year based on BCIRG 006 study data. We also discussed to add Pertuzumab based on the results from APHINITY Trial which showed improved outcomes with addition of a second HER2-targeted drug \par (pertuzumab) to standard-of-care trastuzumab. The benefit from pertuzumab appeared slightly greater among patients with node-positive disease. We discussed that patient is elderly and treatment plan will be altered based on her tolerance to the treatment. I would not add Adriamycin to trastuzumab based chemotherapy due to risk of cardiotoxicity and a small but real risk of leukemia with Adriamycin. She will receive Onpro for Count support. \par \par I discussed the expected and unexpected side effects of chemotherapy, including but not limited to hair loss, fatigue, change in taste, mouth sores, nausea, vomiting, diarrhea, infusion reaction, allergic reaction, significant myelosuppression, need for blood transfusion, infection, bleeding, cardiac toxicity, neuropathy, kidney injury, nerve pain, muscle pain and detrimental effect on liver and heart function. The patient understood all the potential side effects and signed an informed consent after discussing the risks, benefits and alternatives.\par \par We discussed COLD CAP to prevent hair loss with chemotherapy. We reviewed the logistics, good success rate, and tender haircare during and after treatment. We discussed the efficacy and safety data based on some of the recent trials but lack of long-term survival data. \par \par I discussed to perform echocardiogram every 3 months and Mediport placement before starting chemotherapy. She will be premedicated with Emend, Aloxi and dexamethasone. I'll obtain blood work including complete metabolic profile, acute hepatitis panel and coagulation profile. My office will schedule mediport placement. I would obtain a PET/CT for extent of disease workup. She has cardiac history and therefore will be referred to see Dr Baron. \par \par We discussed that she is also a candidate foe radiation and endocrine therapy. \par \par The patient had plenty of time to ask questions and all questions were answered to satisfaction. I gave my office phone number and encouraged to call with any questions or additional information. \par

## 2022-07-21 NOTE — CONSULT LETTER
[Dear  ___] : Dear  [unfilled], [Consult Letter:] : I had the pleasure of evaluating your patient, [unfilled]. [Please see my note below.] : Please see my note below. [Consult Closing:] : Thank you very much for allowing me to participate in the care of this patient.  If you have any questions, please do not hesitate to contact me. [Sincerely,] : Sincerely, [FreeTextEntry2] : Dr. Cheryl Hua [FreeTextEntry3] : Shahida Llanes MD\par Division of Medical Oncology and Hematology\par [facility]\par [employer] [facility]\par [place], [place]

## 2022-07-25 ENCOUNTER — APPOINTMENT (OUTPATIENT)
Dept: SURGERY | Facility: CLINIC | Age: 72
End: 2022-07-25

## 2022-07-25 PROCEDURE — 99024 POSTOP FOLLOW-UP VISIT: CPT

## 2022-07-26 ENCOUNTER — APPOINTMENT (OUTPATIENT)
Dept: CARDIOLOGY | Facility: CLINIC | Age: 72
End: 2022-07-26

## 2022-07-26 PROCEDURE — 93356 MYOCRD STRAIN IMG SPCKL TRCK: CPT

## 2022-07-26 PROCEDURE — 93306 TTE W/DOPPLER COMPLETE: CPT

## 2022-08-02 ENCOUNTER — NON-APPOINTMENT (OUTPATIENT)
Age: 72
End: 2022-08-02

## 2022-08-08 ENCOUNTER — NON-APPOINTMENT (OUTPATIENT)
Age: 72
End: 2022-08-08

## 2022-08-10 ENCOUNTER — NON-APPOINTMENT (OUTPATIENT)
Age: 72
End: 2022-08-10

## 2022-08-11 ENCOUNTER — OUTPATIENT (OUTPATIENT)
Dept: OUTPATIENT SERVICES | Facility: HOSPITAL | Age: 72
LOS: 1 days | End: 2022-08-11
Payer: MEDICARE

## 2022-08-11 ENCOUNTER — RESULT REVIEW (OUTPATIENT)
Age: 72
End: 2022-08-11

## 2022-08-11 ENCOUNTER — TRANSCRIPTION ENCOUNTER (OUTPATIENT)
Age: 72
End: 2022-08-11

## 2022-08-11 VITALS
SYSTOLIC BLOOD PRESSURE: 121 MMHG | HEIGHT: 59 IN | RESPIRATION RATE: 18 BRPM | HEART RATE: 68 BPM | DIASTOLIC BLOOD PRESSURE: 65 MMHG | WEIGHT: 156.97 LBS | TEMPERATURE: 97 F | OXYGEN SATURATION: 96 %

## 2022-08-11 VITALS — SYSTOLIC BLOOD PRESSURE: 150 MMHG | OXYGEN SATURATION: 98 % | DIASTOLIC BLOOD PRESSURE: 62 MMHG | HEART RATE: 70 BPM

## 2022-08-11 DIAGNOSIS — Z90.89 ACQUIRED ABSENCE OF OTHER ORGANS: Chronic | ICD-10-CM

## 2022-08-11 DIAGNOSIS — Z96.641 PRESENCE OF RIGHT ARTIFICIAL HIP JOINT: Chronic | ICD-10-CM

## 2022-08-11 DIAGNOSIS — C50.919 MALIGNANT NEOPLASM OF UNSPECIFIED SITE OF UNSPECIFIED FEMALE BREAST: ICD-10-CM

## 2022-08-11 DIAGNOSIS — Z98.890 OTHER SPECIFIED POSTPROCEDURAL STATES: Chronic | ICD-10-CM

## 2022-08-11 PROCEDURE — C1769: CPT

## 2022-08-11 PROCEDURE — C1894: CPT

## 2022-08-11 PROCEDURE — 36561 INSERT TUNNELED CV CATH: CPT

## 2022-08-11 PROCEDURE — 77001 FLUOROGUIDE FOR VEIN DEVICE: CPT

## 2022-08-11 PROCEDURE — C1788: CPT

## 2022-08-11 PROCEDURE — 76937 US GUIDE VASCULAR ACCESS: CPT

## 2022-08-11 PROCEDURE — 77001 FLUOROGUIDE FOR VEIN DEVICE: CPT | Mod: 26

## 2022-08-11 PROCEDURE — 76937 US GUIDE VASCULAR ACCESS: CPT | Mod: 26

## 2022-08-11 NOTE — ASU DISCHARGE PLAN (ADULT/PEDIATRIC) - NS MD DC FALL RISK RISK
For information on Fall & Injury Prevention, visit: [email or web address] OR  [email or web address] OR  https://www.cdc.gov/steadi/patient.html

## 2022-08-11 NOTE — PRE-OP CHECKLIST - WEIGHT IN LBS
156.7 Airway patent, Nasal mucosa clear. Mouth with normal mucosa. Throat has no vesicles, no oropharyngeal exudates and uvula is midline.

## 2022-08-11 NOTE — PRE PROCEDURE NOTE - PRE PROCEDURE EVALUATION
72F with right breast cancer, s/p right lumpectomy. Now planned for port placement for chemotherapy.

## 2022-08-11 NOTE — ASU PATIENT PROFILE, ADULT - NSICDXPASTMEDICALHX_GEN_ALL_CORE_FT
PAST MEDICAL HISTORY:  Breast cancer, right     H/O ventricular tachycardia during stress test 15 years ago - no recurrence    Hyperlipidemia     Osteoarthritis

## 2022-08-11 NOTE — ASU DISCHARGE PLAN (ADULT/PEDIATRIC) - NURSING INSTRUCTIONS
Please feel free to contact us at [phone] if any problems arise. After 6PM, Monday through Friday, on weekends and on holidays, please call [phone] and ask for the radiology resident on call to be paged.

## 2022-08-11 NOTE — ASU PATIENT PROFILE, ADULT - FALL HARM RISK - HARM RISK INTERVENTIONS

## 2022-08-11 NOTE — ASU PATIENT PROFILE, ADULT - NSICDXPASTSURGICALHX_GEN_ALL_CORE_FT
PAST SURGICAL HISTORY:  History of right hip replacement 3/31/22    S/P lumpectomy, right breast     S/P tonsillectomy

## 2022-08-12 ENCOUNTER — RESULT REVIEW (OUTPATIENT)
Age: 72
End: 2022-08-12

## 2022-08-12 ENCOUNTER — NON-APPOINTMENT (OUTPATIENT)
Age: 72
End: 2022-08-12

## 2022-08-12 ENCOUNTER — APPOINTMENT (OUTPATIENT)
Dept: HEMATOLOGY ONCOLOGY | Facility: CLINIC | Age: 72
End: 2022-08-12

## 2022-08-12 ENCOUNTER — APPOINTMENT (OUTPATIENT)
Dept: INFUSION THERAPY | Facility: HOSPITAL | Age: 72
End: 2022-08-12

## 2022-08-12 VITALS — DIASTOLIC BLOOD PRESSURE: 71 MMHG | SYSTOLIC BLOOD PRESSURE: 150 MMHG

## 2022-08-12 VITALS
HEIGHT: 60.39 IN | BODY MASS INDEX: 30.97 KG/M2 | DIASTOLIC BLOOD PRESSURE: 71 MMHG | OXYGEN SATURATION: 98 % | SYSTOLIC BLOOD PRESSURE: 154 MMHG | HEART RATE: 92 BPM | WEIGHT: 159.81 LBS | TEMPERATURE: 97.2 F | RESPIRATION RATE: 16 BRPM

## 2022-08-12 LAB
BASOPHILS # BLD AUTO: 0.01 K/UL — SIGNIFICANT CHANGE UP (ref 0–0.2)
BASOPHILS NFR BLD AUTO: 0.1 % — SIGNIFICANT CHANGE UP (ref 0–2)
EOSINOPHIL # BLD AUTO: 0 K/UL — SIGNIFICANT CHANGE UP (ref 0–0.5)
EOSINOPHIL NFR BLD AUTO: 0 % — SIGNIFICANT CHANGE UP (ref 0–6)
HCT VFR BLD CALC: 42.1 % — SIGNIFICANT CHANGE UP (ref 34.5–45)
HGB BLD-MCNC: 14.2 G/DL — SIGNIFICANT CHANGE UP (ref 11.5–15.5)
IMM GRANULOCYTES NFR BLD AUTO: 0.6 % — SIGNIFICANT CHANGE UP (ref 0–1.5)
LYMPHOCYTES # BLD AUTO: 0.75 K/UL — LOW (ref 1–3.3)
LYMPHOCYTES # BLD AUTO: 8.7 % — LOW (ref 13–44)
MCHC RBC-ENTMCNC: 31.1 PG — SIGNIFICANT CHANGE UP (ref 27–34)
MCHC RBC-ENTMCNC: 33.7 G/DL — SIGNIFICANT CHANGE UP (ref 32–36)
MCV RBC AUTO: 92.1 FL — SIGNIFICANT CHANGE UP (ref 80–100)
MONOCYTES # BLD AUTO: 0.08 K/UL — SIGNIFICANT CHANGE UP (ref 0–0.9)
MONOCYTES NFR BLD AUTO: 0.9 % — LOW (ref 2–14)
NEUTROPHILS # BLD AUTO: 7.77 K/UL — HIGH (ref 1.8–7.4)
NEUTROPHILS NFR BLD AUTO: 89.7 % — HIGH (ref 43–77)
NRBC # BLD: 0 /100 WBCS — SIGNIFICANT CHANGE UP (ref 0–0)
PLATELET # BLD AUTO: 261 K/UL — SIGNIFICANT CHANGE UP (ref 150–400)
RBC # BLD: 4.57 M/UL — SIGNIFICANT CHANGE UP (ref 3.8–5.2)
RBC # FLD: 13.2 % — SIGNIFICANT CHANGE UP (ref 10.3–14.5)
WBC # BLD: 8.66 K/UL — SIGNIFICANT CHANGE UP (ref 3.8–10.5)
WBC # FLD AUTO: 8.66 K/UL — SIGNIFICANT CHANGE UP (ref 3.8–10.5)

## 2022-08-12 PROCEDURE — 99215 OFFICE O/P EST HI 40 MIN: CPT

## 2022-08-15 DIAGNOSIS — Z51.11 ENCOUNTER FOR ANTINEOPLASTIC CHEMOTHERAPY: ICD-10-CM

## 2022-08-15 DIAGNOSIS — Z51.89 ENCOUNTER FOR OTHER SPECIFIED AFTERCARE: ICD-10-CM

## 2022-08-15 DIAGNOSIS — R11.2 NAUSEA WITH VOMITING, UNSPECIFIED: ICD-10-CM

## 2022-08-17 ENCOUNTER — NON-APPOINTMENT (OUTPATIENT)
Age: 72
End: 2022-08-17

## 2022-08-19 ENCOUNTER — RESULT REVIEW (OUTPATIENT)
Age: 72
End: 2022-08-19

## 2022-08-19 ENCOUNTER — APPOINTMENT (OUTPATIENT)
Dept: HEMATOLOGY ONCOLOGY | Facility: CLINIC | Age: 72
End: 2022-08-19

## 2022-08-19 ENCOUNTER — APPOINTMENT (OUTPATIENT)
Dept: INFUSION THERAPY | Facility: HOSPITAL | Age: 72
End: 2022-08-19

## 2022-08-19 VITALS
SYSTOLIC BLOOD PRESSURE: 112 MMHG | OXYGEN SATURATION: 97 % | TEMPERATURE: 97.5 F | HEIGHT: 60.39 IN | DIASTOLIC BLOOD PRESSURE: 75 MMHG | BODY MASS INDEX: 29.13 KG/M2 | WEIGHT: 150.33 LBS | HEART RATE: 120 BPM | RESPIRATION RATE: 16 BRPM

## 2022-08-19 LAB
ALBUMIN SERPL ELPH-MCNC: 4.4 G/DL — SIGNIFICANT CHANGE UP (ref 3.3–5)
ALP SERPL-CCNC: 95 U/L — SIGNIFICANT CHANGE UP (ref 40–120)
ALT FLD-CCNC: 18 U/L — SIGNIFICANT CHANGE UP (ref 10–45)
ANION GAP SERPL CALC-SCNC: 19 MMOL/L — HIGH (ref 5–17)
AST SERPL-CCNC: 20 U/L — SIGNIFICANT CHANGE UP (ref 10–40)
BASOPHILS # BLD AUTO: 0 K/UL — SIGNIFICANT CHANGE UP (ref 0–0.2)
BASOPHILS NFR BLD AUTO: 0 % — SIGNIFICANT CHANGE UP (ref 0–2)
BILIRUB SERPL-MCNC: 0.3 MG/DL — SIGNIFICANT CHANGE UP (ref 0.2–1.2)
BLASTS # FLD: 1 % — HIGH (ref 0–0)
BUN SERPL-MCNC: 14 MG/DL — SIGNIFICANT CHANGE UP (ref 7–23)
CALCIUM SERPL-MCNC: 9.5 MG/DL — SIGNIFICANT CHANGE UP (ref 8.4–10.5)
CHLORIDE SERPL-SCNC: 98 MMOL/L — SIGNIFICANT CHANGE UP (ref 96–108)
CO2 SERPL-SCNC: 19 MMOL/L — LOW (ref 22–31)
CREAT SERPL-MCNC: 0.99 MG/DL — SIGNIFICANT CHANGE UP (ref 0.5–1.3)
DOHLE BOD BLD QL SMEAR: PRESENT — SIGNIFICANT CHANGE UP
EGFR: 60 ML/MIN/1.73M2 — SIGNIFICANT CHANGE UP
EOSINOPHIL # BLD AUTO: 0 K/UL — SIGNIFICANT CHANGE UP (ref 0–0.5)
EOSINOPHIL NFR BLD AUTO: 0 % — SIGNIFICANT CHANGE UP (ref 0–6)
GLUCOSE SERPL-MCNC: 96 MG/DL — SIGNIFICANT CHANGE UP (ref 70–99)
HCT VFR BLD CALC: 43.8 % — SIGNIFICANT CHANGE UP (ref 34.5–45)
HGB BLD-MCNC: 14.7 G/DL — SIGNIFICANT CHANGE UP (ref 11.5–15.5)
LYMPHOCYTES # BLD AUTO: 27 % — SIGNIFICANT CHANGE UP (ref 13–44)
LYMPHOCYTES # BLD AUTO: 3.2 K/UL — SIGNIFICANT CHANGE UP (ref 1–3.3)
MAGNESIUM SERPL-MCNC: 2 MG/DL — SIGNIFICANT CHANGE UP (ref 1.6–2.6)
MCHC RBC-ENTMCNC: 31.1 PG — SIGNIFICANT CHANGE UP (ref 27–34)
MCHC RBC-ENTMCNC: 33.6 G/DL — SIGNIFICANT CHANGE UP (ref 32–36)
MCV RBC AUTO: 92.6 FL — SIGNIFICANT CHANGE UP (ref 80–100)
METAMYELOCYTES # FLD: 2 % — HIGH (ref 0–0)
MONOCYTES # BLD AUTO: 1.89 K/UL — HIGH (ref 0–0.9)
MONOCYTES NFR BLD AUTO: 16 % — HIGH (ref 2–14)
MYELOCYTES NFR BLD: 4 % — HIGH (ref 0–0)
NEUTROPHILS # BLD AUTO: 5.21 K/UL — SIGNIFICANT CHANGE UP (ref 1.8–7.4)
NEUTROPHILS NFR BLD AUTO: 44 % — SIGNIFICANT CHANGE UP (ref 43–77)
NRBC # BLD: 0 /100 — SIGNIFICANT CHANGE UP (ref 0–0)
NRBC # BLD: SIGNIFICANT CHANGE UP /100 WBCS (ref 0–0)
PLAT MORPH BLD: NORMAL — SIGNIFICANT CHANGE UP
PLATELET # BLD AUTO: 150 K/UL — SIGNIFICANT CHANGE UP (ref 150–400)
POTASSIUM SERPL-MCNC: 4.2 MMOL/L — SIGNIFICANT CHANGE UP (ref 3.5–5.3)
POTASSIUM SERPL-SCNC: 4.2 MMOL/L — SIGNIFICANT CHANGE UP (ref 3.5–5.3)
PROT SERPL-MCNC: 6.8 G/DL — SIGNIFICANT CHANGE UP (ref 6–8.3)
RBC # BLD: 4.73 M/UL — SIGNIFICANT CHANGE UP (ref 3.8–5.2)
RBC # FLD: 13.1 % — SIGNIFICANT CHANGE UP (ref 10.3–14.5)
RBC BLD AUTO: SIGNIFICANT CHANGE UP
SODIUM SERPL-SCNC: 137 MMOL/L — SIGNIFICANT CHANGE UP (ref 135–145)
TOXIC GRANULES BLD QL SMEAR: PRESENT — SIGNIFICANT CHANGE UP
VARIANT LYMPHS # BLD: 6 % — SIGNIFICANT CHANGE UP (ref 0–6)
WBC # BLD: 11.84 K/UL — HIGH (ref 3.8–10.5)
WBC # FLD AUTO: 11.84 K/UL — HIGH (ref 3.8–10.5)

## 2022-08-19 PROCEDURE — 99215 OFFICE O/P EST HI 40 MIN: CPT

## 2022-08-22 ENCOUNTER — RESULT REVIEW (OUTPATIENT)
Age: 72
End: 2022-08-22

## 2022-08-22 ENCOUNTER — APPOINTMENT (OUTPATIENT)
Dept: CARDIOLOGY | Facility: CLINIC | Age: 72
End: 2022-08-22

## 2022-08-22 ENCOUNTER — LABORATORY RESULT (OUTPATIENT)
Age: 72
End: 2022-08-22

## 2022-08-22 VITALS
BODY MASS INDEX: 30.26 KG/M2 | SYSTOLIC BLOOD PRESSURE: 118 MMHG | RESPIRATION RATE: 16 BRPM | TEMPERATURE: 97.2 F | DIASTOLIC BLOOD PRESSURE: 74 MMHG | OXYGEN SATURATION: 97 % | WEIGHT: 156.97 LBS | HEART RATE: 88 BPM

## 2022-08-22 DIAGNOSIS — E86.0 DEHYDRATION: ICD-10-CM

## 2022-08-22 LAB
BASOPHILS # BLD AUTO: 0 K/UL — SIGNIFICANT CHANGE UP (ref 0–0.2)
BASOPHILS NFR BLD AUTO: 0 % — SIGNIFICANT CHANGE UP (ref 0–2)
DOHLE BOD BLD QL SMEAR: PRESENT — SIGNIFICANT CHANGE UP
EOSINOPHIL # BLD AUTO: 0 K/UL — SIGNIFICANT CHANGE UP (ref 0–0.5)
EOSINOPHIL NFR BLD AUTO: 0 % — SIGNIFICANT CHANGE UP (ref 0–6)
HCT VFR BLD CALC: 39.1 % — SIGNIFICANT CHANGE UP (ref 34.5–45)
HGB BLD-MCNC: 12.7 G/DL — SIGNIFICANT CHANGE UP (ref 11.5–15.5)
LYMPHOCYTES # BLD AUTO: 15 % — SIGNIFICANT CHANGE UP (ref 13–44)
LYMPHOCYTES # BLD AUTO: 4.28 K/UL — HIGH (ref 1–3.3)
MCHC RBC-ENTMCNC: 30.9 PG — SIGNIFICANT CHANGE UP (ref 27–34)
MCHC RBC-ENTMCNC: 32.5 G/DL — SIGNIFICANT CHANGE UP (ref 32–36)
MCV RBC AUTO: 95.1 FL — SIGNIFICANT CHANGE UP (ref 80–100)
MONOCYTES # BLD AUTO: 1.71 K/UL — HIGH (ref 0–0.9)
MONOCYTES NFR BLD AUTO: 6 % — SIGNIFICANT CHANGE UP (ref 2–14)
NEUTROPHILS # BLD AUTO: 22.55 K/UL — HIGH (ref 1.8–7.4)
NEUTROPHILS NFR BLD AUTO: 78 % — HIGH (ref 43–77)
NEUTS BAND # BLD: 1 % — SIGNIFICANT CHANGE UP (ref 0–8)
NRBC # BLD: 0 /100 — SIGNIFICANT CHANGE UP (ref 0–0)
NRBC # BLD: SIGNIFICANT CHANGE UP /100 WBCS (ref 0–0)
PLAT MORPH BLD: NORMAL — SIGNIFICANT CHANGE UP
PLATELET # BLD AUTO: 158 K/UL — SIGNIFICANT CHANGE UP (ref 150–400)
RBC # BLD: 4.11 M/UL — SIGNIFICANT CHANGE UP (ref 3.8–5.2)
RBC # FLD: 13.9 % — SIGNIFICANT CHANGE UP (ref 10.3–14.5)
RBC BLD AUTO: SIGNIFICANT CHANGE UP
TOXIC GRANULES BLD QL SMEAR: PRESENT — SIGNIFICANT CHANGE UP
WBC # BLD: 28.55 K/UL — HIGH (ref 3.8–10.5)
WBC # FLD AUTO: 28.55 K/UL — HIGH (ref 3.8–10.5)

## 2022-08-22 PROCEDURE — 93000 ELECTROCARDIOGRAM COMPLETE: CPT

## 2022-08-22 PROCEDURE — 93010 ELECTROCARDIOGRAM REPORT: CPT | Mod: 59

## 2022-08-22 PROCEDURE — 99205 OFFICE O/P NEW HI 60 MIN: CPT

## 2022-08-22 NOTE — HISTORY OF PRESENT ILLNESS
[FreeTextEntry1] : ELLA PIÑA is a 72 year woman with a history of hypercholesterolemia recently diagnosed with HER2+ right breast cancer in April 2022. Over a decade prior she had a ventricular arrhythmia during an exercise treadmill stress test. She was referred for cardiac catheterization which showed no CAD. She was recommended to take metoprolol and remains on 25 XL daily since. She denies any symptoms. Does not recall why the stress test was ordered. She denies any palpitations, syncope, near syncope, or exertional dyspnea.\par \[facility] Takes pravastatin for "very high" lipids, which her mother also had. Reports intolerance to other statins previously and mild carotid plaque noted on prior duplex sonogram, all of this over a decade or more ago with her primary care doctor who retired and has since passed on.\par \[facility] Lives in [place].\par \par Right hip replaced March 31, 2022\par Retired , \par \par \par Cardiovascular Summary:\par ----------------------------------------------\par ECG:\par 8/22/2022: NSR and normal ECG\par ----------------------------------------------\par Echo:\par 7/26/2022: normal LVEF 58 % and GLS -18.5\par ----------------------------------------------

## 2022-08-22 NOTE — REASON FOR VISIT
[FreeTextEntry3] : Dr. Llanes [FreeTextEntry1] : ELLA PIÑA is a 72 year woman with a history of hypercholesterolemia, arrhythmia, and HER2+ right breast cancer who is referred for cardio-oncology evaluation.\par \par Prior Cancer Treatments:\par ------------------------------------------------------------------------\par Chemo/targeted therapy:\par TCHP 8/12/2022-\par ------------------------------------------------------------------------\par Surgery:\par Right lumpectomy 6/28/2022 \par ------------------------------------------------------------------------\par Radiation:

## 2022-08-22 NOTE — PHYSICAL EXAM
[Normal] : well developed, well nourished, no acute distress [Normal Conjunctiva] : normal conjunctiva [No Xanthelasma] : no xanthelasma [Normal Venous Pressure] : normal venous pressure [No Carotid Bruit] : no carotid bruit [Normal S1, S2] : normal S1, S2 [No Murmur] : no murmur [No Rub] : no rub [No Gallop] : no gallop [Clear Lung Fields] : clear lung fields [Good Air Entry] : good air entry [No Respiratory Distress] : no respiratory distress  [Soft] : abdomen soft [Normal Gait] : normal gait [Gait - Sufficient for Exercise Testing] : gait - sufficient for exercise testing [No Edema] : no edema [No Cyanosis] : no cyanosis [No Clubbing] : no clubbing [No Varicosities] : no varicosities [No Rash] : no rash [Moves all extremities] : moves all extremities [No Focal Deficits] : no focal deficits [Normal Speech] : normal speech [Alert and Oriented] : alert and oriented

## 2022-08-22 NOTE — DISCUSSION/SUMMARY
[FreeTextEntry1] : 72 year old woman with risk factors for HER2 cardiotoxicity, (age, HTN, ? arrhythmia) who is asymptomatic from the cardiovascular point of view. Baseline echo normal EF and strain and no evident structural heart disease. Her ECG today is normal.\par \par To check lipid panel and A1c to ensure these are optimized. If true statin intolerance and familial hypercholesterolemia, will refer to lipid clinic for consideration of PSK9. Unclear history of statin intolerance so will trial rosuvastatin pending results of lipid panel. \par \par Because of carotid plaque, will repeat screening carotid duplex now.\par \par Mild vacular calcifications reported on staging PET/CT.  - continue aspirin and statin for now.\par \par Will check pro-BNP and HsTnT for a baseline today. Then continue monitoring with serial echo/GLS every 3 months per protocol during adjuvant HP therapy. If any symptoms or abnormalities in the echo/GLS, can repeat biomarkers to guide intervention.\par \par We discussed the risk factors, current recommendations, and approach to cardiotoxicity monitoring during breast cancer therapy, including the complementary role of imaging by echo with global longitudinal strain, biomarkers, electrocardiogram, and clinical examination to inform shared decision making with the patient and the medical oncologist regarding the risks/benefits of treatment on neoplastic relapse risk and short and long term effects on the cardiovascular system. \par \par Above recommendations discussed with the patient and all questions were answered to the best of my ability and to her apparent satisfaction. \par \par Follow up in 3 months with me.\par \par  [EKG obtained to assist in diagnosis and management of assessed problem(s)] : EKG obtained to assist in diagnosis and management of assessed problem(s)

## 2022-08-23 PROBLEM — C50.911 MALIGNANT NEOPLASM OF UNSPECIFIED SITE OF RIGHT FEMALE BREAST: Chronic | Status: ACTIVE | Noted: 2022-08-09

## 2022-08-23 LAB
CHOLEST SERPL-MCNC: 185 MG/DL
ESTIMATED AVERAGE GLUCOSE: 128 MG/DL
HBA1C MFR BLD HPLC: 6.1 %
HDLC SERPL-MCNC: 30 MG/DL
LDLC SERPL CALC-MCNC: NORMAL MG/DL
NONHDLC SERPL-MCNC: 154 MG/DL
TRIGL SERPL-MCNC: 466 MG/DL
TROPONIN-T, HIGH SENSITIVITY: 6 NG/L

## 2022-08-24 DIAGNOSIS — C50.911 MALIGNANT NEOPLASM OF UNSPECIFIED SITE OF RIGHT FEMALE BREAST: ICD-10-CM

## 2022-08-24 DIAGNOSIS — Z45.2 ENCOUNTER FOR ADJUSTMENT AND MANAGEMENT OF VASCULAR ACCESS DEVICE: ICD-10-CM

## 2022-08-25 NOTE — HISTORY OF PRESENT ILLNESS
[T: ___] : T[unfilled] [N: ___] : N[unfilled] [M: ___] : M[unfilled] [AJCC Stage: ____] : AJCC Stage: [unfilled] [3 - Distress Level] : Distress Level: 3 [de-identified] : Ms.Linda Slaazar is a 72 year old female here for an evaluation of breast cancer. Her oncologic history is as follows:\par \par She underwent routine breast imaging on 4/27/22 (BIRADS 0) which showed a right breast mass calcifications for which additional imaging is rec. Additional right breast imaging \par on 5/27/22 ( BIRADS 5) showed a persistent mass with spiculation and calcifications in the posterior lower slightly outer right breast on mammo which likely corresponds to a newly sonographically seen highly suspicious irregular hypoechoic mass at 6:00 on sono for which US biopsy is rec. \par  \par She underwent  right breast 6:00, 8 cmfn lesion core biopsy on 6/9/2022 which showed Invasive poorly differentiated ductal carcinoma, (invasive mammary carcinoma,NST), Fanshawe score 8/9, measuring 1.3 cm, ER+ 95%, PgR+ 25%,HER-2 +. Ductal carcinoma in situ, solid pattern with high grade,atypia, comedo necrosis and microcalcifications present. No lymphovascular permeation seen.\par \par She underwent a breast MRI on 6/16/22 (BIRADS 6) which showed 3.0 cm biopsy proven malignancy at the 6:00 position of the right breast with adjacent small satellite nodules. There was no MRI evidence of multicentric or contralateral disease.\par \par She underwent right breast lumpectomy on 6/28/22 which revealed Invasive poorly differentiated ductal carcinoma, Fanshawe grad 3, measuring 22 mm,.Margin were negative, Lymphovascular invasion was not seen. High grade, cribriform, solid type DCIS, was noted.Two sentinel lymph node were removed and  one was positive for metastasis measuring 0.7 cm..\par \par PCP retired, wants a new one\par [facility],will establish cardio \par \par  [de-identified] : Ms. ELLA PIÑA is a 72 year old female here for f/u of  ER/ [place]/ HER-2/eladio positive right breast cancer dx 6/2022.  S/p lumpectomy, started adjuvant TCHP chemo 8/12/22.\par \par Here to start  TCHP+ onpro #1 today 8/19/22 Counts good\par Chemo expected and unexpected side effects discussed, including but not limited to hair loss, fatigue, change in taste, mouth sores, nausea, vomiting, diarrhea, infusion reaction, allergic reaction, significant myelosuppression, need for blood transfusion, infection, bleeding, cardiac toxicity, neuropathy, kidney injury, nerve pain, muscle pain and detrimental effect on liver and heart function. .\par Medication for symptom management reviewed and questions answered\par 7/26/22 ECHO LVEF 58.2%\par 07/20/2022 PET CT CAP:NICHOLAS\par EKG done , Chemo consent obtained \par 8/11/22 Left side Mediport in place, catheter tip at the distal SVC\par Patient is a anxious about stating chemo today\par Emotional support given\par

## 2022-08-25 NOTE — PHYSICAL EXAM
[Restricted in physically strenuous activity but ambulatory and able to carry out work of a light or sedentary nature] : Status 1- Restricted in physically strenuous activity but ambulatory and able to carry out work of a light or sedentary nature, e.g., light house work, office work [Normal] : clear to auscultation bilaterally, no dullness, no wheezing [de-identified] : Left sided med port in place CDI

## 2022-08-25 NOTE — ASSESSMENT
[FreeTextEntry1] : Ms. ELLA PIÑA is a 72 year old female with stage IB  ( pT2, N1a,Mx ) ER/ [place]/ HER-2/eladio positive Invasive poorly differentiated ductal carcinoma of the right breast. She is s/p lumpectomy on 6/28/22.\par \par \par Discussed chemotherapy with Taxotere cytoxan and Herceptin every 3 weeks for 6 cycles followed by Herceptin for one year based on BCIRG 006 study data. We also discussed to add Pertuzumab based on the results from APHINITY Trial which showed improved outcomes with addition of a second HER2-targeted drug \par (pertuzumab) to standard-of-care trastuzumab. The benefit from pertuzumab appeared slightly greater among patients with node-positive disease. We discussed that patient is elderly and treatment plan will be altered based on her tolerance to the treatment. I would not add Adriamycin to trastuzumab based chemotherapy due to risk of cardiotoxicity and a small but real risk of leukemia with Adriamycin. She will receive Onpro for Count support.\par  \par Discussed with patent and nephew, the role of adjuvant chemotherapy for curative intent and to decrease the risk of relapse. I reviewed HER-2/eladio positive breast cancers are very aggressive and has high risk of recurrence and metastasis. Herceptin based chemotherapy is associated with survival benefit. \par \par Discussed the expected and unexpected side effects of chemotherapy, including but not limited to hair loss, fatigue, change in taste, mouth sores, nausea, vomiting, diarrhea, infusion reaction, allergic reaction, significant myelosuppression, need for blood transfusion, infection, bleeding, cardiac toxicity, neuropathy, kidney injury, nerve pain, muscle pain and detrimental effect on liver and heart function. The patient understood all the potential side effects and signed an informed consent after discussing the risks, benefits and alternatives.\par \par Discussed to perform echocardiogram every 3 months and Mediport placement before starting chemotherapy. She will be premedicated with Emend, Aloxi and dexamethasone. I'll obtain blood work including complete metabolic profile, acute hepatitis panel and coagulation profile.  Left side Mediport in place, 07/20/2022 PET CT CAP:NICHOLAS . She has cardiac history and therefore will be referred to see Dr Baron. \par \par - Breast ca: Ms. ELLA PIÑA is starting on TCHP +onpro chemo. C1D1 today 8/12/22.  Tolerating with expected toxicity. No neuropathy, no arthralgia, LFT abnormalities or cardiopulm sx. She doesn’t have GI toxicity or electrolyte imbalance from chemo. Counts good. Check LFT and lytes today. \par \par \par \par - At risk for chemo induced cardiomyopathy: Check ECHO Q3m. 7/26/22 ECHO LVEF 58.2% next due OCT\par She has cardiac history and therefore will be referred to see Dr Baron\par - Chemo induced anemia-   BAseline Hbg WNL Grade 0. No transfusion needed. Monitor counts\par - Mild leukocytosis secondary to ONPRO. No neutropenia or thrombocytopenia. Counts ok to proceed with treatment today. \par - Mild mucositis-   miracle mouth wash or Carafate prn \par - Chemo induced diarrhea- Continue Imodium prn. IV fluids if sx worsen\par - Chemo induced N/V- Will get premedications with Emend, dexamethasone and Aloxi. she will continue dexamethasone for next 3 days for antinausea prophylaxis. She will use Reglan, Compazine as needed. IV fluid on D8.\par - GF induced bone pain- take Claritinx 5-7 days. \par - Chemo induced dysgeusia, wt loss and fatigue: Encourage p.o. fluids, small frequent meals. 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 She is also a candidate for radiation and endocrine therapy.\par \par - Instructed to call office and go directly to the emergency room with fever more than 100.4, shaking chills, productive cough, sore throat, shortness of breath or urinary symptoms. Patient verbalized understanding and agreement.\par \par RTO q 3 w for tx D1 and D8 for MD/NP\par \par \par \par \par \par \par \par \par \par \par \par \par \par \par \par

## 2022-08-25 NOTE — REASON FOR VISIT
[Follow-Up Visit] : a follow-up [Other: _____] : [unfilled] [FreeTextEntry2] : Invasive poorly differentiated ductal carcinoma of the right breast

## 2022-08-30 NOTE — PHYSICAL EXAM
[Restricted in physically strenuous activity but ambulatory and able to carry out work of a light or sedentary nature] : Status 1- Restricted in physically strenuous activity but ambulatory and able to carry out work of a light or sedentary nature, e.g., light house work, office work [Normal] : affect appropriate [de-identified] : Left sided med port in place CDI [de-identified] :  Well healed Right breast lumpectomy scar noted, left breast wnl

## 2022-08-30 NOTE — ASSESSMENT
[FreeTextEntry1] : Ms. ELLA PIÑA is a 72 year old female with stage IB  ( pT2, N1a,Mx ) ER/ [place]/ HER-2/eladio positive Invasive poorly differentiated ductal carcinoma of the right breast. She is s/p lumpectomy on 6/28/22.\par \par Discussed chemotherapy with Taxotere cytoxan and Herceptin every 3 weeks for 6 cycles followed by Herceptin for one year based on BCIRG 006 study data. We also discussed to add Pertuzumab based on the results from APHINITY Trial which showed improved outcomes with addition of a second HER2-targeted drug \par (pertuzumab) to standard-of-care trastuzumab. The benefit from pertuzumab appeared slightly greater among patients with node-positive disease. We discussed that patient is elderly and treatment plan will be altered based on her tolerance to the treatment. I would not add Adriamycin to trastuzumab based chemotherapy due to risk of cardiotoxicity and a small but real risk of leukemia with Adriamycin. She will receive Onpro for Count support.\par  \par Discussed with patent and nephew, the role of adjuvant chemotherapy for curative intent and to decrease the risk of relapse. I reviewed HER-2/eladio positive breast cancers are very aggressive and has high risk of recurrence and metastasis. Herceptin based chemotherapy is associated with survival benefit. \par \par - Breast ca: Ms. ELLA PIÑA is starting on TCHP +onpro chemo. C1D1 on  8/12/22 She is here .today 8/19/22 C1D8 for  IV fluids. Clinically good response. Tolerating with expected toxicity. No neuropathy, no arthralgia, LFT abnormalities or cardiopulm sx. She has mild diarrhea but no other GI toxicity or electrolyte imbalance from chemo. Counts good. Check LFT and lytes today. \par \par - At risk for chemo induced cardiomyopathy: Check ECHO Q3m. 7/26/22 ECHO LVEF 58.2% next due OCT\par She has cardiac history and therefore will be referred to see Dr Baron\par - Chemo induced anemia-   BAseline Hbg WNL Grade 0. No transfusion needed. Monitor counts\par - Mild leukocytosis secondary to ONPRO. No neutropenia or thrombocytopenia. Counts ok to proceed with treatment today. \par - Mild mucositis-   miracle mouth wash or Carafate prn \par - Chemo induced diarrhea- Continue Imodium prn. IV fluids if sx worsen\par - Chemo induced N/V- Will get premedications with Emend, dexamethasone and Aloxi. she will continue dexamethasone for next 3 days for antinausea prophylaxis. She will use Reglan, Compazine as needed. IV fluid on D8.\par - GF induced bone pain- take Claritinx 5-7 days. \par - Chemo induced dysgeusia, wt loss and fatigue: Encourage p.o. fluids, small frequent meals. 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 She is also a candidate for radiation and endocrine therapy.\par - Left side Mediport in place, 07/20/2022 PET CT CAP:NICHOLAS .\par - Instructed to call office and go directly to the emergency room with fever more than 100.4, shaking chills, productive cough, sore throat, shortness of breath or urinary symptoms. Patient verbalized understanding and agreement.\par \par Discussed the expected and unexpected side effects of chemotherapy, including but not limited to hair loss, fatigue, change in taste, mouth sores, nausea, vomiting, diarrhea, infusion reaction, allergic reaction, significant myelosuppression, need for blood transfusion, infection, bleeding, cardiac toxicity, neuropathy, kidney injury, nerve pain, muscle pain and detrimental effect on liver and heart function. The patient understood all the potential side effects and signed an informed consent after discussing the risks, benefits and alternatives.\par \par IV fluids and BW ordered in [facility]\par \par RTO q 3 w for tx D1 and D8 for MD/NP\par \par \par \par \par \par \par \par \par \par \par \par \par \par \par \par

## 2022-08-30 NOTE — HISTORY OF PRESENT ILLNESS
[T: ___] : T[unfilled] [N: ___] : N[unfilled] [M: ___] : M[unfilled] [AJCC Stage: ____] : AJCC Stage: [unfilled] [3 - Distress Level] : Distress Level: 3 [de-identified] : Ms.Linda Salazar is a 72 year old female here for an evaluation of breast cancer. Her oncologic history is as follows:\par \par She underwent routine breast imaging on 4/27/22 (BIRADS 0) which showed a right breast mass calcifications for which additional imaging is rec. Additional right breast imaging \par on 5/27/22 ( BIRADS 5) showed a persistent mass with spiculation and calcifications in the posterior lower slightly outer right breast on mammo which likely corresponds to a newly sonographically seen highly suspicious irregular hypoechoic mass at 6:00 on sono for which US biopsy is rec. \par  \par She underwent  right breast 6:00, 8 cmfn lesion core biopsy on 6/9/2022 which showed Invasive poorly differentiated ductal carcinoma, (invasive mammary carcinoma,NST), [place] score 8/9, measuring 1.3 cm, ER+ 95%, PgR+ 25%,HER-2 +. Ductal carcinoma in situ, solid pattern with high grade,atypia, comedo necrosis and microcalcifications present. No lymphovascular permeation seen.\par \par She underwent a breast MRI on 6/16/22 (BIRADS 6) which showed 3.0 cm biopsy proven malignancy at the 6:00 position of the right breast with adjacent small satellite nodules. There was no MRI evidence of multicentric or contralateral disease.\par \par She underwent right breast lumpectomy on 6/28/22 which revealed Invasive poorly differentiated ductal carcinoma, [place] grad 3, measuring 22 mm,.Margin were negative, Lymphovascular invasion was not seen. High grade, cribriform, solid type DCIS, was noted.Two sentinel lymph node were removed and  one was positive for metastasis measuring 0.7 cm..\par \par PCP retired, wants a new one\par [facility],will establish cardio \par \par  [de-identified] : Ms. ELLA PIÑA is a 72 year old female here for f/u appointment for ER/ [place]/ HER-2/eladio positive right breast cancer dx 6/2022.  S/p lumpectomy. started TCHP on 8/12/22\par \par Started  TCHP+ onpro #1 on 8/12/22 Counts good\par Chemo expected and unexpected side effects discussed, including but not limited to hair loss, fatigue, change in taste, mouth sores, nausea, vomiting, diarrhea, infusion reaction, allergic reaction, significant myelosuppression, need for blood transfusion, infection, bleeding, cardiac toxicity, neuropathy, kidney injury, nerve pain, muscle pain and detrimental effect on liver and heart function. .\par Medication for symptom management reviewed and questions answered\par 7/26/22 ECHO LVEF 58.2%\par 07/20/2022 PET CT CAP:NICHOLAS\par EKG done , Chemo consent obtained \par 8/11/22 Left side Mediport in place, catheter tip at the distal SVC\par Patient is a anxious about stating chemo today\par Emotional support given\par \par \par She is s/p TCHP+ onpro on 8/12/22, she is here today 8/19/22 for IV fluids. \par She reports mild to moderate fatigue and altered taste x 3 days. She was able to function, maintained PO intake, weight stable. She had mild nausea, took Reglan, no vomiting. SHe had diarrhea, used imodium. No mouth sores. No Bone pain, no neuropathy, no fevers. No cardio pulm sx. She has an appointment and will ne f/b cardio onc  Dr Baron due to her cardiac history..\par \par \par \par  \par

## 2022-09-02 ENCOUNTER — RESULT REVIEW (OUTPATIENT)
Age: 72
End: 2022-09-02

## 2022-09-02 ENCOUNTER — APPOINTMENT (OUTPATIENT)
Dept: HEMATOLOGY ONCOLOGY | Facility: CLINIC | Age: 72
End: 2022-09-02

## 2022-09-02 ENCOUNTER — APPOINTMENT (OUTPATIENT)
Dept: INFUSION THERAPY | Facility: HOSPITAL | Age: 72
End: 2022-09-02

## 2022-09-02 LAB
BASOPHILS # BLD AUTO: 0.01 K/UL — SIGNIFICANT CHANGE UP (ref 0–0.2)
BASOPHILS NFR BLD AUTO: 0.1 % — SIGNIFICANT CHANGE UP (ref 0–2)
EOSINOPHIL # BLD AUTO: 0 K/UL — SIGNIFICANT CHANGE UP (ref 0–0.5)
EOSINOPHIL NFR BLD AUTO: 0 % — SIGNIFICANT CHANGE UP (ref 0–6)
HCT VFR BLD CALC: 37.3 % — SIGNIFICANT CHANGE UP (ref 34.5–45)
HGB BLD-MCNC: 12.7 G/DL — SIGNIFICANT CHANGE UP (ref 11.5–15.5)
IMM GRANULOCYTES NFR BLD AUTO: 1.2 % — SIGNIFICANT CHANGE UP (ref 0–1.5)
LYMPHOCYTES # BLD AUTO: 0.75 K/UL — LOW (ref 1–3.3)
LYMPHOCYTES # BLD AUTO: 9.1 % — LOW (ref 13–44)
MCHC RBC-ENTMCNC: 31.5 PG — SIGNIFICANT CHANGE UP (ref 27–34)
MCHC RBC-ENTMCNC: 34 G/DL — SIGNIFICANT CHANGE UP (ref 32–36)
MCV RBC AUTO: 92.6 FL — SIGNIFICANT CHANGE UP (ref 80–100)
MONOCYTES # BLD AUTO: 0.03 K/UL — SIGNIFICANT CHANGE UP (ref 0–0.9)
MONOCYTES NFR BLD AUTO: 0.4 % — LOW (ref 2–14)
NEUTROPHILS # BLD AUTO: 7.39 K/UL — SIGNIFICANT CHANGE UP (ref 1.8–7.4)
NEUTROPHILS NFR BLD AUTO: 89.2 % — HIGH (ref 43–77)
NRBC # BLD: 0 /100 WBCS — SIGNIFICANT CHANGE UP (ref 0–0)
PLATELET # BLD AUTO: 398 K/UL — SIGNIFICANT CHANGE UP (ref 150–400)
RBC # BLD: 4.03 M/UL — SIGNIFICANT CHANGE UP (ref 3.8–5.2)
RBC # FLD: 13.6 % — SIGNIFICANT CHANGE UP (ref 10.3–14.5)
WBC # BLD: 8.28 K/UL — SIGNIFICANT CHANGE UP (ref 3.8–10.5)
WBC # FLD AUTO: 8.28 K/UL — SIGNIFICANT CHANGE UP (ref 3.8–10.5)

## 2022-09-09 ENCOUNTER — RESULT REVIEW (OUTPATIENT)
Age: 72
End: 2022-09-09

## 2022-09-09 ENCOUNTER — APPOINTMENT (OUTPATIENT)
Dept: HEMATOLOGY ONCOLOGY | Facility: CLINIC | Age: 72
End: 2022-09-09

## 2022-09-09 ENCOUNTER — APPOINTMENT (OUTPATIENT)
Dept: INFUSION THERAPY | Facility: HOSPITAL | Age: 72
End: 2022-09-09

## 2022-09-09 VITALS
HEART RATE: 88 BPM | WEIGHT: 152.56 LBS | HEIGHT: 60.39 IN | BODY MASS INDEX: 29.56 KG/M2 | RESPIRATION RATE: 16 BRPM | OXYGEN SATURATION: 96 % | DIASTOLIC BLOOD PRESSURE: 71 MMHG | SYSTOLIC BLOOD PRESSURE: 108 MMHG | TEMPERATURE: 97.3 F

## 2022-09-09 LAB
ALBUMIN SERPL ELPH-MCNC: 4.4 G/DL — SIGNIFICANT CHANGE UP (ref 3.3–5)
ALP SERPL-CCNC: 104 U/L — SIGNIFICANT CHANGE UP (ref 40–120)
ALT FLD-CCNC: 17 U/L — SIGNIFICANT CHANGE UP (ref 10–45)
ANION GAP SERPL CALC-SCNC: 14 MMOL/L — SIGNIFICANT CHANGE UP (ref 5–17)
AST SERPL-CCNC: 17 U/L — SIGNIFICANT CHANGE UP (ref 10–40)
BASOPHILS # BLD AUTO: 0.12 K/UL — SIGNIFICANT CHANGE UP (ref 0–0.2)
BASOPHILS NFR BLD AUTO: 1 % — SIGNIFICANT CHANGE UP (ref 0–2)
BILIRUB SERPL-MCNC: 0.2 MG/DL — SIGNIFICANT CHANGE UP (ref 0.2–1.2)
BLASTS # FLD: 1 % — HIGH (ref 0–0)
BUN SERPL-MCNC: 12 MG/DL — SIGNIFICANT CHANGE UP (ref 7–23)
CALCIUM SERPL-MCNC: 9.3 MG/DL — SIGNIFICANT CHANGE UP (ref 8.4–10.5)
CHLORIDE SERPL-SCNC: 103 MMOL/L — SIGNIFICANT CHANGE UP (ref 96–108)
CO2 SERPL-SCNC: 20 MMOL/L — LOW (ref 22–31)
CREAT SERPL-MCNC: 0.79 MG/DL — SIGNIFICANT CHANGE UP (ref 0.5–1.3)
EGFR: 78 ML/MIN/1.73M2 — SIGNIFICANT CHANGE UP
EOSINOPHIL # BLD AUTO: 0.12 K/UL — SIGNIFICANT CHANGE UP (ref 0–0.5)
EOSINOPHIL NFR BLD AUTO: 1 % — SIGNIFICANT CHANGE UP (ref 0–6)
GLUCOSE SERPL-MCNC: 106 MG/DL — HIGH (ref 70–99)
HCT VFR BLD CALC: 36.5 % — SIGNIFICANT CHANGE UP (ref 34.5–45)
HGB BLD-MCNC: 12.1 G/DL — SIGNIFICANT CHANGE UP (ref 11.5–15.5)
LYMPHOCYTES # BLD AUTO: 27 % — SIGNIFICANT CHANGE UP (ref 13–44)
LYMPHOCYTES # BLD AUTO: 3.25 K/UL — SIGNIFICANT CHANGE UP (ref 1–3.3)
MAGNESIUM SERPL-MCNC: 1.9 MG/DL — SIGNIFICANT CHANGE UP (ref 1.6–2.6)
MCHC RBC-ENTMCNC: 31.2 PG — SIGNIFICANT CHANGE UP (ref 27–34)
MCHC RBC-ENTMCNC: 33.2 G/DL — SIGNIFICANT CHANGE UP (ref 32–36)
MCV RBC AUTO: 94.1 FL — SIGNIFICANT CHANGE UP (ref 80–100)
MONOCYTES # BLD AUTO: 1.93 K/UL — HIGH (ref 0–0.9)
MONOCYTES NFR BLD AUTO: 16 % — HIGH (ref 2–14)
MYELOCYTES NFR BLD: 3 % — HIGH (ref 0–0)
NEUTROPHILS # BLD AUTO: 6.03 K/UL — SIGNIFICANT CHANGE UP (ref 1.8–7.4)
NEUTROPHILS NFR BLD AUTO: 50 % — SIGNIFICANT CHANGE UP (ref 43–77)
NRBC # BLD: 1 /100 — HIGH (ref 0–0)
NRBC # BLD: SIGNIFICANT CHANGE UP /100 WBCS (ref 0–0)
PLAT MORPH BLD: NORMAL — SIGNIFICANT CHANGE UP
PLATELET # BLD AUTO: 182 K/UL — SIGNIFICANT CHANGE UP (ref 150–400)
POTASSIUM SERPL-MCNC: 4 MMOL/L — SIGNIFICANT CHANGE UP (ref 3.5–5.3)
POTASSIUM SERPL-SCNC: 4 MMOL/L — SIGNIFICANT CHANGE UP (ref 3.5–5.3)
PROMYELOCYTES # FLD: 1 % — HIGH (ref 0–0)
PROT SERPL-MCNC: 6.6 G/DL — SIGNIFICANT CHANGE UP (ref 6–8.3)
RBC # BLD: 3.88 M/UL — SIGNIFICANT CHANGE UP (ref 3.8–5.2)
RBC # FLD: 14.3 % — SIGNIFICANT CHANGE UP (ref 10.3–14.5)
RBC BLD AUTO: SIGNIFICANT CHANGE UP
SODIUM SERPL-SCNC: 138 MMOL/L — SIGNIFICANT CHANGE UP (ref 135–145)
WBC # BLD: 12.05 K/UL — HIGH (ref 3.8–10.5)
WBC # FLD AUTO: 12.05 K/UL — HIGH (ref 3.8–10.5)

## 2022-09-09 PROCEDURE — 99215 OFFICE O/P EST HI 40 MIN: CPT

## 2022-09-16 ENCOUNTER — OUTPATIENT (OUTPATIENT)
Dept: OUTPATIENT SERVICES | Facility: HOSPITAL | Age: 72
LOS: 1 days | End: 2022-09-16

## 2022-09-16 ENCOUNTER — APPOINTMENT (OUTPATIENT)
Dept: CV DIAGNOSITCS | Facility: HOSPITAL | Age: 72
End: 2022-09-16

## 2022-09-16 DIAGNOSIS — Z90.89 ACQUIRED ABSENCE OF OTHER ORGANS: Chronic | ICD-10-CM

## 2022-09-16 DIAGNOSIS — Z96.641 PRESENCE OF RIGHT ARTIFICIAL HIP JOINT: Chronic | ICD-10-CM

## 2022-09-16 DIAGNOSIS — Z98.890 OTHER SPECIFIED POSTPROCEDURAL STATES: Chronic | ICD-10-CM

## 2022-09-16 DIAGNOSIS — Z86.79 PERSONAL HISTORY OF OTHER DISEASES OF THE CIRCULATORY SYSTEM: ICD-10-CM

## 2022-09-16 PROCEDURE — 93880 EXTRACRANIAL BILAT STUDY: CPT | Mod: 26

## 2022-09-20 ENCOUNTER — OUTPATIENT (OUTPATIENT)
Dept: OUTPATIENT SERVICES | Facility: HOSPITAL | Age: 72
LOS: 1 days | Discharge: HOME | End: 2022-09-20

## 2022-09-20 DIAGNOSIS — Z96.641 PRESENCE OF RIGHT ARTIFICIAL HIP JOINT: Chronic | ICD-10-CM

## 2022-09-20 DIAGNOSIS — Z90.89 ACQUIRED ABSENCE OF OTHER ORGANS: Chronic | ICD-10-CM

## 2022-09-20 DIAGNOSIS — Z98.890 OTHER SPECIFIED POSTPROCEDURAL STATES: Chronic | ICD-10-CM

## 2022-09-20 DIAGNOSIS — C50.919 MALIGNANT NEOPLASM OF UNSPECIFIED SITE OF UNSPECIFIED FEMALE BREAST: ICD-10-CM

## 2022-09-23 ENCOUNTER — APPOINTMENT (OUTPATIENT)
Dept: INFUSION THERAPY | Facility: HOSPITAL | Age: 72
End: 2022-09-23

## 2022-09-23 ENCOUNTER — RESULT REVIEW (OUTPATIENT)
Age: 72
End: 2022-09-23

## 2022-09-23 ENCOUNTER — APPOINTMENT (OUTPATIENT)
Dept: HEMATOLOGY ONCOLOGY | Facility: CLINIC | Age: 72
End: 2022-09-23

## 2022-09-23 LAB
BASOPHILS # BLD AUTO: 0.01 K/UL — SIGNIFICANT CHANGE UP (ref 0–0.2)
BASOPHILS NFR BLD AUTO: 0.1 % — SIGNIFICANT CHANGE UP (ref 0–2)
EOSINOPHIL # BLD AUTO: 0 K/UL — SIGNIFICANT CHANGE UP (ref 0–0.5)
EOSINOPHIL NFR BLD AUTO: 0 % — SIGNIFICANT CHANGE UP (ref 0–6)
HCT VFR BLD CALC: 33.3 % — LOW (ref 34.5–45)
HGB BLD-MCNC: 11.3 G/DL — LOW (ref 11.5–15.5)
IMM GRANULOCYTES NFR BLD AUTO: 1.5 % — HIGH (ref 0–0.9)
LYMPHOCYTES # BLD AUTO: 1.48 K/UL — SIGNIFICANT CHANGE UP (ref 1–3.3)
LYMPHOCYTES # BLD AUTO: 15.9 % — SIGNIFICANT CHANGE UP (ref 13–44)
MCHC RBC-ENTMCNC: 31.8 PG — SIGNIFICANT CHANGE UP (ref 27–34)
MCHC RBC-ENTMCNC: 33.9 G/DL — SIGNIFICANT CHANGE UP (ref 32–36)
MCV RBC AUTO: 93.8 FL — SIGNIFICANT CHANGE UP (ref 80–100)
MONOCYTES # BLD AUTO: 0.32 K/UL — SIGNIFICANT CHANGE UP (ref 0–0.9)
MONOCYTES NFR BLD AUTO: 3.4 % — SIGNIFICANT CHANGE UP (ref 2–14)
NEUTROPHILS # BLD AUTO: 7.35 K/UL — SIGNIFICANT CHANGE UP (ref 1.8–7.4)
NEUTROPHILS NFR BLD AUTO: 79.1 % — HIGH (ref 43–77)
NRBC # BLD: 0 /100 WBCS — SIGNIFICANT CHANGE UP (ref 0–0)
PLATELET # BLD AUTO: 299 K/UL — SIGNIFICANT CHANGE UP (ref 150–400)
RBC # BLD: 3.55 M/UL — LOW (ref 3.8–5.2)
RBC # FLD: 16.3 % — HIGH (ref 10.3–14.5)
WBC # BLD: 9.3 K/UL — SIGNIFICANT CHANGE UP (ref 3.8–10.5)
WBC # FLD AUTO: 9.3 K/UL — SIGNIFICANT CHANGE UP (ref 3.8–10.5)

## 2022-09-26 ENCOUNTER — NON-APPOINTMENT (OUTPATIENT)
Age: 72
End: 2022-09-26

## 2022-09-26 DIAGNOSIS — Z51.11 ENCOUNTER FOR ANTINEOPLASTIC CHEMOTHERAPY: ICD-10-CM

## 2022-09-26 DIAGNOSIS — R11.2 NAUSEA WITH VOMITING, UNSPECIFIED: ICD-10-CM

## 2022-09-26 DIAGNOSIS — Z51.89 ENCOUNTER FOR OTHER SPECIFIED AFTERCARE: ICD-10-CM

## 2022-09-30 ENCOUNTER — RESULT REVIEW (OUTPATIENT)
Age: 72
End: 2022-09-30

## 2022-09-30 ENCOUNTER — APPOINTMENT (OUTPATIENT)
Dept: INFUSION THERAPY | Facility: HOSPITAL | Age: 72
End: 2022-09-30

## 2022-09-30 ENCOUNTER — APPOINTMENT (OUTPATIENT)
Dept: HEMATOLOGY ONCOLOGY | Facility: CLINIC | Age: 72
End: 2022-09-30

## 2022-09-30 VITALS
HEART RATE: 106 BPM | SYSTOLIC BLOOD PRESSURE: 118 MMHG | OXYGEN SATURATION: 99 % | DIASTOLIC BLOOD PRESSURE: 74 MMHG | WEIGHT: 152.21 LBS | HEIGHT: 60 IN | TEMPERATURE: 97.2 F | RESPIRATION RATE: 17 BRPM | BODY MASS INDEX: 29.88 KG/M2

## 2022-09-30 DIAGNOSIS — T45.1X5A NAUSEA WITH VOMITING, UNSPECIFIED: ICD-10-CM

## 2022-09-30 DIAGNOSIS — R11.2 NAUSEA WITH VOMITING, UNSPECIFIED: ICD-10-CM

## 2022-09-30 LAB
ALBUMIN SERPL ELPH-MCNC: 4.3 G/DL — SIGNIFICANT CHANGE UP (ref 3.3–5)
ALP SERPL-CCNC: 105 U/L — SIGNIFICANT CHANGE UP (ref 40–120)
ALT FLD-CCNC: 13 U/L — SIGNIFICANT CHANGE UP (ref 10–45)
ANION GAP SERPL CALC-SCNC: 13 MMOL/L — SIGNIFICANT CHANGE UP (ref 5–17)
AST SERPL-CCNC: 21 U/L — SIGNIFICANT CHANGE UP (ref 10–40)
BASOPHILS # BLD AUTO: 0 K/UL — SIGNIFICANT CHANGE UP (ref 0–0.2)
BASOPHILS NFR BLD AUTO: 0 % — SIGNIFICANT CHANGE UP (ref 0–2)
BILIRUB SERPL-MCNC: 0.3 MG/DL — SIGNIFICANT CHANGE UP (ref 0.2–1.2)
BLASTS # FLD: 1 % — HIGH (ref 0–0)
BUN SERPL-MCNC: 11 MG/DL — SIGNIFICANT CHANGE UP (ref 7–23)
CALCIUM SERPL-MCNC: 9.8 MG/DL — SIGNIFICANT CHANGE UP (ref 8.4–10.5)
CHLORIDE SERPL-SCNC: 105 MMOL/L — SIGNIFICANT CHANGE UP (ref 96–108)
CO2 SERPL-SCNC: 20 MMOL/L — LOW (ref 22–31)
CREAT SERPL-MCNC: 0.69 MG/DL — SIGNIFICANT CHANGE UP (ref 0.5–1.3)
EGFR: 91 ML/MIN/1.73M2 — SIGNIFICANT CHANGE UP
EOSINOPHIL # BLD AUTO: 0 K/UL — SIGNIFICANT CHANGE UP (ref 0–0.5)
EOSINOPHIL NFR BLD AUTO: 0 % — SIGNIFICANT CHANGE UP (ref 0–6)
GLUCOSE SERPL-MCNC: 115 MG/DL — HIGH (ref 70–99)
HCT VFR BLD CALC: 33.9 % — LOW (ref 34.5–45)
HGB BLD-MCNC: 11.1 G/DL — LOW (ref 11.5–15.5)
LYMPHOCYTES # BLD AUTO: 2.69 K/UL — SIGNIFICANT CHANGE UP (ref 1–3.3)
LYMPHOCYTES # BLD AUTO: 22 % — SIGNIFICANT CHANGE UP (ref 13–44)
MAGNESIUM SERPL-MCNC: 2 MG/DL — SIGNIFICANT CHANGE UP (ref 1.6–2.6)
MCHC RBC-ENTMCNC: 31.4 PG — SIGNIFICANT CHANGE UP (ref 27–34)
MCHC RBC-ENTMCNC: 32.7 G/DL — SIGNIFICANT CHANGE UP (ref 32–36)
MCV RBC AUTO: 96 FL — SIGNIFICANT CHANGE UP (ref 80–100)
METAMYELOCYTES # FLD: 2 % — HIGH (ref 0–0)
MONOCYTES # BLD AUTO: 1.71 K/UL — HIGH (ref 0–0.9)
MONOCYTES NFR BLD AUTO: 14 % — SIGNIFICANT CHANGE UP (ref 2–14)
MYELOCYTES NFR BLD: 5 % — HIGH (ref 0–0)
NEUTROPHILS # BLD AUTO: 6.59 K/UL — SIGNIFICANT CHANGE UP (ref 1.8–7.4)
NEUTROPHILS NFR BLD AUTO: 54 % — SIGNIFICANT CHANGE UP (ref 43–77)
NRBC # BLD: 0 /100 — SIGNIFICANT CHANGE UP (ref 0–0)
NRBC # BLD: SIGNIFICANT CHANGE UP /100 WBCS (ref 0–0)
PLAT MORPH BLD: NORMAL — SIGNIFICANT CHANGE UP
PLATELET # BLD AUTO: 210 K/UL — SIGNIFICANT CHANGE UP (ref 150–400)
POTASSIUM SERPL-MCNC: 4.1 MMOL/L — SIGNIFICANT CHANGE UP (ref 3.5–5.3)
POTASSIUM SERPL-SCNC: 4.1 MMOL/L — SIGNIFICANT CHANGE UP (ref 3.5–5.3)
PROMYELOCYTES # FLD: 2 % — HIGH (ref 0–0)
PROT SERPL-MCNC: 6.4 G/DL — SIGNIFICANT CHANGE UP (ref 6–8.3)
RBC # BLD: 3.53 M/UL — LOW (ref 3.8–5.2)
RBC # FLD: 16.8 % — HIGH (ref 10.3–14.5)
RBC BLD AUTO: SIGNIFICANT CHANGE UP
SODIUM SERPL-SCNC: 138 MMOL/L — SIGNIFICANT CHANGE UP (ref 135–145)
WBC # BLD: 12.21 K/UL — HIGH (ref 3.8–10.5)
WBC # FLD AUTO: 12.21 K/UL — HIGH (ref 3.8–10.5)

## 2022-09-30 PROCEDURE — 99215 OFFICE O/P EST HI 40 MIN: CPT

## 2022-09-30 NOTE — PHYSICAL EXAM
[Restricted in physically strenuous activity but ambulatory and able to carry out work of a light or sedentary nature] : Status 1- Restricted in physically strenuous activity but ambulatory and able to carry out work of a light or sedentary nature, e.g., light house work, office work [Normal] : affect appropriate [de-identified] : Left sided med port in place CDI [de-identified] :  Well healed Right breast lumpectomy scar noted, left breast wnl

## 2022-09-30 NOTE — HISTORY OF PRESENT ILLNESS
[T: ___] : T[unfilled] [N: ___] : N[unfilled] [M: ___] : M[unfilled] [AJCC Stage: ____] : AJCC Stage: [unfilled] [3 - Distress Level] : Distress Level: 3 [de-identified] : Ms.Linda Salazar is a 72 year old female here for an evaluation of breast cancer. Her oncologic history is as follows:\par \par She underwent routine breast imaging on 4/27/22 (BIRADS 0) which showed a right breast mass calcifications for which additional imaging is rec. Additional right breast imaging \par on 5/27/22 ( BIRADS 5) showed a persistent mass with spiculation and calcifications in the posterior lower slightly outer right breast on mammo which likely corresponds to a newly sonographically seen highly suspicious irregular hypoechoic mass at 6:00 on sono for which US biopsy is rec. \par  \par She underwent  right breast 6:00, 8 cmfn lesion core biopsy on 6/9/2022 which showed Invasive poorly differentiated ductal carcinoma, (invasive mammary carcinoma,NST), [place] score 8/9, measuring 1.3 cm, ER+ 95%, PgR+ 25%,HER-2 +. Ductal carcinoma in situ, solid pattern with high grade,atypia, comedo necrosis and microcalcifications present. No lymphovascular permeation seen.\par \par She underwent a breast MRI on 6/16/22 (BIRADS 6) which showed 3.0 cm biopsy proven malignancy at the 6:00 position of the right breast with adjacent small satellite nodules. There was no MRI evidence of multicentric or contralateral disease.\par \par She underwent right breast lumpectomy on 6/28/22 which revealed Invasive poorly differentiated ductal carcinoma, [place] grad 3, measuring 22 mm,.Margin were negative, Lymphovascular invasion was not seen. High grade, cribriform, solid type DCIS, was noted.Two sentinel lymph node were removed and  one was positive for metastasis measuring 0.7 cm..\par \par PCP retired, wants a new one\par [facility],will establish cardio \par \par Started  TCHP+ onpro #1 on 8/12/22 Counts good\par Chemo expected and unexpected side effects discussed, including but not limited to hair loss, fatigue, change in taste, mouth sores, nausea, vomiting, diarrhea, infusion reaction, allergic reaction, significant myelosuppression, need for blood transfusion, infection, bleeding, cardiac toxicity, neuropathy, kidney injury, nerve pain, muscle pain and detrimental effect on liver and heart function. .\par Medication for symptom management reviewed and questions answered\par 7/26/22 ECHO LVEF 58.2%\par 07/20/2022 PET CT CAP:NICHOLAS\par EKG done , Chemo consent obtained \par 8/11/22 Left side Mediport in place, catheter tip at the distal SVC\par Patient is a anxious about stating chemo today\par Emotional support given\par \par She is s/p TCHP+ onpro on 8/12/22, she is here today 8/19/22 for IV fluids. \par She reports mild to moderate fatigue and altered taste x 3 days. She was able to function, maintained PO intake, weight stable. She had mild nausea, took Reglan, no vomiting. SHe had diarrhea, used imodium. No mouth sores. No Bone pain, no neuropathy, no fevers. No cardio pulm sx. She has an appointment and will ne f/b cardio onc  Dr Baron due to her cardiac history..\par  [de-identified] : Ms. ELLA PIÑA is a 72 year old female here for f/u appointment for ER/ [place]/ HER-2/eladio positive right breast cancer dx 6/2022.  S/p lumpectomy. started TCHP on 8/12/22\par \par \par S/p C2D8 today 9/9/22\par She reports mild-moderate fatigue and altered taste x 3-4 days after chemo. She was able to function, maintained PO intake, weight stable. She had mild nausea, took Reglan, no vomiting, or mouth sores. Mild diarrhea, controlled with imodium. No Bone pain, no neuropathy, no fevers No cardio pulm sx\par echo 7/2022, follows with Dr Yrn De La Cruz \par port ok

## 2022-09-30 NOTE — ASSESSMENT
[FreeTextEntry1] : Ms. ELLA PIÑA is a 72 year old female with stage IB  ( pT2, N1a,Mx ) ER/ [place]/ HER-2/eladio positive Invasive poorly differentiated ductal carcinoma of the right breast. She is s/p lumpectomy on 6/28/22. Adjuvant TCyHP started 8/2022\par \par \par - Breast ca: Ms. ELLA PIÑA is on TCHP +onpro chemo.\par C3D8 today 9/3/22\par  No neuropathy, mild arthralgia, no LFT abnormalities or cardiopulm sx. \par She has mild diarrhea but no electrolyte imbalance from chemo.\par  Check LFT and lytes today. \par Patient is on cytotoxic chemotherapy and needs intensive monitoring for severe toxicity.\par  CBC reviewed with the patient today to make sure she is not neutropenic from high risk cancer therapy drug.  We will continue to monitor CBC every 2 to 4 weeks for intense drug monitoring.\par -- She is also a candidate for radiation and endocrine therapy.\par - At risk for chemo induced cardiomyopathy: Check ECHO Q3m. 7/26/22 ECHO LVEF 58.2% next due OCT\par She has cardiac history and therefore will be referred to see Dr Baron\par - Chemo induced anemia-  GD1 . No transfusion needed. Monitor counts\par - Mild leukocytosis secondary to ONPRO. No neutropenia or thrombocytopenia. \par - Mild mucositis-   miracle mouth wash or Carafate prn \par - Chemo induced diarrhea- Continue Imodium prn. IV fluids if sx worsen\par - Chemo induced N/V- Will get premedications with Emend, dexamethasone and Aloxi. she will continue dexamethasone for next 3 days for antinausea prophylaxis. She will use Reglan as needed. IV fluid on D8.\par - GF induced bone pain- take Claritinx 5-7 days. \par - Chemo induced dysgeusia and fatigue: Encourage p.o. fluids, small frequent meals. 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 Patient verbalized understanding and agreement.\par \par CHEMO, IV fluids and BW ordered in [facility]\par \par RTO q 3 w for tx D1 and D8 for MD/NP\par

## 2022-09-30 NOTE — ASSESSMENT
[FreeTextEntry1] : Ms. 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 She is s/p lumpectomy on 6/28/22. Adjuvant TCyHP started 8/2022\par \par \par - Breast ca: Ms. ELLA PIÑA is on TCHP +onpro chemo.\par C2D8 today 9/9/22\par  No neuropathy, mild arthralgia, no LFT abnormalities or cardiopulm sx. \par She has mild diarrhea but no electrolyte imbalance from chemo.\par  Check LFT and lytes today. \par Patient is on cytotoxic chemotherapy and needs intensive monitoring for severe toxicity.\par  CBC reviewed with the patient today to make sure she is not neutropenic from high risk cancer therapy drug.  We will continue to monitor CBC every 2 to 4 weeks for intense drug monitoring.\par -- She is also a candidate for radiation and endocrine therapy.\par - At risk for chemo induced cardiomyopathy: Check ECHO Q3m. 7/26/22 ECHO LVEF 58.2% next due OCT\par She has cardiac history and therefore will be referred to see Dr Baron\par - Chemo induced anemia-  GD1 . No transfusion needed. Monitor counts\par - Mild leukocytosis secondary to ONPRO. No neutropenia or thrombocytopenia. \par - Mild mucositis-   miracle mouth wash or Carafate prn \par - Chemo induced diarrhea- Continue Imodium prn. IV fluids if sx worsen\par - Chemo induced N/V- Will get premedications with Emend, dexamethasone and Aloxi. she will continue dexamethasone for next 3 days for antinausea prophylaxis. She will use Reglan as needed. IV fluid on D8.\par - GF induced bone pain- take Claritinx 5-7 days. \par - Chemo induced dysgeusia and fatigue: Encourage p.o. fluids, small frequent meals. 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 Patient verbalized understanding and agreement.\par \par CHEMO, IV fluids and BW ordered in [facility]\par \par RTO q 3 w for tx D1 and D8 for MD/NP\par

## 2022-09-30 NOTE — PHYSICAL EXAM
[Restricted in physically strenuous activity but ambulatory and able to carry out work of a light or sedentary nature] : Status 1- Restricted in physically strenuous activity but ambulatory and able to carry out work of a light or sedentary nature, e.g., light house work, office work [Normal] : affect appropriate [de-identified] : Left sided med port in place CDI [de-identified] :  Well healed Right breast lumpectomy scar noted, left breast wnl

## 2022-09-30 NOTE — HISTORY OF PRESENT ILLNESS
[T: ___] : T[unfilled] [N: ___] : N[unfilled] [M: ___] : M[unfilled] [AJCC Stage: ____] : AJCC Stage: [unfilled] [3 - Distress Level] : Distress Level: 3 [de-identified] : Ms.Linda Salazar is a 72 year old female here for an evaluation of breast cancer. Her oncologic history is as follows:\par \par She underwent routine breast imaging on 4/27/22 (BIRADS 0) which showed a right breast mass calcifications for which additional imaging is rec. Additional right breast imaging \par on 5/27/22 ( BIRADS 5) showed a persistent mass with spiculation and calcifications in the posterior lower slightly outer right breast on mammo which likely corresponds to a newly sonographically seen highly suspicious irregular hypoechoic mass at 6:00 on sono for which US biopsy is rec. \par  \par She underwent  right breast 6:00, 8 cmfn lesion core biopsy on 6/9/2022 which showed Invasive poorly differentiated ductal carcinoma, (invasive mammary carcinoma,NST), [place] score 8/9, measuring 1.3 cm, ER+ 95%, PgR+ 25%,HER-2 +. Ductal carcinoma in situ, solid pattern with high grade,atypia, comedo necrosis and microcalcifications present. No lymphovascular permeation seen.\par \par She underwent a breast MRI on 6/16/22 (BIRADS 6) which showed 3.0 cm biopsy proven malignancy at the 6:00 position of the right breast with adjacent small satellite nodules. There was no MRI evidence of multicentric or contralateral disease.\par \par She underwent right breast lumpectomy on 6/28/22 which revealed Invasive poorly differentiated ductal carcinoma, [place] grad 3, measuring 22 mm,.Margin were negative, Lymphovascular invasion was not seen. High grade, cribriform, solid type DCIS, was noted.Two sentinel lymph node were removed and  one was positive for metastasis measuring 0.7 cm..\par \par PCP retired, wants a new one\par [facility],will [facility] \par \par Started  TCHP+ onpro #1 on 8/12/22 Counts good\par Chemo expected and unexpected side effects discussed, including but not limited to hair loss, fatigue, change in taste, mouth sores, nausea, vomiting, diarrhea, infusion reaction, allergic reaction, significant myelosuppression, need for blood transfusion, infection, bleeding, cardiac toxicity, neuropathy, kidney injury, nerve pain, muscle pain and detrimental effect on liver and heart function. .\par Medication for symptom management reviewed and questions answered\par 7/26/22 ECHO LVEF 58.2%\par 07/20/2022 PET CT CAP:NICHOLAS\par EKG done , Chemo consent obtained \par 8/11/22 Left side Mediport in place, catheter tip at the distal SVC\par Patient is a anxious about stating chemo today\par Emotional support given\par \par She is s/p TCHP+ onpro on 8/12/22, she is here today 8/19/22 for IV fluids. \par She reports mild to moderate fatigue and altered taste x 3 days. She was able to function, maintained PO intake, weight stable. She had mild nausea, took Reglan, no vomiting. SHe had diarrhea, used imodium. No mouth sores. No Bone pain, no neuropathy, no fevers. No cardio pulm sx. She has an appointment and will ne f/b cardio onc  Dr Baron due to her cardiac history..\par \par S/p C2D8 today 9/9/22\par She reports mild-moderate fatigue and altered taste x 3-4 days after chemo. She was able to function, maintained PO intake, weight stable. She had mild nausea, took Reglan, no vomiting, or mouth sores. Mild diarrhea, controlled with imodium. No Bone pain, no neuropathy, no fevers [de-identified] : Ms. ELLA PIÑA is a 72 year old female here for f/u appointment for ER/ [place]/ HER-2/eladio positive right breast cancer dx 6/2022.  S/p lumpectomy. started TCHP on 8/12/22\par \par s/p C3 day8 - 9/30/22\par She reports mild-moderate fatigue and altered taste x 3-4 days after chemo. She was able to function, maintained PO intake, weight stable. She had mild nausea, took Reglan, no vomiting, or mouth sores. Mild diarrhea, controlled with imodium. She has mild Bone pain, no neuropathy, no fevers. No cardio pulm sx\par echo 7/2022, follows with Dr Yrn De La Cruz \par port ok

## 2022-10-03 DIAGNOSIS — E86.0 DEHYDRATION: ICD-10-CM

## 2022-10-04 ENCOUNTER — NON-APPOINTMENT (OUTPATIENT)
Age: 72
End: 2022-10-04

## 2022-10-14 ENCOUNTER — APPOINTMENT (OUTPATIENT)
Dept: HEMATOLOGY ONCOLOGY | Facility: CLINIC | Age: 72
End: 2022-10-14

## 2022-10-14 ENCOUNTER — NON-APPOINTMENT (OUTPATIENT)
Age: 72
End: 2022-10-14

## 2022-10-14 ENCOUNTER — RESULT REVIEW (OUTPATIENT)
Age: 72
End: 2022-10-14

## 2022-10-14 ENCOUNTER — APPOINTMENT (OUTPATIENT)
Dept: INFUSION THERAPY | Facility: HOSPITAL | Age: 72
End: 2022-10-14

## 2022-10-14 LAB
BASOPHILS # BLD AUTO: 0.01 K/UL — SIGNIFICANT CHANGE UP (ref 0–0.2)
BASOPHILS NFR BLD AUTO: 0.1 % — SIGNIFICANT CHANGE UP (ref 0–2)
EOSINOPHIL # BLD AUTO: 0 K/UL — SIGNIFICANT CHANGE UP (ref 0–0.5)
EOSINOPHIL NFR BLD AUTO: 0 % — SIGNIFICANT CHANGE UP (ref 0–6)
HCT VFR BLD CALC: 31.1 % — LOW (ref 34.5–45)
HGB BLD-MCNC: 10.5 G/DL — LOW (ref 11.5–15.5)
IMM GRANULOCYTES NFR BLD AUTO: 1 % — HIGH (ref 0–0.9)
LYMPHOCYTES # BLD AUTO: 1.61 K/UL — SIGNIFICANT CHANGE UP (ref 1–3.3)
LYMPHOCYTES # BLD AUTO: 14.1 % — SIGNIFICANT CHANGE UP (ref 13–44)
MCHC RBC-ENTMCNC: 31.5 PG — SIGNIFICANT CHANGE UP (ref 27–34)
MCHC RBC-ENTMCNC: 33.8 G/DL — SIGNIFICANT CHANGE UP (ref 32–36)
MCV RBC AUTO: 93.4 FL — SIGNIFICANT CHANGE UP (ref 80–100)
MONOCYTES # BLD AUTO: 0.4 K/UL — SIGNIFICANT CHANGE UP (ref 0–0.9)
MONOCYTES NFR BLD AUTO: 3.5 % — SIGNIFICANT CHANGE UP (ref 2–14)
NEUTROPHILS # BLD AUTO: 9.27 K/UL — HIGH (ref 1.8–7.4)
NEUTROPHILS NFR BLD AUTO: 81.3 % — HIGH (ref 43–77)
NRBC # BLD: 0 /100 WBCS — SIGNIFICANT CHANGE UP (ref 0–0)
PLATELET # BLD AUTO: 353 K/UL — SIGNIFICANT CHANGE UP (ref 150–400)
RBC # BLD: 3.33 M/UL — LOW (ref 3.8–5.2)
RBC # FLD: 17 % — HIGH (ref 10.3–14.5)
WBC # BLD: 11.4 K/UL — HIGH (ref 3.8–10.5)
WBC # FLD AUTO: 11.4 K/UL — HIGH (ref 3.8–10.5)

## 2022-10-14 PROCEDURE — 99212 OFFICE O/P EST SF 10 MIN: CPT

## 2022-10-14 RX ORDER — NYSTATIN 100000 [USP'U]/G
100000 CREAM TOPICAL 3 TIMES DAILY
Qty: 1 | Refills: 0 | Status: COMPLETED | COMMUNITY
Start: 2022-10-14

## 2022-10-14 NOTE — PHYSICAL EXAM
[de-identified] : Moderate erythemia with glossy appearance under most of left breasst.  Early signs under right breast.

## 2022-10-14 NOTE — HISTORY OF PRESENT ILLNESS
[de-identified] : Called to chairside for Ms Martin's complaints of rash under left breast x 1 week.

## 2022-10-18 ENCOUNTER — APPOINTMENT (OUTPATIENT)
Dept: INTERNAL MEDICINE | Facility: CLINIC | Age: 72
End: 2022-10-18

## 2022-10-21 ENCOUNTER — RESULT REVIEW (OUTPATIENT)
Age: 72
End: 2022-10-21

## 2022-10-21 ENCOUNTER — APPOINTMENT (OUTPATIENT)
Dept: HEMATOLOGY ONCOLOGY | Facility: CLINIC | Age: 72
End: 2022-10-21

## 2022-10-21 ENCOUNTER — APPOINTMENT (OUTPATIENT)
Dept: INFUSION THERAPY | Facility: HOSPITAL | Age: 72
End: 2022-10-21

## 2022-10-21 VITALS
SYSTOLIC BLOOD PRESSURE: 107 MMHG | OXYGEN SATURATION: 98 % | WEIGHT: 151.01 LBS | BODY MASS INDEX: 29.49 KG/M2 | RESPIRATION RATE: 16 BRPM | HEART RATE: 66 BPM | DIASTOLIC BLOOD PRESSURE: 70 MMHG | TEMPERATURE: 96.8 F

## 2022-10-21 LAB
ALBUMIN SERPL ELPH-MCNC: 3.7 G/DL — SIGNIFICANT CHANGE UP (ref 3.3–5)
ALP SERPL-CCNC: 93 U/L — SIGNIFICANT CHANGE UP (ref 40–120)
ALT FLD-CCNC: 6 U/L — LOW (ref 10–45)
ANION GAP SERPL CALC-SCNC: 13 MMOL/L — SIGNIFICANT CHANGE UP (ref 5–17)
AST SERPL-CCNC: 14 U/L — SIGNIFICANT CHANGE UP (ref 10–40)
BASOPHILS # BLD AUTO: 0.11 K/UL — SIGNIFICANT CHANGE UP (ref 0–0.2)
BASOPHILS NFR BLD AUTO: 1 % — SIGNIFICANT CHANGE UP (ref 0–2)
BILIRUB SERPL-MCNC: 0.3 MG/DL — SIGNIFICANT CHANGE UP (ref 0.2–1.2)
BUN SERPL-MCNC: 12 MG/DL — SIGNIFICANT CHANGE UP (ref 7–23)
CALCIUM SERPL-MCNC: 9.2 MG/DL — SIGNIFICANT CHANGE UP (ref 8.4–10.5)
CHLORIDE SERPL-SCNC: 104 MMOL/L — SIGNIFICANT CHANGE UP (ref 96–108)
CO2 SERPL-SCNC: 21 MMOL/L — LOW (ref 22–31)
CREAT SERPL-MCNC: 0.92 MG/DL — SIGNIFICANT CHANGE UP (ref 0.5–1.3)
EGFR: 65 ML/MIN/1.73M2 — SIGNIFICANT CHANGE UP
EOSINOPHIL # BLD AUTO: 0 K/UL — SIGNIFICANT CHANGE UP (ref 0–0.5)
EOSINOPHIL NFR BLD AUTO: 0 % — SIGNIFICANT CHANGE UP (ref 0–6)
GLUCOSE SERPL-MCNC: 115 MG/DL — HIGH (ref 70–99)
HCT VFR BLD CALC: 29.7 % — LOW (ref 34.5–45)
HGB BLD-MCNC: 9.6 G/DL — LOW (ref 11.5–15.5)
LYMPHOCYTES # BLD AUTO: 19 % — SIGNIFICANT CHANGE UP (ref 13–44)
LYMPHOCYTES # BLD AUTO: 2.03 K/UL — SIGNIFICANT CHANGE UP (ref 1–3.3)
MAGNESIUM SERPL-MCNC: 1.8 MG/DL — SIGNIFICANT CHANGE UP (ref 1.6–2.6)
MCHC RBC-ENTMCNC: 32 PG — SIGNIFICANT CHANGE UP (ref 27–34)
MCHC RBC-ENTMCNC: 32.3 G/DL — SIGNIFICANT CHANGE UP (ref 32–36)
MCV RBC AUTO: 99 FL — SIGNIFICANT CHANGE UP (ref 80–100)
METAMYELOCYTES # FLD: 4 % — HIGH (ref 0–0)
MONOCYTES # BLD AUTO: 1.92 K/UL — HIGH (ref 0–0.9)
MONOCYTES NFR BLD AUTO: 18 % — HIGH (ref 2–14)
MYELOCYTES NFR BLD: 3 % — HIGH (ref 0–0)
NEUTROPHILS # BLD AUTO: 5.45 K/UL — SIGNIFICANT CHANGE UP (ref 1.8–7.4)
NEUTROPHILS NFR BLD AUTO: 49 % — SIGNIFICANT CHANGE UP (ref 43–77)
NEUTS BAND # BLD: 2 % — SIGNIFICANT CHANGE UP (ref 0–8)
NRBC # BLD: 0 /100 — SIGNIFICANT CHANGE UP (ref 0–0)
NRBC # BLD: SIGNIFICANT CHANGE UP /100 WBCS (ref 0–0)
PLAT MORPH BLD: NORMAL — SIGNIFICANT CHANGE UP
PLATELET # BLD AUTO: 201 K/UL — SIGNIFICANT CHANGE UP (ref 150–400)
POLYCHROMASIA BLD QL SMEAR: SLIGHT — SIGNIFICANT CHANGE UP
POTASSIUM SERPL-MCNC: 3.9 MMOL/L — SIGNIFICANT CHANGE UP (ref 3.5–5.3)
POTASSIUM SERPL-SCNC: 3.9 MMOL/L — SIGNIFICANT CHANGE UP (ref 3.5–5.3)
PROMYELOCYTES # FLD: 4 % — HIGH (ref 0–0)
PROT SERPL-MCNC: 5.9 G/DL — LOW (ref 6–8.3)
RBC # BLD: 3 M/UL — LOW (ref 3.8–5.2)
RBC # FLD: 17.3 % — HIGH (ref 10.3–14.5)
RBC BLD AUTO: ABNORMAL
SODIUM SERPL-SCNC: 137 MMOL/L — SIGNIFICANT CHANGE UP (ref 135–145)
WBC # BLD: 10.69 K/UL — HIGH (ref 3.8–10.5)
WBC # FLD AUTO: 10.69 K/UL — HIGH (ref 3.8–10.5)

## 2022-10-21 PROCEDURE — 99215 OFFICE O/P EST HI 40 MIN: CPT

## 2022-10-24 ENCOUNTER — NON-APPOINTMENT (OUTPATIENT)
Age: 72
End: 2022-10-24

## 2022-11-04 ENCOUNTER — APPOINTMENT (OUTPATIENT)
Dept: INFUSION THERAPY | Facility: HOSPITAL | Age: 72
End: 2022-11-04

## 2022-11-04 ENCOUNTER — RESULT REVIEW (OUTPATIENT)
Age: 72
End: 2022-11-04

## 2022-11-04 ENCOUNTER — APPOINTMENT (OUTPATIENT)
Dept: HEMATOLOGY ONCOLOGY | Facility: CLINIC | Age: 72
End: 2022-11-04

## 2022-11-04 ENCOUNTER — NON-APPOINTMENT (OUTPATIENT)
Age: 72
End: 2022-11-04

## 2022-11-04 ENCOUNTER — APPOINTMENT (OUTPATIENT)
Dept: CARDIOLOGY | Facility: CLINIC | Age: 72
End: 2022-11-04
Payer: MEDICARE

## 2022-11-04 LAB
BASOPHILS # BLD AUTO: 0.02 K/UL — SIGNIFICANT CHANGE UP (ref 0–0.2)
BASOPHILS NFR BLD AUTO: 0.2 % — SIGNIFICANT CHANGE UP (ref 0–2)
EOSINOPHIL # BLD AUTO: 0 K/UL — SIGNIFICANT CHANGE UP (ref 0–0.5)
EOSINOPHIL NFR BLD AUTO: 0 % — SIGNIFICANT CHANGE UP (ref 0–6)
HCT VFR BLD CALC: 31.8 % — LOW (ref 34.5–45)
HGB BLD-MCNC: 10.3 G/DL — LOW (ref 11.5–15.5)
IMM GRANULOCYTES NFR BLD AUTO: 1.9 % — HIGH (ref 0–0.9)
LYMPHOCYTES # BLD AUTO: 1.59 K/UL — SIGNIFICANT CHANGE UP (ref 1–3.3)
LYMPHOCYTES # BLD AUTO: 18.5 % — SIGNIFICANT CHANGE UP (ref 13–44)
MCHC RBC-ENTMCNC: 32 PG — SIGNIFICANT CHANGE UP (ref 27–34)
MCHC RBC-ENTMCNC: 32.4 G/DL — SIGNIFICANT CHANGE UP (ref 32–36)
MCV RBC AUTO: 98.8 FL — SIGNIFICANT CHANGE UP (ref 80–100)
MONOCYTES # BLD AUTO: 0.36 K/UL — SIGNIFICANT CHANGE UP (ref 0–0.9)
MONOCYTES NFR BLD AUTO: 4.2 % — SIGNIFICANT CHANGE UP (ref 2–14)
NEUTROPHILS # BLD AUTO: 6.48 K/UL — SIGNIFICANT CHANGE UP (ref 1.8–7.4)
NEUTROPHILS NFR BLD AUTO: 75.2 % — SIGNIFICANT CHANGE UP (ref 43–77)
NRBC # BLD: 0 /100 WBCS — SIGNIFICANT CHANGE UP (ref 0–0)
PLATELET # BLD AUTO: 273 K/UL — SIGNIFICANT CHANGE UP (ref 150–400)
RBC # BLD: 3.22 M/UL — LOW (ref 3.8–5.2)
RBC # FLD: 18.7 % — HIGH (ref 10.3–14.5)
WBC # BLD: 8.61 K/UL — SIGNIFICANT CHANGE UP (ref 3.8–10.5)
WBC # FLD AUTO: 8.61 K/UL — SIGNIFICANT CHANGE UP (ref 3.8–10.5)

## 2022-11-04 PROCEDURE — 93356 MYOCRD STRAIN IMG SPCKL TRCK: CPT

## 2022-11-04 PROCEDURE — 93306 TTE W/DOPPLER COMPLETE: CPT

## 2022-11-07 ENCOUNTER — APPOINTMENT (OUTPATIENT)
Dept: DERMATOLOGY | Facility: CLINIC | Age: 72
End: 2022-11-07

## 2022-11-07 VITALS — BODY MASS INDEX: 29.84 KG/M2 | HEIGHT: 60 IN | WEIGHT: 152 LBS

## 2022-11-07 PROCEDURE — 99204 OFFICE O/P NEW MOD 45 MIN: CPT

## 2022-11-07 RX ORDER — TRIAMCINOLONE ACETONIDE 1 MG/G
0.1 OINTMENT TOPICAL
Qty: 1 | Refills: 3 | Status: ACTIVE | COMMUNITY
Start: 2022-11-07 | End: 1900-01-01

## 2022-11-10 ENCOUNTER — NON-APPOINTMENT (OUTPATIENT)
Age: 72
End: 2022-11-10

## 2022-11-11 ENCOUNTER — APPOINTMENT (OUTPATIENT)
Dept: INFUSION THERAPY | Facility: HOSPITAL | Age: 72
End: 2022-11-11

## 2022-11-14 LAB — NT-PROBNP SERPL-MCNC: 188 PG/ML

## 2022-11-18 ENCOUNTER — OUTPATIENT (OUTPATIENT)
Dept: OUTPATIENT SERVICES | Facility: HOSPITAL | Age: 72
LOS: 1 days | Discharge: HOME | End: 2022-11-18
Payer: MEDICARE

## 2022-11-18 ENCOUNTER — APPOINTMENT (OUTPATIENT)
Dept: CARDIOLOGY | Facility: CLINIC | Age: 72
End: 2022-11-18

## 2022-11-18 VITALS
RESPIRATION RATE: 16 BRPM | BODY MASS INDEX: 29.69 KG/M2 | TEMPERATURE: 98 F | HEART RATE: 85 BPM | DIASTOLIC BLOOD PRESSURE: 75 MMHG | OXYGEN SATURATION: 98 % | HEIGHT: 60 IN | WEIGHT: 151.24 LBS | SYSTOLIC BLOOD PRESSURE: 123 MMHG

## 2022-11-18 DIAGNOSIS — Z90.89 ACQUIRED ABSENCE OF OTHER ORGANS: Chronic | ICD-10-CM

## 2022-11-18 DIAGNOSIS — Z71.84 ENC FOR HEALTH COUNSELING RELATED TO TRAVEL: ICD-10-CM

## 2022-11-18 DIAGNOSIS — I42.9 HEART FAILURE, UNSPECIFIED: ICD-10-CM

## 2022-11-18 DIAGNOSIS — I47.29 OTHER VENTRICULAR TACHYCARDIA: ICD-10-CM

## 2022-11-18 DIAGNOSIS — I50.9 HEART FAILURE, UNSPECIFIED: ICD-10-CM

## 2022-11-18 DIAGNOSIS — C50.919 MALIGNANT NEOPLASM OF UNSPECIFIED SITE OF UNSPECIFIED FEMALE BREAST: ICD-10-CM

## 2022-11-18 DIAGNOSIS — Z96.641 PRESENCE OF RIGHT ARTIFICIAL HIP JOINT: Chronic | ICD-10-CM

## 2022-11-18 DIAGNOSIS — Z98.890 OTHER SPECIFIED POSTPROCEDURAL STATES: Chronic | ICD-10-CM

## 2022-11-18 DIAGNOSIS — R53.83 OTHER FATIGUE: ICD-10-CM

## 2022-11-18 PROCEDURE — 99214 OFFICE O/P EST MOD 30 MIN: CPT

## 2022-11-18 PROCEDURE — 93000 ELECTROCARDIOGRAM COMPLETE: CPT

## 2022-11-18 PROCEDURE — 93010 ELECTROCARDIOGRAM REPORT: CPT

## 2022-11-18 NOTE — HISTORY OF PRESENT ILLNESS
[FreeTextEntry1] : Interval History:\par Nearing completion of adjuvant TC (2 more treatments remain).\par Fatigue, but no chest pain or palpitations.\par She takes pravastatin 80 mg daily.\par Reviewed lipids with her.\par \par \par ELLA PIÑA is a 72 year woman with a history of hypercholesterolemia diagnosed with HER2+ right breast cancer in April 2022. Over a decade prior she had a ventricular arrhythmia during an exercise treadmill stress test. She was referred for cardiac catheterization which showed no CAD. She was recommended to take metoprolol and remains on 25 XL daily since. She denies any symptoms. Does not recall why the stress test was ordered. She denies any palpitations, syncope, near syncope, or exertional dyspnea.\par \michel Takes pravastatin for "very high" lipids, which her mother also had. Reports intolerance to other statins previously and mild carotid plaque noted on prior duplex sonogram, all of this over a decade or more ago with her primary care doctor who retired and has since passed on.\par \michel Lives in [place].\par \par Right hip replaced March 31, 2022\par Retired , \par \par \par Cardiovascular Summary:\par ----------------------------------------------\par ECG:\par 11/18/2022: NSR 83 bpm, normal ECG\par 8/22/2022: NSR and normal ECG\par ----------------------------------------------\par Echo:\par 11/1/2022: EF 55-60 %, GLS - 19.2 %\par 7/26/2022: normal LVEF 58 % and GLS -18.5\par ----------------------------------------------\par Carotid:\par 9/16/2022: minimal heterogenous plaque

## 2022-11-18 NOTE — ASSESSMENT
[FreeTextEntry1] : 72 year old woman with risk factors for HER2 cardiotoxicity, (age, HTN, ? arrhythmia). Baseline echo normal EF and strain and no evident structural heart disease.  We discussed the risk factors, current recommendations, and approach to cardiotoxicity monitoring during breast cancer therapy, including the complementary role of imaging by echo with global longitudinal strain, biomarkers, electrocardiogram, and clinical examination to inform shared decision.\par \par Prior cardiology records were obtained and reviewed. She had probable rate related LBBB during exercise treadmill testing in 2018 and cardiac cath 2/9/2018 showed normal coronary arteries. \par \par # Atherosclerosis: vacular calcifications reported on staging PET/CT \par - continue aspirin and pravastatin \par \par # Risk for cardiomyopathy\par - Continue monitoring with serial echo/GLS every 3 months per protocol during adjuvant HP therapy.\par - If any symptoms or abnormalities in the echo/GLS, repeat biomarkers\par \par \par # Hypercholesterolemia: lipids acceptable on pravastatin 80 mg daily\par - can continue for now\par \par # Pre-diabetes: A1c 6.1 %\par - to consider SGLT-2\par \par Follow up in 3 months around time of next echo.\par \par Above recommendations discussed with the patient and all questions were answered to the best of my ability and to her apparent satisfaction. \par \par

## 2022-11-18 NOTE — PHYSICAL EXAM
[Normal] : well developed, well nourished, no acute distress [Normal Conjunctiva] : normal conjunctiva [No Xanthelasma] : no xanthelasma [Normal Venous Pressure] : normal venous pressure [No Carotid Bruit] : no carotid bruit [Normal S1, S2] : normal S1, S2 [No Murmur] : no murmur [No Rub] : no rub [No Gallop] : no gallop [Clear Lung Fields] : clear lung fields [Good Air Entry] : good air entry [No Respiratory Distress] : no respiratory distress  [Soft] : abdomen soft [Normal Gait] : normal gait [Gait - Sufficient for Exercise Testing] : gait - sufficient for exercise testing [No Edema] : no edema [No Cyanosis] : no cyanosis [No Clubbing] : no clubbing [No Varicosities] : no varicosities [No Rash] : no rash [Moves all extremities] : moves all extremities [No Focal Deficits] : no focal deficits [Normal Speech] : normal speech [Alert and Oriented] : alert and oriented [Normal memory] : normal memory

## 2022-11-25 ENCOUNTER — RESULT REVIEW (OUTPATIENT)
Age: 72
End: 2022-11-25

## 2022-11-25 ENCOUNTER — APPOINTMENT (OUTPATIENT)
Dept: HEMATOLOGY ONCOLOGY | Facility: CLINIC | Age: 72
End: 2022-11-25

## 2022-11-25 ENCOUNTER — APPOINTMENT (OUTPATIENT)
Dept: INFUSION THERAPY | Facility: HOSPITAL | Age: 72
End: 2022-11-25

## 2022-11-25 LAB
BASOPHILS # BLD AUTO: 0 K/UL — SIGNIFICANT CHANGE UP (ref 0–0.2)
BASOPHILS NFR BLD AUTO: 0 % — SIGNIFICANT CHANGE UP (ref 0–2)
EOSINOPHIL # BLD AUTO: 0.01 K/UL — SIGNIFICANT CHANGE UP (ref 0–0.5)
EOSINOPHIL NFR BLD AUTO: 0.1 % — SIGNIFICANT CHANGE UP (ref 0–6)
HCT VFR BLD CALC: 28.6 % — LOW (ref 34.5–45)
HGB BLD-MCNC: 9.3 G/DL — LOW (ref 11.5–15.5)
IMM GRANULOCYTES NFR BLD AUTO: 1.4 % — HIGH (ref 0–0.9)
LYMPHOCYTES # BLD AUTO: 1.18 K/UL — SIGNIFICANT CHANGE UP (ref 1–3.3)
LYMPHOCYTES # BLD AUTO: 15 % — SIGNIFICANT CHANGE UP (ref 13–44)
MCHC RBC-ENTMCNC: 32.5 G/DL — SIGNIFICANT CHANGE UP (ref 32–36)
MCHC RBC-ENTMCNC: 32.5 PG — SIGNIFICANT CHANGE UP (ref 27–34)
MCV RBC AUTO: 100 FL — SIGNIFICANT CHANGE UP (ref 80–100)
MONOCYTES # BLD AUTO: 0.62 K/UL — SIGNIFICANT CHANGE UP (ref 0–0.9)
MONOCYTES NFR BLD AUTO: 7.9 % — SIGNIFICANT CHANGE UP (ref 2–14)
NEUTROPHILS # BLD AUTO: 5.96 K/UL — SIGNIFICANT CHANGE UP (ref 1.8–7.4)
NEUTROPHILS NFR BLD AUTO: 75.6 % — SIGNIFICANT CHANGE UP (ref 43–77)
NRBC # BLD: 0 /100 WBCS — SIGNIFICANT CHANGE UP (ref 0–0)
PLATELET # BLD AUTO: 224 K/UL — SIGNIFICANT CHANGE UP (ref 150–400)
RBC # BLD: 2.86 M/UL — LOW (ref 3.8–5.2)
RBC # FLD: 17.8 % — HIGH (ref 10.3–14.5)
WBC # BLD: 7.88 K/UL — SIGNIFICANT CHANGE UP (ref 3.8–10.5)
WBC # FLD AUTO: 7.88 K/UL — SIGNIFICANT CHANGE UP (ref 3.8–10.5)

## 2022-11-25 PROCEDURE — 99214 OFFICE O/P EST MOD 30 MIN: CPT

## 2022-11-27 NOTE — ASSESSMENT
[FreeTextEntry1] : Ms. ELLA PIÑA is a 72 year old female with stage IB  ( pT2, N1a,Mx ) ER/ [place]/ HER-2/eladio positive Invasive poorly differentiated ductal carcinoma of the right breast. She is s/p lumpectomy on 6/28/22. Adjuvant TCyHP started 8/2022\par \par \par - Breast ca: Ms. ELLA PIÑA is on TCHP +onpro chemo.\par  C4 day 8 - 10/21/22\par  Mild neuropathy, mild arthralgia, no LFT abnormalities or cardiopulm sx. \par She has mild diarrhea but no electrolyte imbalance from chemo. She reports she had a syncopal episode x 1 on D15 in the morning before breakfast. no CP or head injury, most likely dehydration will Check LFT and lytes today. \par Patient is on cytotoxic chemotherapy and needs intensive monitoring for severe toxicity.\par  CBC reviewed with the patient today to make sure she is not neutropenic from high risk cancer therapy drug.  We will continue to monitor CBC every 2 to 4 weeks for intense drug monitoring.\par -- She is also a candidate for radiation and endocrine therapy.\par -Rash- erythematous circular rash underneath left breast left left axilla x 1 week, mild itching /soreness. No s/s of infection.will start Nystatin cream,fluconazole  and referral to derm\par - At risk for chemo induced cardiomyopathy: Check ECHO Q3m. 7/26/22 ECHO LVEF 58.2% next due OCT\par She has cardiac history and therefore will be referred to see Dr Baron\par - Chemo induced anemia-  GD1 . No transfusion needed. Monitor counts\par - Mild leukocytosis secondary to ONPRO. No neutropenia or thrombocytopenia. \par - Mild mucositis-   miracle mouth wash or Carafate prn \par - Chemo induced diarrhea- Continue Imodium prn. IV fluids if sx worsen\par - Chemo induced N/V- Will get premedications with Emend, dexamethasone and Aloxi. she will continue dexamethasone for next 3 days for antinausea prophylaxis. She will use Reglan as needed. IV fluid on D8.\par - GF induced bone pain- take Claritinx 5-7 days. \par - Chemo induced dysgeusia and fatigue: Encourage p.o. fluids, small frequent meals. IV fluids on day 8.\par - Chemo induced neuropathy- Mild tingling and numbness to toes BL, no pain or burming will monitor\par - Alopecia- prescription for wig given.\par - Emotional support provided, all questions answered.\par - Left side Mediport in place, 07/20/2022 PET CT CAP:NICHOLAS .\par - Instructed to call office and go directly to the emergency room with fever more than 100.4, shaking chills, productive cough, sore throat, shortness of breath or urinary symptoms. Patient verbalized understanding and agreement.\par \par CHEMO, IV fluids and BW ordered in [facility]\par \par RTO q 3 w for tx D1 and D8 for MD/NP\par

## 2022-11-27 NOTE — HISTORY OF PRESENT ILLNESS
[T: ___] : T[unfilled] [N: ___] : N[unfilled] [M: ___] : M[unfilled] [AJCC Stage: ____] : AJCC Stage: [unfilled] [3 - Distress Level] : Distress Level: 3 [de-identified] : Ms.Linda Salazar is a 72 year old female here for an evaluation of breast cancer. Her oncologic history is as follows:\par \par She underwent routine breast imaging on 4/27/22 (BIRADS 0) which showed a right breast mass calcifications for which additional imaging is rec. Additional right breast imaging \par on 5/27/22 ( BIRADS 5) showed a persistent mass with spiculation and calcifications in the posterior lower slightly outer right breast on mammo which likely corresponds to a newly sonographically seen highly suspicious irregular hypoechoic mass at 6:00 on sono for which US biopsy is rec. \par  \par She underwent  right breast 6:00, 8 cmfn lesion core biopsy on 6/9/2022 which showed Invasive poorly differentiated ductal carcinoma, (invasive mammary carcinoma,NST), [place] score 8/9, measuring 1.3 cm, ER+ 95%, PgR+ 25%,HER-2 +. Ductal carcinoma in situ, solid pattern with high grade,atypia, comedo necrosis and microcalcifications present. No lymphovascular permeation seen.\par \par She underwent a breast MRI on 6/16/22 (BIRADS 6) which showed 3.0 cm biopsy proven malignancy at the 6:00 position of the right breast with adjacent small satellite nodules. There was no MRI evidence of multicentric or contralateral disease.\par \par She underwent right breast lumpectomy on 6/28/22 which revealed Invasive poorly differentiated ductal carcinoma, [place] grad 3, measuring 22 mm,.Margin were negative, Lymphovascular invasion was not seen. High grade, cribriform, solid type DCIS, was noted.Two sentinel lymph node were removed and  one was positive for metastasis measuring 0.7 cm..\par \par PCP retired, wants a new one\par [facility],will [facility] \par \par Started  TCHP+ onpro #1 on 8/12/22 Counts good\par Chemo expected and unexpected side effects discussed, including but not limited to hair loss, fatigue, change in taste, mouth sores, nausea, vomiting, diarrhea, infusion reaction, allergic reaction, significant myelosuppression, need for blood transfusion, infection, bleeding, cardiac toxicity, neuropathy, kidney injury, nerve pain, muscle pain and detrimental effect on liver and heart function. .\par Medication for symptom management reviewed and questions answered\par 7/26/22 ECHO LVEF 58.2%\par 07/20/2022 PET CT CAP:NICHOLAS\par EKG done , Chemo consent obtained \par 8/11/22 Left side Mediport in place, catheter tip at the distal SVC\par Patient is a anxious about stating chemo today\par Emotional support given\par \par She is s/p TCHP+ onpro on 8/12/22, she is here today 8/19/22 for IV fluids. \par She reports mild to moderate fatigue and altered taste x 3 days. She was able to function, maintained PO intake, weight stable. She had mild nausea, took Reglan, no vomiting. SHe had diarrhea, used imodium. No mouth sores. No Bone pain, no neuropathy, no fevers. No cardio pulm sx. She has an appointment and will ne f/b cardio onc  Dr Baron due to her cardiac history..\par \par S/p C2D8 today 9/9/22\par She reports mild-moderate fatigue and altered taste x 3-4 days after chemo. She was able to function, maintained PO intake, weight stable. She had mild nausea, took Reglan, no vomiting, or mouth sores. Mild diarrhea, controlled with imodium. No Bone pain, no neuropathy, no fevers [de-identified] : Ms. ELLA PIÑA is a 72 year old female here for f/u appointment for ER/ [place]/ HER-2/eladio positive right breast cancer dx 6/2022.  S/p lumpectomy. started TCHP on 8/12/22\par \par s/p C4 day 8 - 10/21/22\par She reports she had a syncopal episode x 1 on D15 in the morning before breakfast. no CP or head injury, most likely dehydration. Reports some frequent tingling and numbness to toes BL, no pain or burning. She reports large erythematous circular rash underneath left breast left left axilla x 1 week, mild itching /soreness. No fever , drainage or other s/s of infection.\par She reports moderate fatigue and altered taste x 3-4 days after chemo. She was able to function, maintained PO intake, weight stable. She had mild nausea, took Reglan, no vomiting, or mouth sores. Mild diarrhea, controlled with imodium. She has mild Bone pain, no neuropathy, no fevers. No cardio pulm sx\par echo 7/2022, follows with Dr Yrn De La Cruz \par port ok

## 2022-11-27 NOTE — PHYSICAL EXAM
[Restricted in physically strenuous activity but ambulatory and able to carry out work of a light or sedentary nature] : Status 1- Restricted in physically strenuous activity but ambulatory and able to carry out work of a light or sedentary nature, e.g., light house work, office work [Normal] : affect appropriate [de-identified] : Left sided med port in place CDI [de-identified] :  Well healed Right breast lumpectomy scar noted, Left breast large erythematous circular macular rash noted under left breast and under left axilla

## 2022-11-28 DIAGNOSIS — Z51.11 ENCOUNTER FOR ANTINEOPLASTIC CHEMOTHERAPY: ICD-10-CM

## 2022-11-28 DIAGNOSIS — R11.2 NAUSEA WITH VOMITING, UNSPECIFIED: ICD-10-CM

## 2022-11-28 DIAGNOSIS — Z51.89 ENCOUNTER FOR OTHER SPECIFIED AFTERCARE: ICD-10-CM

## 2022-11-29 NOTE — HISTORY OF PRESENT ILLNESS
[T: ___] : T[unfilled] [N: ___] : N[unfilled] [M: ___] : M[unfilled] [AJCC Stage: ____] : AJCC Stage: [unfilled] [3 - Distress Level] : Distress Level: 3 [de-identified] : Ms.Linda Salazar is a 72 year old female here for an evaluation of breast cancer. Her oncologic history is as follows:\par \par She underwent routine breast imaging on 4/27/22 (BIRADS 0) which showed a right breast mass calcifications for which additional imaging is rec. Additional right breast imaging \par on 5/27/22 ( BIRADS 5) showed a persistent mass with spiculation and calcifications in the posterior lower slightly outer right breast on mammo which likely corresponds to a newly sonographically seen highly suspicious irregular hypoechoic mass at 6:00 on sono for which US biopsy is rec. \par  \par She underwent  right breast 6:00, 8 cmfn lesion core biopsy on 6/9/2022 which showed Invasive poorly differentiated ductal carcinoma, (invasive mammary carcinoma,NST), [place] score 8/9, measuring 1.3 cm, ER+ 95%, PgR+ 25%,HER-2 +. Ductal carcinoma in situ, solid pattern with high grade,atypia, comedo necrosis and microcalcifications present. No lymphovascular permeation seen.\par \par She underwent a breast MRI on 6/16/22 (BIRADS 6) which showed 3.0 cm biopsy proven malignancy at the 6:00 position of the right breast with adjacent small satellite nodules. There was no MRI evidence of multicentric or contralateral disease.\par \par She underwent right breast lumpectomy on 6/28/22 which revealed Invasive poorly differentiated ductal carcinoma, [place] grad 3, measuring 22 mm,.Margin were negative, Lymphovascular invasion was not seen. High grade, cribriform, solid type DCIS, was noted.Two sentinel lymph node were removed and  one was positive for metastasis measuring 0.7 cm..\par \par PCP retired, wants a new one\par [facility] \par \par Started  TCHP+ onpro #1 on 8/12/22 Counts good\par Chemo expected and unexpected side effects discussed, including but not limited to hair loss, fatigue, change in taste, mouth sores, nausea, vomiting, diarrhea, infusion reaction, allergic reaction, significant myelosuppression, need for blood transfusion, infection, bleeding, cardiac toxicity, neuropathy, kidney injury, nerve pain, muscle pain and detrimental effect on liver and heart function. .\par Medication for symptom management reviewed and questions answered\par 7/26/22 ECHO LVEF 58.2%\par 07/20/2022 PET CT CAP:NICHOLAS\par EKG done , Chemo consent obtained \par 8/11/22 Left side Mediport in place, catheter tip at the distal SVC\par Patient is a anxious about stating chemo today\par Emotional support given\par \par She is s/p TCHP+ onpro on 8/12/22, she is here today 8/19/22 for IV fluids. \par She reports mild to moderate fatigue and altered taste x 3 days. She was able to function, maintained PO intake, weight stable. She had mild nausea, took Reglan, no vomiting. SHe had diarrhea, used imodium. No mouth sores. No Bone pain, no neuropathy, no fevers. No cardio pulm sx. She has an appointment and will ne f/b cardio onc  Dr Baron due to her cardiac history..\par \par S/p C2D8 today 9/9/22\par She reports mild-moderate fatigue and altered taste x 3-4 days after chemo. She was able to function, maintained PO intake, weight stable. She had mild nausea, took Reglan, no vomiting, or mouth sores. Mild diarrhea, controlled with imodium. No Bone pain, no neuropathy, no fevers [de-identified] : Ms. ELLA PIÑA is a 72 year old female here for f/u appointment for ER/ [place]/ HER-2/eladio positive right breast cancer dx 6/2022.  S/p lumpectomy. started TCHP on 8/12/22\par \par C6 day 1 today 11/25/22\par \par She reports moderate fatigue and altered taste x 3-4 days after chemo. She was able to function, maintained PO intake, weight stable. She had mild nausea, took Reglan, no vomiting, or mouth sores. Mild diarrhea, controlled with imodium. She has mild Bone pain, no neuropathy, no fevers. No cardio pulm sx\par Breast rash has improved with antifungal cream\par No more syncopal epsiode \par echo 7/2022, 11/2022: stable, follows with Dr Yrn De La Cruz \par port ok\par Continuation of HP and RT d/w her today\par Will start AI after RT

## 2022-11-29 NOTE — PHYSICAL EXAM
[Restricted in physically strenuous activity but ambulatory and able to carry out work of a light or sedentary nature] : Status 1- Restricted in physically strenuous activity but ambulatory and able to carry out work of a light or sedentary nature, e.g., light house work, office work [Normal] : affect appropriate [de-identified] : Left sided med port in place CDI [de-identified] :  Well healed Right breast lumpectomy scar noted, Left breast large erythematous circular macular rash noted under left breast and under left axilla

## 2022-11-29 NOTE — ASSESSMENT
[FreeTextEntry1] : Ms. ELLA PIÑA is a 72 year old female with stage IB  ( pT2, N1a,Mx ) ER/ [place]/ HER-2/eladio positive Invasive poorly differentiated ductal carcinoma of the right breast. She is s/p lumpectomy on 6/28/22. Adjuvant TCyHP started 8/2022\par \par \par - Breast ca: Ms. ELLA PIÑA is on TCHP +onpro chemo.\par C6d1 11/25/22\par  No neuropathy, mild arthralgia, no LFT abnormalities or cardiopulm sx. \par She has mild diarrhea but no electrolyte imbalance from chemo.\par  Check LFT and lytes today. \par Patient is on cytotoxic chemotherapy and needs intensive monitoring for severe toxicity.\par  CBC reviewed with the patient today to make sure she is not neutropenic from high risk cancer therapy drug.  We will continue to monitor CBC every 2 to 4 weeks for intense drug monitoring.\par -- She is also a candidate for radiation and endocrine therapy. Continuation of HP and RT d/w her today\par Will start AI after RT\par - At risk for chemo induced cardiomyopathy: Check ECHO Q3m. \par She has cardiac history and therefore is f/b Dr Baron\par - Chemo induced anemia-  GD1 . No transfusion needed. Monitor counts\par - Mild leukocytosis secondary to ONPRO. No neutropenia or thrombocytopenia. \par - Mild mucositis-   miracle mouth wash or Carafate prn \par - Chemo induced diarrhea- Continue Imodium prn. IV fluids if sx worsen\par - Chemo induced N/V- Will get premedications with Emend, dexamethasone and Aloxi. she will continue dexamethasone for next 3 days for antinausea prophylaxis. She will use Reglan as needed. IV fluid on D8.\par - GF induced bone pain- take Claritinx 5-7 days. \par - Chemo induced dysgeusia and fatigue: Encourage p.o. fluids, small frequent meals. 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 Patient verbalized understanding and agreement.\par \par CHEMO, IV fluids and BW ordered in [facility]\par \par RTO q 3 w for tx D1 and D8 for MD/NP\par

## 2022-12-01 NOTE — PATIENT PROFILE ADULT - FALL HARM RISK - FALLEN IN PAST
Procedure:  COLONOSCOPY    Relevant Problems   No relevant active problems        Physical Exam    Airway    Mallampati score: II  TM Distance: >3 FB  Neck ROM: full     Dental   No notable dental hx     Cardiovascular      Pulmonary      Other Findings        Anesthesia Plan  ASA Score- 1     Anesthesia Type- IV sedation with anesthesia with ASA Monitors  Additional Monitors:   Airway Plan:           Plan Factors-Exercise tolerance (METS): >4 METS  Chart reviewed  Patient summary reviewed  Patient is not a current smoker  Induction- intravenous  Postoperative Plan-     Informed Consent- Anesthetic plan and risks discussed with patient  I personally reviewed this patient with the CRNA  Discussed and agreed on the Anesthesia Plan with the CRNA  Sukhjinder Mcdaniel 0 No

## 2022-12-02 ENCOUNTER — RESULT REVIEW (OUTPATIENT)
Age: 72
End: 2022-12-02

## 2022-12-02 ENCOUNTER — APPOINTMENT (OUTPATIENT)
Dept: HEMATOLOGY ONCOLOGY | Facility: CLINIC | Age: 72
End: 2022-12-02

## 2022-12-02 ENCOUNTER — NON-APPOINTMENT (OUTPATIENT)
Age: 72
End: 2022-12-02

## 2022-12-02 ENCOUNTER — APPOINTMENT (OUTPATIENT)
Dept: INFUSION THERAPY | Facility: HOSPITAL | Age: 72
End: 2022-12-02

## 2022-12-02 VITALS
HEART RATE: 90 BPM | BODY MASS INDEX: 28.91 KG/M2 | SYSTOLIC BLOOD PRESSURE: 101 MMHG | TEMPERATURE: 97.3 F | DIASTOLIC BLOOD PRESSURE: 63 MMHG | RESPIRATION RATE: 16 BRPM | WEIGHT: 147.27 LBS | HEIGHT: 60 IN | OXYGEN SATURATION: 98 %

## 2022-12-02 DIAGNOSIS — K52.1 TOXIC GASTROENTERITIS AND COLITIS: ICD-10-CM

## 2022-12-02 DIAGNOSIS — T45.1X5A TOXIC GASTROENTERITIS AND COLITIS: ICD-10-CM

## 2022-12-02 LAB
ALBUMIN SERPL ELPH-MCNC: 3.9 G/DL — SIGNIFICANT CHANGE UP (ref 3.3–5)
ALP SERPL-CCNC: 86 U/L — SIGNIFICANT CHANGE UP (ref 40–120)
ALT FLD-CCNC: 9 U/L — LOW (ref 10–45)
ANION GAP SERPL CALC-SCNC: 15 MMOL/L — SIGNIFICANT CHANGE UP (ref 5–17)
AST SERPL-CCNC: 13 U/L — SIGNIFICANT CHANGE UP (ref 10–40)
BASOPHILS # BLD AUTO: 0 K/UL — SIGNIFICANT CHANGE UP (ref 0–0.2)
BASOPHILS NFR BLD AUTO: 0 % — SIGNIFICANT CHANGE UP (ref 0–2)
BILIRUB SERPL-MCNC: 0.4 MG/DL — SIGNIFICANT CHANGE UP (ref 0.2–1.2)
BUN SERPL-MCNC: 17 MG/DL — SIGNIFICANT CHANGE UP (ref 7–23)
CALCIUM SERPL-MCNC: 8.9 MG/DL — SIGNIFICANT CHANGE UP (ref 8.4–10.5)
CHLORIDE SERPL-SCNC: 105 MMOL/L — SIGNIFICANT CHANGE UP (ref 96–108)
CO2 SERPL-SCNC: 18 MMOL/L — LOW (ref 22–31)
CREAT SERPL-MCNC: 1.01 MG/DL — SIGNIFICANT CHANGE UP (ref 0.5–1.3)
EGFR: 58 ML/MIN/1.73M2 — LOW
EOSINOPHIL # BLD AUTO: 0.09 K/UL — SIGNIFICANT CHANGE UP (ref 0–0.5)
EOSINOPHIL NFR BLD AUTO: 2 % — SIGNIFICANT CHANGE UP (ref 0–6)
GLUCOSE SERPL-MCNC: 110 MG/DL — HIGH (ref 70–99)
HCT VFR BLD CALC: 31.9 % — LOW (ref 34.5–45)
HGB BLD-MCNC: 10.2 G/DL — LOW (ref 11.5–15.5)
LYMPHOCYTES # BLD AUTO: 2.07 K/UL — SIGNIFICANT CHANGE UP (ref 1–3.3)
LYMPHOCYTES # BLD AUTO: 45 % — HIGH (ref 13–44)
MACROCYTES BLD QL: SLIGHT — SIGNIFICANT CHANGE UP
MAGNESIUM SERPL-MCNC: 1.9 MG/DL — SIGNIFICANT CHANGE UP (ref 1.6–2.6)
MCHC RBC-ENTMCNC: 32 G/DL — SIGNIFICANT CHANGE UP (ref 32–36)
MCHC RBC-ENTMCNC: 32.9 PG — SIGNIFICANT CHANGE UP (ref 27–34)
MCV RBC AUTO: 102.9 FL — HIGH (ref 80–100)
METAMYELOCYTES # FLD: 2 % — HIGH (ref 0–0)
MONOCYTES # BLD AUTO: 1.1 K/UL — HIGH (ref 0–0.9)
MONOCYTES NFR BLD AUTO: 24 % — HIGH (ref 2–14)
NEUTROPHILS # BLD AUTO: 1.24 K/UL — LOW (ref 1.8–7.4)
NEUTROPHILS NFR BLD AUTO: 25 % — LOW (ref 43–77)
NEUTS BAND # BLD: 2 % — SIGNIFICANT CHANGE UP (ref 0–8)
NRBC # BLD: 0 /100 — SIGNIFICANT CHANGE UP (ref 0–0)
NRBC # BLD: SIGNIFICANT CHANGE UP /100 WBCS (ref 0–0)
PLAT MORPH BLD: NORMAL — SIGNIFICANT CHANGE UP
PLATELET # BLD AUTO: 156 K/UL — SIGNIFICANT CHANGE UP (ref 150–400)
POTASSIUM SERPL-MCNC: 4.6 MMOL/L — SIGNIFICANT CHANGE UP (ref 3.5–5.3)
POTASSIUM SERPL-SCNC: 4.6 MMOL/L — SIGNIFICANT CHANGE UP (ref 3.5–5.3)
PROT SERPL-MCNC: 6.4 G/DL — SIGNIFICANT CHANGE UP (ref 6–8.3)
RBC # BLD: 3.1 M/UL — LOW (ref 3.8–5.2)
RBC # FLD: 16.7 % — HIGH (ref 10.3–14.5)
RBC BLD AUTO: ABNORMAL
SODIUM SERPL-SCNC: 138 MMOL/L — SIGNIFICANT CHANGE UP (ref 135–145)
WBC # BLD: 4.6 K/UL — SIGNIFICANT CHANGE UP (ref 3.8–10.5)
WBC # FLD AUTO: 4.6 K/UL — SIGNIFICANT CHANGE UP (ref 3.8–10.5)

## 2022-12-02 PROCEDURE — 99214 OFFICE O/P EST MOD 30 MIN: CPT

## 2022-12-02 RX ORDER — HYDROCORTISONE 10 MG/G
1 OINTMENT TOPICAL TWICE DAILY
Qty: 1 | Refills: 0 | Status: ACTIVE | COMMUNITY
Start: 2022-12-02 | End: 1900-01-01

## 2022-12-02 NOTE — HISTORY OF PRESENT ILLNESS
[T: ___] : T[unfilled] [N: ___] : N[unfilled] [M: ___] : M[unfilled] [AJCC Stage: ____] : AJCC Stage: [unfilled] [de-identified] : Ms.Linda Salazar is a 72 year old female here for an evaluation of breast cancer. Her oncologic history is as follows:\par \par She underwent routine breast imaging on 4/27/22 (BIRADS 0) which showed a right breast mass calcifications for which additional imaging is rec. Additional right breast imaging \par on 5/27/22 ( BIRADS 5) showed a persistent mass with spiculation and calcifications in the posterior lower slightly outer right breast on mammo which likely corresponds to a newly sonographically seen highly suspicious irregular hypoechoic mass at 6:00 on sono for which US biopsy is rec. \par  \par She underwent  right breast 6:00, 8 cmfn lesion core biopsy on 6/9/2022 which showed Invasive poorly differentiated ductal carcinoma, (invasive mammary carcinoma,NST), [place] score 8/9, measuring 1.3 cm, ER+ 95%, PgR+ 25%,HER-2 +. Ductal carcinoma in situ, solid pattern with high grade,atypia, comedo necrosis and microcalcifications present. No lymphovascular permeation seen.\par \par She underwent a breast MRI on 6/16/22 (BIRADS 6) which showed 3.0 cm biopsy proven malignancy at the 6:00 position of the right breast with adjacent small satellite nodules. There was no MRI evidence of multicentric or contralateral disease.\par \par She underwent right breast lumpectomy on 6/28/22 which revealed Invasive poorly differentiated ductal carcinoma, [place] grad 3, measuring 22 mm,.Margin were negative, Lymphovascular invasion was not seen. High grade, cribriform, solid type DCIS, was noted.Two sentinel lymph node were removed and  one was positive for metastasis measuring 0.7 cm..\par \par PCP retired, wants a new one\par [facility],will establish cardio \par \par Started  TCHP+ onpro #1 on 8/12/22 Counts good\par Chemo expected and unexpected side effects discussed, including but not limited to hair loss, fatigue, change in taste, mouth sores, nausea, vomiting, diarrhea, infusion reaction, allergic reaction, significant myelosuppression, need for blood transfusion, infection, bleeding, cardiac toxicity, neuropathy, kidney injury, nerve pain, muscle pain and detrimental effect on liver and heart function. .\par Medication for symptom management reviewed and questions answered\par 7/26/22 ECHO LVEF 58.2%\par 07/20/2022 PET CT CAP:NICHOLAS\par EKG done , Chemo consent obtained \par 8/11/22 Left side Mediport in place, catheter tip at the distal SVC\par Patient is a anxious about stating chemo today\par Emotional support given\par \par She is s/p TCHP+ onpro on 8/12/22, she is here today 8/19/22 for IV fluids. \par She reports mild to moderate fatigue and altered taste x 3 days. She was able to function, maintained PO intake, weight stable. She had mild nausea, took Reglan, no vomiting. SHe had diarrhea, used imodium. No mouth sores. No Bone pain, no neuropathy, no fevers. No cardio pulm sx. She has an appointment and will ne f/b cardio onc  Dr Baron due to her cardiac history..\par \par S/p C2D8 today 9/9/22\par She reports mild-moderate fatigue and altered taste x 3-4 days after chemo. She was able to function, maintained PO intake, weight stable. She had mild nausea, took Reglan, no vomiting, or mouth sores. Mild diarrhea, controlled with imodium. No Bone pain, no neuropathy, no fevers [de-identified] : Ms. ELLA PIÑA is a 72 year old female here for f/u appointment for ER/ [place]/ HER-2/eladio positive right breast cancer dx 6/2022.  S/p lumpectomy. started TCHP on 8/12/22\par \par C6 day 8 today 12/2/22\par She reports moderate fatigue and altered taste x 3-4 days after chemo. She was able to function, maintained PO intake, weight stable. She had mild nausea, took Reglan, no vomiting, or mouth sores. Mild diarrhea, controlled with imodium. She has mild Bone pain, no neuropathy, no fevers. No cardio pulm sx\par Breast rash has improved with antifungal cream\par No more syncopal epsiode \par echo 7/2022, 11/2022: stable, follows with Dr Yrn De La Cruz \par port ok\par Continuation of HP and RT d/w her today\par Will start AI after RT [3 - Distress Level] : Distress Level: 3

## 2022-12-02 NOTE — PHYSICAL EXAM
[Restricted in physically strenuous activity but ambulatory and able to carry out work of a light or sedentary nature] : Status 1- Restricted in physically strenuous activity but ambulatory and able to carry out work of a light or sedentary nature, e.g., light house work, office work [Normal] : affect appropriate [de-identified] : Left sided med port in place CDI [de-identified] :  Well healed Right breast lumpectomy scar noted, Left breast large erythematous circular macular rash noted under left breast and under left axilla

## 2022-12-02 NOTE — ASSESSMENT
[FreeTextEntry1] : Ms. 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 She is s/p lumpectomy on 6/28/22. Adjuvant TCyHP started 8/2022\par \par \par - Breast ca: Ms. ELLA PIÑA is on TCHP +onpro chemo.\par C6d8 12/2/22\par  No neuropathy, mild arthralgia, no LFT abnormalities or cardiopulm sx. \par She has mild diarrhea but no electrolyte imbalance from chemo.\par  Check LFT and lytes today. \par Patient is on cytotoxic chemotherapy and needs intensive monitoring for severe toxicity.\par  CBC reviewed with the patient today to make sure she is not neutropenic from high risk cancer therapy drug.  We will continue to monitor CBC every 2 to 4 weeks for intense drug monitoring.\par -- She is also a candidate for radiation and endocrine therapy. Continuation of HP and RT d/w her today\par Will start AI after RT\par - At risk for chemo induced cardiomyopathy: Check ECHO Q3m. \par She has cardiac history and therefore is f/b Dr Baron\par - Chemo induced anemia-  GD1 . No transfusion needed. Monitor counts\par - Mild leukocytosis secondary to ONPRO. No neutropenia or thrombocytopenia. \par - Mild mucositis-   miracle mouth wash or Carafate prn \par - Chemo induced diarrhea- Continue Imodium prn. IV fluids if sx worsen\par - Chemo induced N/V- Will get premedications with Emend, dexamethasone and Aloxi. she will continue dexamethasone for next 3 days for antinausea prophylaxis. She will use Reglan as needed. IV fluid on D8.\par - GF induced bone pain- take Claritinx 5-7 days. \par - Chemo induced dysgeusia and fatigue: Encourage p.o. fluids, small frequent meals. 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 Patient verbalized understanding and agreement.\par \par CHEMO, IV fluids and BW ordered in [facility]\par \par RTO q 3 w for HP\par f/u with me after RT \par

## 2022-12-05 DIAGNOSIS — E86.0 DEHYDRATION: ICD-10-CM

## 2022-12-12 ENCOUNTER — APPOINTMENT (OUTPATIENT)
Dept: RADIATION ONCOLOGY | Facility: CLINIC | Age: 72
End: 2022-12-12

## 2022-12-12 ENCOUNTER — OUTPATIENT (OUTPATIENT)
Dept: OUTPATIENT SERVICES | Facility: HOSPITAL | Age: 72
LOS: 1 days | Discharge: HOME | End: 2022-12-12
Payer: MEDICARE

## 2022-12-12 VITALS
OXYGEN SATURATION: 97 % | TEMPERATURE: 97.7 F | RESPIRATION RATE: 17 BRPM | BODY MASS INDEX: 29.04 KG/M2 | HEART RATE: 103 BPM | DIASTOLIC BLOOD PRESSURE: 82 MMHG | HEIGHT: 60 IN | WEIGHT: 147.93 LBS | SYSTOLIC BLOOD PRESSURE: 117 MMHG

## 2022-12-12 DIAGNOSIS — Z96.641 PRESENCE OF RIGHT ARTIFICIAL HIP JOINT: Chronic | ICD-10-CM

## 2022-12-12 DIAGNOSIS — Z90.89 ACQUIRED ABSENCE OF OTHER ORGANS: Chronic | ICD-10-CM

## 2022-12-12 DIAGNOSIS — Z98.890 OTHER SPECIFIED POSTPROCEDURAL STATES: Chronic | ICD-10-CM

## 2022-12-12 PROCEDURE — 77263 THER RADIOLOGY TX PLNG CPLX: CPT

## 2022-12-12 PROCEDURE — 99204 OFFICE O/P NEW MOD 45 MIN: CPT | Mod: 25

## 2022-12-14 ENCOUNTER — NON-APPOINTMENT (OUTPATIENT)
Age: 72
End: 2022-12-14

## 2022-12-14 PROCEDURE — 77333 RADIATION TREATMENT AID(S): CPT | Mod: 26

## 2022-12-14 PROCEDURE — 77290 THER RAD SIMULAJ FIELD CPLX: CPT | Mod: 26

## 2022-12-14 NOTE — LETTER CLOSING
[Consult Closing:] : Thank you for allowing me to participate in the care of this patient.  If you have any questions, please do not hesitate to contact me. [Sincerely yours,] : Sincerely yours, [FreeTextEntry3] : Nicki Yoder MD\par

## 2022-12-14 NOTE — PHYSICAL EXAM
[Sclera] : the sclera and conjunctiva were normal [] : no rash [No Focal Deficits] : no focal deficits [Normal] : well developed, well nourished, in no acute distress [Heart Rate And Rhythm] : heart rate and rhythm were normal [Abdomen Soft] : soft [Nondistended] : nondistended [Supraclavicular Lymph Nodes Enlarged Bilaterally] : supraclavicular [Axillary Lymph Nodes Enlarged Bilaterally] : axillary [de-identified] : Right lumpectomy and axillary scars are well healed, no erythema; patchy hyperpigmentation in left inframmary fold

## 2022-12-14 NOTE — HISTORY OF PRESENT ILLNESS
[FreeTextEntry1] : Ms. Salazar is a 72 year old female with stage IB triple positive invasive ductal carcinoma of the right breast, who is s/p lumpectomy with lymph node metastases, followed by adjuvant chemotherapy. She presents for radiotherapy recommendations.\par \par She underwent routine screening mammography on 4/27/22. A new irregular mass with associated calcifications was seen in the lower central posterior right breast. Her prior imaging was from 2018. \par \par Diagnostic right mammography on 5/27/22 showed a persistent mass with spiculation and calcifications in the posterior lower slightly outer right breast measuring 1.6 x 1.2 cm. Targeted right breast ultrasound showed an irregular solid appearing hypoechoic mass measuring 1.7 x 1.7 x 1.4 cm in the right breast at 6:00, 8 cm FN, corresponding to the mammographic finding. There was no right axillary adenopathy. \par  \par She underwent ultrasound guided core biopsy on 6/9/2022 with pathology showing invasive poorly differentiated ductal carcinoma, [place] score 8/9, measuring at least 1.3 cm. Ductal carcinoma in situ, solid pattern with high grade atypia, comedo necrosis and microcalcifications was also present. There was no lymphvascular invasion. IHC showed ER 95%, CT 25%, and HER2 3+. \par \par Breast MRI on 6/16/22 showed an irregular mass measuring 3.0 x 2.0 x 2.0 cm in the right breast at 6:00, posterior depth. Two 5 mm satellite nodules adjacent to the index lesion were noted. There was no evidence of multicentric or contralateral disease. There was no axillary or internal mammary lymphadenopathy. \par \par She underwent right breast lumpectomy and sentinel lymph node biopsy on 6/28/22. Pathology showed invasive poorly differentiated ductal carcinoma, [place] grade 3, measuring 22 mm, associated with a chronic inflammatory infiltrate. DCIS was present, with solid and cribriform patterns, high nuclear grade atypia and necrosis. Biopsy site changes were present. Additional shaved margins contained fibrocystic change and no carcinoma. Right sentinel lymph node biopsy showed metastatic carcinoma in 1 out of 2 lymph nodes, measuring 7 mm and without extranodal extension. \par \par She was started on TCHP chemotherapy on 8/12/22 and completed treatment, now on maintenance HP.  The side effects of chemotherapy are gradually improving.  She still finds energy is low and appetite is down but improving. She had a recent dermatitis in the left inframammary fold and bilateral groins, which is now resolved. She is happy to be driving again.

## 2022-12-16 ENCOUNTER — APPOINTMENT (OUTPATIENT)
Dept: INFUSION THERAPY | Facility: HOSPITAL | Age: 72
End: 2022-12-16

## 2022-12-19 ENCOUNTER — APPOINTMENT (OUTPATIENT)
Dept: CV DIAGNOSITCS | Facility: HOSPITAL | Age: 72
End: 2022-12-19

## 2022-12-19 ENCOUNTER — OUTPATIENT (OUTPATIENT)
Dept: OUTPATIENT SERVICES | Facility: HOSPITAL | Age: 72
LOS: 1 days | End: 2022-12-19

## 2022-12-19 DIAGNOSIS — Z90.89 ACQUIRED ABSENCE OF OTHER ORGANS: Chronic | ICD-10-CM

## 2022-12-19 DIAGNOSIS — Z98.890 OTHER SPECIFIED POSTPROCEDURAL STATES: Chronic | ICD-10-CM

## 2022-12-19 DIAGNOSIS — M79.89 OTHER SPECIFIED SOFT TISSUE DISORDERS: ICD-10-CM

## 2022-12-19 DIAGNOSIS — Z96.641 PRESENCE OF RIGHT ARTIFICIAL HIP JOINT: Chronic | ICD-10-CM

## 2022-12-19 PROCEDURE — 93970 EXTREMITY STUDY: CPT | Mod: 26

## 2022-12-20 PROCEDURE — 77300 RADIATION THERAPY DOSE PLAN: CPT | Mod: 26

## 2022-12-20 PROCEDURE — 77334 RADIATION TREATMENT AID(S): CPT | Mod: 26

## 2022-12-20 PROCEDURE — 77295 3-D RADIOTHERAPY PLAN: CPT | Mod: 26

## 2022-12-21 PROCEDURE — 77334 RADIATION TREATMENT AID(S): CPT | Mod: 26

## 2022-12-21 PROCEDURE — 77321 SPECIAL TELETX PORT PLAN: CPT | Mod: 26

## 2022-12-28 PROCEDURE — 77280 THER RAD SIMULAJ FIELD SMPL: CPT | Mod: 26

## 2023-01-03 PROCEDURE — 77427 RADIATION TX MANAGEMENT X5: CPT

## 2023-01-04 ENCOUNTER — NON-APPOINTMENT (OUTPATIENT)
Age: 73
End: 2023-01-04

## 2023-01-05 PROCEDURE — 77427 RADIATION TX MANAGEMENT X5: CPT

## 2023-01-06 ENCOUNTER — APPOINTMENT (OUTPATIENT)
Dept: INFUSION THERAPY | Facility: HOSPITAL | Age: 73
End: 2023-01-06

## 2023-01-06 ENCOUNTER — RESULT REVIEW (OUTPATIENT)
Age: 73
End: 2023-01-06

## 2023-01-06 ENCOUNTER — APPOINTMENT (OUTPATIENT)
Dept: HEMATOLOGY ONCOLOGY | Facility: CLINIC | Age: 73
End: 2023-01-06
Payer: MEDICARE

## 2023-01-06 VITALS
DIASTOLIC BLOOD PRESSURE: 65 MMHG | OXYGEN SATURATION: 97 % | RESPIRATION RATE: 18 BRPM | SYSTOLIC BLOOD PRESSURE: 129 MMHG | TEMPERATURE: 97.8 F | BODY MASS INDEX: 28.63 KG/M2 | WEIGHT: 146.61 LBS | HEART RATE: 93 BPM

## 2023-01-06 LAB
ALBUMIN SERPL ELPH-MCNC: 3.9 G/DL — SIGNIFICANT CHANGE UP (ref 3.3–5)
ALP SERPL-CCNC: 76 U/L — SIGNIFICANT CHANGE UP (ref 40–120)
ALT FLD-CCNC: 7 U/L — LOW (ref 10–45)
ANION GAP SERPL CALC-SCNC: 16 MMOL/L — SIGNIFICANT CHANGE UP (ref 5–17)
AST SERPL-CCNC: 16 U/L — SIGNIFICANT CHANGE UP (ref 10–40)
BASOPHILS # BLD AUTO: 0.02 K/UL — SIGNIFICANT CHANGE UP (ref 0–0.2)
BASOPHILS NFR BLD AUTO: 0.5 % — SIGNIFICANT CHANGE UP (ref 0–2)
BILIRUB SERPL-MCNC: 0.4 MG/DL — SIGNIFICANT CHANGE UP (ref 0.2–1.2)
BUN SERPL-MCNC: 14 MG/DL — SIGNIFICANT CHANGE UP (ref 7–23)
CALCIUM SERPL-MCNC: 9.4 MG/DL — SIGNIFICANT CHANGE UP (ref 8.4–10.5)
CHLORIDE SERPL-SCNC: 103 MMOL/L — SIGNIFICANT CHANGE UP (ref 96–108)
CO2 SERPL-SCNC: 20 MMOL/L — LOW (ref 22–31)
CREAT SERPL-MCNC: 0.65 MG/DL — SIGNIFICANT CHANGE UP (ref 0.5–1.3)
EGFR: 92 ML/MIN/1.73M2 — SIGNIFICANT CHANGE UP
EOSINOPHIL # BLD AUTO: 0.08 K/UL — SIGNIFICANT CHANGE UP (ref 0–0.5)
EOSINOPHIL NFR BLD AUTO: 2.1 % — SIGNIFICANT CHANGE UP (ref 0–6)
GLUCOSE SERPL-MCNC: 125 MG/DL — HIGH (ref 70–99)
HCT VFR BLD CALC: 33.9 % — LOW (ref 34.5–45)
HGB BLD-MCNC: 11 G/DL — LOW (ref 11.5–15.5)
IMM GRANULOCYTES NFR BLD AUTO: 0.3 % — SIGNIFICANT CHANGE UP (ref 0–0.9)
LYMPHOCYTES # BLD AUTO: 1.11 K/UL — SIGNIFICANT CHANGE UP (ref 1–3.3)
LYMPHOCYTES # BLD AUTO: 28.8 % — SIGNIFICANT CHANGE UP (ref 13–44)
MAGNESIUM SERPL-MCNC: 1.8 MG/DL — SIGNIFICANT CHANGE UP (ref 1.6–2.6)
MCHC RBC-ENTMCNC: 32.2 PG — SIGNIFICANT CHANGE UP (ref 27–34)
MCHC RBC-ENTMCNC: 32.4 G/DL — SIGNIFICANT CHANGE UP (ref 32–36)
MCV RBC AUTO: 99.1 FL — SIGNIFICANT CHANGE UP (ref 80–100)
MONOCYTES # BLD AUTO: 0.31 K/UL — SIGNIFICANT CHANGE UP (ref 0–0.9)
MONOCYTES NFR BLD AUTO: 8 % — SIGNIFICANT CHANGE UP (ref 2–14)
NEUTROPHILS # BLD AUTO: 2.33 K/UL — SIGNIFICANT CHANGE UP (ref 1.8–7.4)
NEUTROPHILS NFR BLD AUTO: 60.3 % — SIGNIFICANT CHANGE UP (ref 43–77)
NRBC # BLD: 0 /100 WBCS — SIGNIFICANT CHANGE UP (ref 0–0)
PLATELET # BLD AUTO: 196 K/UL — SIGNIFICANT CHANGE UP (ref 150–400)
POTASSIUM SERPL-MCNC: 3.9 MMOL/L — SIGNIFICANT CHANGE UP (ref 3.5–5.3)
POTASSIUM SERPL-SCNC: 3.9 MMOL/L — SIGNIFICANT CHANGE UP (ref 3.5–5.3)
PROT SERPL-MCNC: 6.6 G/DL — SIGNIFICANT CHANGE UP (ref 6–8.3)
RBC # BLD: 3.42 M/UL — LOW (ref 3.8–5.2)
RBC # FLD: 14.6 % — HIGH (ref 10.3–14.5)
SODIUM SERPL-SCNC: 139 MMOL/L — SIGNIFICANT CHANGE UP (ref 135–145)
WBC # BLD: 3.86 K/UL — SIGNIFICANT CHANGE UP (ref 3.8–10.5)
WBC # FLD AUTO: 3.86 K/UL — SIGNIFICANT CHANGE UP (ref 3.8–10.5)

## 2023-01-06 PROCEDURE — 99214 OFFICE O/P EST MOD 30 MIN: CPT

## 2023-01-06 NOTE — DISEASE MANAGEMENT
[Pathological] : TNM Stage: p [IB] : IB [TTNM] : 2 [NTNM] : 1a [MTNM] : 0 [de-identified] : 1060 cGy [de-identified] : 5240 cGy [de-identified] : Right breast

## 2023-01-06 NOTE — PHYSICAL EXAM
[Normal] : well developed, well nourished, in no acute distress [de-identified] : Right lumpectomy and axillary scars are well healed, no erythema, no hyperpigmentation.  [de-identified] : b/l LE edema, Rt >Lt [de-identified] : excoriated papular rash right forearm > L

## 2023-01-06 NOTE — HISTORY OF PRESENT ILLNESS
[FreeTextEntry1] : Ms. Salazar is a 72 year old female with prognostic stage IB T2N1a(sn)M0 triple positive invasive ductal carcinoma of the right breast. She underwent lumpectomy with negative margins and had a positive sentinel lymph node biopsy. She recently completed adjuvant chemotherapy and will continue HP every 3 weeks. She is currently receiving adjuvant radiation therapy. \par \par She is feeling generally well. She denies fatigue and pain. She has not noted any skin reactions on the breast. However, she has had a rash on her arms, past 2 weeks, itchy and painful. She has had it in the past, sometimes it involves her legs but not currently. She has an appointment with Dermatology. She is using Aquaphor on the skin.

## 2023-01-08 NOTE — PHYSICAL EXAM
[Restricted in physically strenuous activity but ambulatory and able to carry out work of a light or sedentary nature] : Status 1- Restricted in physically strenuous activity but ambulatory and able to carry out work of a light or sedentary nature, e.g., light house work, office work [Normal] : affect appropriate [de-identified] : Left sided med port in place CDI [de-identified] :  Well healed Right breast lumpectomy scar noted, Left breast large erythematous circular macular rash noted under left breast and under left axilla

## 2023-01-08 NOTE — HISTORY OF PRESENT ILLNESS
[T: ___] : T[unfilled] [N: ___] : N[unfilled] [M: ___] : M[unfilled] [AJCC Stage: ____] : AJCC Stage: [unfilled] [3 - Distress Level] : Distress Level: 3 [de-identified] : Ms. ELLA PIÑA is a 72 year old female here for f/u appointment for ER/ [place]/ HER-2/eladio positive right breast cancer dx 6/2022.  S/p lumpectomy. s/p adjuvant TCyHP on 8/12/22- 11/2022\par \par 1/2023: \par Last TCHP 11/25/22\par chemo s/e are slowly improving\par Started RT \par Currently on HP q3w . Denies CP sob\par Has LE swelling, s/p doppler - neg for DVT\par She reports rash on BL arms, started 2 weeks ago. Reports she had similar rash in the past ( pre chemo). Seeing derm. Using steroid cream as spot treatment\par ECHo 11/2022\par start AI after RT  [de-identified] : Ms.Linda Salazar is a 72 year old female here for an evaluation of breast cancer. Her oncologic history is as follows:\par \par She underwent routine breast imaging on 4/27/22 (BIRADS 0) which showed a right breast mass calcifications for which additional imaging is rec. Additional right breast imaging \par on 5/27/22 ( BIRADS 5) showed a persistent mass with spiculation and calcifications in the posterior lower slightly outer right breast on mammo which likely corresponds to a newly sonographically seen highly suspicious irregular hypoechoic mass at 6:00 on sono for which US biopsy is rec. \par  \par She underwent  right breast 6:00, 8 cmfn lesion core biopsy on 6/9/2022 which showed Invasive poorly differentiated ductal carcinoma, (invasive mammary carcinoma,NST), [place] score 8/9, measuring 1.3 cm, ER+ 95%, PgR+ 25%,HER-2 +. Ductal carcinoma in situ, solid pattern with high grade,atypia, comedo necrosis and microcalcifications present. No lymphovascular permeation seen.\par \par She underwent a breast MRI on 6/16/22 (BIRADS 6) which showed 3.0 cm biopsy proven malignancy at the 6:00 position of the right breast with adjacent small satellite nodules. There was no MRI evidence of multicentric or contralateral disease.\par \par She underwent right breast lumpectomy on 6/28/22 which revealed Invasive poorly differentiated ductal carcinoma, [place] grad 3, measuring 22 mm,.Margin were negative, Lymphovascular invasion was not seen. High grade, cribriform, solid type DCIS, was noted.Two sentinel lymph node were removed and  one was positive for metastasis measuring 0.7 cm..\par \par PCP retired, wants a new one\par [facility],will establish cardio \par \par Started  TCHP+ onpro #1 on 8/12/22 Counts good\par Chemo expected and unexpected side effects discussed, including but not limited to hair loss, fatigue, change in taste, mouth sores, nausea, vomiting, diarrhea, infusion reaction, allergic reaction, significant myelosuppression, need for blood transfusion, infection, bleeding, cardiac toxicity, neuropathy, kidney injury, nerve pain, muscle pain and detrimental effect on liver and heart function. .\par Medication for symptom management reviewed and questions answered\par 7/26/22 ECHO LVEF 58.2%\par 07/20/2022 PET CT CAP:NICHOLAS\par EKG done , Chemo consent obtained \par 8/11/22 Left side Mediport in place, catheter tip at the distal SVC\par Patient is a anxious about stating chemo today\par Emotional support given\par \par She is s/p TCHP+ onpro on 8/12/22, she is here today 8/19/22 for IV fluids. \par She reports mild to moderate fatigue and altered taste x 3 days. She was able to function, maintained PO intake, weight stable. She had mild nausea, took Reglan, no vomiting. SHe had diarrhea, used imodium. No mouth sores. No Bone pain, no neuropathy, no fevers. No cardio pulm sx. She has an appointment and will ne f/b cardio onc  Dr Baron due to her cardiac history..\par \par S/p C2D8 today 9/9/22\par She reports mild-moderate fatigue and altered taste x 3-4 days after chemo. She was able to function, maintained PO intake, weight stable. She had mild nausea, took Reglan, no vomiting, or mouth sores. Mild diarrhea, controlled with imodium. No Bone pain, no neuropathy, no fevers\par \par Last TCHP 11/25/22\par chemo s/e are slowly improving\par Started RT \par Has LE sweeling, s/p doppler - neg for DVT\par She reports rash on BL arms, started 2 weeks ago. Reports she had similar rash in the past ( pre chemo). Seeing derm. Using steroid cream as spot treatment\par ECHo 11/2022\par start AI after RT \par \par \par C6 day 8 today 12/2/22\par She reports moderate fatigue and altered taste x 3-4 days after chemo. She was able to function, maintained PO intake, weight stable. She had mild nausea, took Reglan, no vomiting, or mouth sores. Mild diarrhea, controlled with imodium. She has mild Bone pain, no neuropathy, no fevers. No cardio pulm sx\par Breast rash has improved with antifungal cream\par No more syncopal epsiode \par echo 7/2022, 11/2022: stable, follows with Dr Yrn De La Cruz \par port ok\par Continuation of HP and RT d/w her today\par Will start AI after RT

## 2023-01-08 NOTE — ASSESSMENT
[FreeTextEntry1] : Ms. ELLA PIÑA is a 72 year old female with stage IB  ( pT2, N1a,Mx ) ER/ [place]/ HER-2/eladio positive Invasive poorly differentiated ductal carcinoma of the right breast. She is s/p lumpectomy on 6/28/22. Adjuvant TCyHP started 8/2022\par \par \par - Breast ca: Ms. ELLA PÑIA is S/p lumpectomy. s/p adjuvant TCyHP on 8/12/22- 11/2022\par \par 1/2023: \par Last TCHP 11/25/22\par chemo s/e are slowly improving\par Started RT \par Currently on HP q3w . Denies CP sob\par Has LE swelling, s/p doppler - neg for DVT\par She reports rash on BL arms, started 2 weeks ago. Reports she had similar rash in the past ( pre chemo). Seeing derm. Using steroid cream as spot treatment\par ECHo 11/2022\par start AI after RT \par \par \par - At risk for chemo induced cardiomyopathy: Check ECHO Q3m. \par She has cardiac history and therefore is f/b Dr Baron\par - Chemo induced anemia-  GD1 . No transfusion needed. Monitor counts\par - Chemo induced diarrhea- Continue Imodium prn. IV fluids if sx worsen\par - Emotional support provided, all questions answered.\par - Left side Mediport in place, 07/20/2022 PET CT CAP:NICHOLAS .\par - Instructed to call office and go directly to the emergency room with fever more than 100.4, shaking chills, productive cough, sore throat, shortness of breath or urinary symptoms. Patient verbalized understanding and agreement.\par \par CHEMO, IV fluids and BW ordered in [facility]\par \par RTO q 3 w for HP\par f/u with me after RT to start AI\par HP CHEMO ORDERED IN [facility] FOR TODAY\par

## 2023-01-11 ENCOUNTER — NON-APPOINTMENT (OUTPATIENT)
Age: 73
End: 2023-01-11

## 2023-01-11 PROCEDURE — 77280 THER RAD SIMULAJ FIELD SMPL: CPT | Mod: 26

## 2023-01-12 PROCEDURE — 77427 RADIATION TX MANAGEMENT X5: CPT

## 2023-01-13 PROCEDURE — 77427 RADIATION TX MANAGEMENT X5: CPT

## 2023-01-15 NOTE — DISEASE MANAGEMENT
[Pathological] : TNM Stage: p [IB] : IB [TTNM] : 2 [NTNM] : 1a [MTNM] : 0 [de-identified] : 5240 cGy [de-identified] : 7815 cGy [de-identified] : Right breast

## 2023-01-15 NOTE — PHYSICAL EXAM
[Normal] : well developed, well nourished, in no acute distress [de-identified] : Right lumpectomy and axillary scars are well healed, no erythema, no hyperpigmentation.

## 2023-01-15 NOTE — HISTORY OF PRESENT ILLNESS
[FreeTextEntry1] : Ms. Salazar is a 72 year old female with prognostic stage IB T2N1a(sn)M0 triple positive invasive ductal carcinoma of the right breast. She underwent lumpectomy with negative margins and had a positive sentinel lymph node biopsy. She recently completed adjuvant chemotherapy and will continue HP every 3 weeks. She is currently receiving adjuvant radiation therapy. \par \par She is feeling generally well. She denies fatigue and pain. She has not noted any skin reactions on the breast.

## 2023-01-18 ENCOUNTER — NON-APPOINTMENT (OUTPATIENT)
Age: 73
End: 2023-01-18

## 2023-01-18 ENCOUNTER — APPOINTMENT (OUTPATIENT)
Dept: SURGERY | Facility: CLINIC | Age: 73
End: 2023-01-18
Payer: MEDICARE

## 2023-01-18 PROCEDURE — 99213K: CUSTOM

## 2023-01-18 NOTE — REVIEW OF SYSTEMS
[Alopecia: Grade 0] : Alopecia: Grade 0 [Pruritus: Grade 0] : Pruritus: Grade 0 [Skin Atrophy: Grade 0] : Skin Atrophy: Grade 0 [Skin Induration: Grade 0] : Skin Induration: Grade 0 [Dermatitis Radiation: Grade 0] : Dermatitis Radiation: Grade 0 [Skin Hyperpigmentation: Grade 1 - Hyperpigmentation covering <10% BSA; no psychosocial impact] : Skin Hyperpigmentation: Grade 1 - Hyperpigmentation covering <10% BSA; no psychosocial impact

## 2023-01-19 NOTE — DISEASE MANAGEMENT
[Pathological] : TNM Stage: p [IB] : IB [TTNM] : 2 [NTNM] : 1a [MTNM] : 0 [de-identified] : 3445 cGy [de-identified] : 5240 cGy [de-identified] : Right breast

## 2023-01-19 NOTE — PHYSICAL EXAM
[Normal] : well developed, well nourished, in no acute distress [de-identified] : Right lumpectomy and axillary scars are well healed, mild erythema

## 2023-01-19 NOTE — HISTORY OF PRESENT ILLNESS
[FreeTextEntry1] : Ms. Salazar is a 72 year old female with prognostic stage IB T2N1a(sn)M0 triple positive invasive ductal carcinoma of the right breast. She underwent lumpectomy with negative margins and had a positive sentinel lymph node biopsy. She recently completed adjuvant chemotherapy and will continue HP every 3 weeks. She is currently receiving adjuvant radiation therapy. \par \par She is feeling generally well. She denies fatigue but report occasional soreness to underneath her right nipple.. She noted mild hyperpigmentation to skin on the right breast.

## 2023-01-21 ENCOUNTER — OUTPATIENT (OUTPATIENT)
Dept: OUTPATIENT SERVICES | Facility: HOSPITAL | Age: 73
LOS: 1 days | Discharge: HOME | End: 2023-01-21

## 2023-01-21 DIAGNOSIS — Z96.641 PRESENCE OF RIGHT ARTIFICIAL HIP JOINT: Chronic | ICD-10-CM

## 2023-01-21 DIAGNOSIS — Z98.890 OTHER SPECIFIED POSTPROCEDURAL STATES: Chronic | ICD-10-CM

## 2023-01-21 DIAGNOSIS — C50.919 MALIGNANT NEOPLASM OF UNSPECIFIED SITE OF UNSPECIFIED FEMALE BREAST: ICD-10-CM

## 2023-01-21 DIAGNOSIS — Z90.89 ACQUIRED ABSENCE OF OTHER ORGANS: Chronic | ICD-10-CM

## 2023-01-23 PROCEDURE — 77427 RADIATION TX MANAGEMENT X5: CPT

## 2023-01-25 NOTE — DISEASE MANAGEMENT
[Pathological] : TNM Stage: p [IB] : IB [TTNM] : 2 [NTNM] : 1a [MTNM] : 0 [de-identified] : 7711 cGy [de-identified] : 5240 cGy [de-identified] : Right breast

## 2023-01-25 NOTE — REVIEW OF SYSTEMS
[Alopecia: Grade 0] : Alopecia: Grade 0 [Skin Atrophy: Grade 0] : Skin Atrophy: Grade 0 [Skin Hyperpigmentation: Grade 1 - Hyperpigmentation covering <10% BSA; no psychosocial impact] : Skin Hyperpigmentation: Grade 1 - Hyperpigmentation covering <10% BSA; no psychosocial impact [Skin Induration: Grade 0] : Skin Induration: Grade 0 [Pruritus: Grade 1 - Mild or localized; topical intervention indicated] : Pruritus: Grade 1 - Mild or localized; topical intervention indicated [Dermatitis Radiation: Grade 2 - Moderate to brisk erythema; patchy moist desquamation, mostly confined to skin folds and creases; moderate edema] : Dermatitis Radiation: Grade 2 - Moderate to brisk erythema; patchy moist desquamation, mostly confined to skin folds and creases; moderate edema

## 2023-01-25 NOTE — HISTORY OF PRESENT ILLNESS
[FreeTextEntry1] : Ms. Salazar is a 72 year old female with prognostic stage IB T2N1a(sn)M0 triple positive invasive ductal carcinoma of the right breast. She underwent lumpectomy with negative margins and had a positive sentinel lymph node biopsy. She recently completed adjuvant chemotherapy and will continue HP every 3 weeks. She is currently receiving adjuvant radiation therapy. \par \par She has increased pain and redness to the right breast and right axilla. She is using Aquaphor, mild itchiness.

## 2023-01-25 NOTE — VITALS
[80: Normal activity with effort; some signs or symptoms of disease.] : 80: Normal activity with effort; some signs or symptoms of disease.  [ECOG Performance Status: 1 - Restricted in physically strenuous activity but ambulatory and able to carry out work of a light or sedentary nature] : Performance Status: 1 - Restricted in physically strenuous activity but ambulatory and able to carry out work of a light or sedentary nature, e.g., light house work, office work [Maximal Pain Intensity: 4/10] : 4/10 [Least Pain Intensity: 0/10] : 0/10

## 2023-01-25 NOTE — PHYSICAL EXAM
[Normal] : well developed, well nourished, in no acute distress [de-identified] : Right lumpectomy and axillary scars are well healed, moderate erythema and rash

## 2023-01-27 ENCOUNTER — APPOINTMENT (OUTPATIENT)
Dept: INFUSION THERAPY | Facility: HOSPITAL | Age: 73
End: 2023-01-27

## 2023-01-30 DIAGNOSIS — Z51.11 ENCOUNTER FOR ANTINEOPLASTIC CHEMOTHERAPY: ICD-10-CM

## 2023-02-02 ENCOUNTER — NON-APPOINTMENT (OUTPATIENT)
Age: 73
End: 2023-02-02

## 2023-02-06 ENCOUNTER — APPOINTMENT (OUTPATIENT)
Dept: DERMATOLOGY | Facility: CLINIC | Age: 73
End: 2023-02-06
Payer: MEDICARE

## 2023-02-06 PROCEDURE — 99214 OFFICE O/P EST MOD 30 MIN: CPT

## 2023-02-12 NOTE — PHYSICAL EXAM
[Normal] : well developed, well nourished, no acute distress [Normal Conjunctiva] : normal conjunctiva [No Xanthelasma] : no xanthelasma [Normal Venous Pressure] : normal venous pressure [Normal S1, S2] : normal S1, S2 [No Murmur] : no murmur [No Rub] : no rub [No Gallop] : no gallop [Clear Lung Fields] : clear lung fields [Good Air Entry] : good air entry [No Respiratory Distress] : no respiratory distress  [Soft] : abdomen soft [Normal Gait] : normal gait [Gait - Sufficient for Exercise Testing] : gait - sufficient for exercise testing [No Edema] : no edema [No Cyanosis] : no cyanosis [No Clubbing] : no clubbing [No Varicosities] : no varicosities [No Rash] : no rash [Moves all extremities] : moves all extremities [No Focal Deficits] : no focal deficits [Normal Speech] : normal speech [Alert and Oriented] : alert and oriented [Normal memory] : normal memory

## 2023-02-13 ENCOUNTER — APPOINTMENT (OUTPATIENT)
Dept: CARDIOLOGY | Facility: CLINIC | Age: 73
End: 2023-02-13
Payer: MEDICARE

## 2023-02-13 VITALS
WEIGHT: 145.5 LBS | RESPIRATION RATE: 16 BRPM | HEART RATE: 84 BPM | SYSTOLIC BLOOD PRESSURE: 111 MMHG | TEMPERATURE: 97 F | BODY MASS INDEX: 28.57 KG/M2 | OXYGEN SATURATION: 95 % | HEIGHT: 60 IN | DIASTOLIC BLOOD PRESSURE: 69 MMHG

## 2023-02-13 DIAGNOSIS — I49.9 CARDIAC ARRHYTHMIA, UNSPECIFIED: ICD-10-CM

## 2023-02-13 PROCEDURE — 93000 ELECTROCARDIOGRAM COMPLETE: CPT

## 2023-02-13 PROCEDURE — 93010 ELECTROCARDIOGRAM REPORT: CPT

## 2023-02-13 PROCEDURE — 99214 OFFICE O/P EST MOD 30 MIN: CPT

## 2023-02-13 NOTE — HISTORY OF PRESENT ILLNESS
[FreeTextEntry1] : Interval History:\michel Feels OK, but appetite is poor. No chest pain. No palpitations. Edema is gone.\par She completed radiation two weeks ago and continues HP every 3 weeks.\par Is due for follow up echo.\par \par ELLA PIÑA is a 72 year old woman with a history of hypercholesterolemia diagnosed with HER2+ right breast cancer in April 2022. Over a decade prior she had a ventricular arrhythmia during an exercise treadmill stress test. She was referred for cardiac catheterization which showed no CAD. She was recommended to take metoprolol and remains on 25 XL daily since. She denies any symptoms. Does not recall why the stress test was ordered. She denies any palpitations, syncope, near syncope, or exertional dyspnea.\par \michel Takes pravastatin for "very high" lipids, which her mother also had. Reports intolerance to other statins previously and mild carotid plaque noted on prior duplex sonogram, all of this over a decade or more ago with her primary care doctor who retired and has since passed on.\par \michel Lives in [place].\par \par Right hip replaced March 31, 2022\par Retired , \par \par 11/18/2022:\par Nearing completion of adjuvant TC (2 more treatments remain).\par Fatigue, but no chest pain or palpitations.\par She takes pravastatin 80 mg daily.\par Reviewed lipids with her.\par \par Cardiovascular Summary:\par ----------------------------------------------\par ECG:\par 2/13/2023: NSR 82 bpm, normal ECG\par 11/18/2022: NSR 83 bpm, normal ECG\par 8/22/2022: NSR and normal ECG\par ----------------------------------------------\par Echo:\par 11/4/2022: EF 55-60 %, GLS - 19.2 %\par 7/26/2022: normal LVEF 58 % and GLS -18.5\par ----------------------------------------------\par Vascular:\par 9/16/2022 Carotid duplex: minimal heterogenous plaque\par 12/19/2022: vascular ultrasound no deep or superficial venous thrombosis bilaterally

## 2023-02-13 NOTE — ASSESSMENT
[FreeTextEntry1] : 72 year old woman with risk factors for HER2 cardiotoxicity, (age, HTN, ? arrhythmia). Baseline echo normal EF and strain and no evident structural heart disease.  We discussed the risk factors, current recommendations, and approach to cardiotoxicity monitoring during breast cancer therapy, including the complementary role of imaging by echo with global longitudinal strain, biomarkers, electrocardiogram, and clinical examination to inform shared decision making.\par \par Prior cardiology records were obtained and reviewed. She had probable rate related LBBB during exercise treadmill testing in 2018 and cardiac cath 2/9/2018 showed normal coronary arteries. \par \par # Atherosclerosis: vacular calcifications reported on staging PET/CT \par - continue aspirin and pravastatin 80 mg daily\par - continue Toprol 25\par - prescriptions renewed\par \par # Risk for cardiomyopathy\par - Continue monitoring with serial echo/GLS every 3 months per protocol during adjuvant HP therapy.\par - If any symptoms or abnormalities in the echo/GLS, repeat biomarkers\par \par # Hypercholesterolemia: lipids acceptable on pravastatin 80 mg daily\par - can continue for now\par \par # Pre-diabetes: A1c 6.1 %, but lost weight during treatment. \par - to repeat next time and will consider SGLT-2\par \par Repeat echo with GLS ordered.\par \par Follow up in 6 months or for any change in symptoms or PRN\par \par Above recommendations discussed with the patient and all questions were answered to the best of my ability and to her apparent satisfaction.

## 2023-02-13 NOTE — REASON FOR VISIT
[FreeTextEntry3] : Dr. Llanes [FreeTextEntry1] : ELLA PIÑA is a 72 year old woman with a history of hypercholesterolemia, arrhythmia, and HER2+ right breast cancer currently on treatment seen for follow up.\par \par Prior Cancer Treatments:\par ------------------------------------------------------------------------\par Chemo/targeted therapy:\par TCHP 8/12/2022-\par ------------------------------------------------------------------------\par Surgery:\par Right lumpectomy 6/28/2022 \par ------------------------------------------------------------------------\par Radiation:\par 5240 cGy to right breast, completed Jan 2023

## 2023-02-13 NOTE — REVIEW OF SYSTEMS
[Feeling Fatigued] : feeling fatigued [Negative] : Heme/Lymph [Change in Appetite] : change in appetite

## 2023-02-17 ENCOUNTER — APPOINTMENT (OUTPATIENT)
Dept: INFUSION THERAPY | Facility: HOSPITAL | Age: 73
End: 2023-02-17

## 2023-02-23 ENCOUNTER — APPOINTMENT (OUTPATIENT)
Dept: CV DIAGNOSITCS | Facility: HOSPITAL | Age: 73
End: 2023-02-23

## 2023-02-23 ENCOUNTER — OUTPATIENT (OUTPATIENT)
Dept: OUTPATIENT SERVICES | Facility: HOSPITAL | Age: 73
LOS: 1 days | End: 2023-02-23
Payer: MEDICARE

## 2023-02-23 DIAGNOSIS — Z91.89 OTHER SPECIFIED PERSONAL RISK FACTORS, NOT ELSEWHERE CLASSIFIED: ICD-10-CM

## 2023-02-23 DIAGNOSIS — Z96.641 PRESENCE OF RIGHT ARTIFICIAL HIP JOINT: Chronic | ICD-10-CM

## 2023-02-23 DIAGNOSIS — C50.919 MALIGNANT NEOPLASM OF UNSPECIFIED SITE OF UNSPECIFIED FEMALE BREAST: ICD-10-CM

## 2023-02-23 DIAGNOSIS — Z98.890 OTHER SPECIFIED POSTPROCEDURAL STATES: Chronic | ICD-10-CM

## 2023-02-23 DIAGNOSIS — Z90.89 ACQUIRED ABSENCE OF OTHER ORGANS: Chronic | ICD-10-CM

## 2023-02-23 PROCEDURE — 93306 TTE W/DOPPLER COMPLETE: CPT | Mod: 26

## 2023-02-28 ENCOUNTER — APPOINTMENT (OUTPATIENT)
Dept: INTERNAL MEDICINE | Facility: CLINIC | Age: 73
End: 2023-02-28
Payer: MEDICARE

## 2023-02-28 VITALS
WEIGHT: 146 LBS | BODY MASS INDEX: 28.66 KG/M2 | HEART RATE: 87 BPM | HEIGHT: 60 IN | SYSTOLIC BLOOD PRESSURE: 146 MMHG | OXYGEN SATURATION: 96 % | TEMPERATURE: 97.8 F | DIASTOLIC BLOOD PRESSURE: 85 MMHG

## 2023-02-28 PROCEDURE — G0439: CPT

## 2023-02-28 RX ORDER — NYSTATIN 100000 [USP'U]/G
100000 CREAM TOPICAL 3 TIMES DAILY
Qty: 1 | Refills: 0 | Status: DISCONTINUED | COMMUNITY
Start: 2022-10-14 | End: 2023-02-28

## 2023-02-28 RX ORDER — KETOCONAZOLE 20 MG/G
2 CREAM TOPICAL
Qty: 1 | Refills: 6 | Status: DISCONTINUED | COMMUNITY
Start: 2022-11-07 | End: 2023-02-28

## 2023-02-28 RX ORDER — NYSTATIN 100000 1/G
100000 POWDER TOPICAL
Qty: 1 | Refills: 0 | Status: DISCONTINUED | COMMUNITY
Start: 2022-10-21 | End: 2023-02-28

## 2023-02-28 RX ORDER — DEXAMETHASONE 4 MG/1
4 TABLET ORAL TWICE DAILY
Qty: 40 | Refills: 0 | Status: DISCONTINUED | COMMUNITY
Start: 2022-07-15 | End: 2023-02-28

## 2023-02-28 RX ORDER — OMEPRAZOLE 40 MG/1
40 CAPSULE, DELAYED RELEASE ORAL
Qty: 30 | Refills: 0 | Status: DISCONTINUED | COMMUNITY
Start: 2022-08-11 | End: 2023-02-28

## 2023-02-28 RX ORDER — FLUCONAZOLE 150 MG/1
150 TABLET ORAL DAILY
Qty: 7 | Refills: 0 | Status: DISCONTINUED | COMMUNITY
Start: 2022-10-21 | End: 2023-02-28

## 2023-02-28 NOTE — HEALTH RISK ASSESSMENT
[Good] : ~his/her~  mood as  good [Yes] : Yes [Monthly or less (1 pt)] : Monthly or less (1 point) [No falls in past year] : Patient reported no falls in the past year [0] : 2) Feeling down, depressed, or hopeless: Not at all (0) [de-identified] : NONE [de-identified] : REGULAR  [Patient reported mammogram was normal] : Patient reported mammogram was normal [Change in mental status noted] : No change in mental status noted [None] : None [Alone] : lives alone [Retired] : retired [Sexually Active] : not sexually active [Feels Safe at Home] : Feels safe at home [Fully functional (bathing, dressing, toileting, transferring, walking, feeding)] : Fully functional (bathing, dressing, toileting, transferring, walking, feeding) [Fully functional (using the telephone, shopping, preparing meals, housekeeping, doing laundry, using] : Fully functional and needs no help or supervision to perform IADLs (using the telephone, shopping, preparing meals, housekeeping, doing laundry, using transportation, managing medications and managing finances) [Reports changes in hearing] : Reports no changes in hearing [Reports changes in vision] : Reports no changes in vision [Reports changes in dental health] : Reports no changes in dental health [Smoke Detector] : smoke detector [Carbon Monoxide Detector] : carbon monoxide detector [Safety elements used in home] : safety elements used in home [Seat Belt] :  uses seat belt [MammogramDate] : 2022 [BoneDensityComments] : ?  needed [ColonoscopyDate] : 2022 [ColonoscopyComments] : BY LENKA FOSTER [Name: ___] : Health Care Proxy's Name: [unfilled]  [Relationship: ___] : Relationship: [unfilled] [AdvancecareDate] : 2/28/23 [Never] : Never

## 2023-02-28 NOTE — HISTORY OF PRESENT ILLNESS
[FreeTextEntry1] : wellness check \par new patient \par \par '\par h/o Ventricular arrhythmia - sees Dr Pool - on BB -  LVEF 58%, no palpitation , CP , or SOB\par Her2 (+) breast t cancer , s/p lumpectomy , R.T , chemo now on Herceptin infusion \par HL / HTN \par

## 2023-02-28 NOTE — ASSESSMENT
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

## 2023-03-09 ENCOUNTER — APPOINTMENT (OUTPATIENT)
Dept: RADIATION ONCOLOGY | Facility: CLINIC | Age: 73
End: 2023-03-09
Payer: MEDICARE

## 2023-03-09 VITALS
DIASTOLIC BLOOD PRESSURE: 77 MMHG | RESPIRATION RATE: 17 BRPM | HEIGHT: 60 IN | OXYGEN SATURATION: 98 % | HEART RATE: 86 BPM | BODY MASS INDEX: 28.66 KG/M2 | SYSTOLIC BLOOD PRESSURE: 129 MMHG | TEMPERATURE: 96.98 F | WEIGHT: 146 LBS

## 2023-03-09 PROCEDURE — 99024 POSTOP FOLLOW-UP VISIT: CPT

## 2023-03-09 NOTE — VITALS
[Least Pain Intensity: 0/10] : 0/10 [80: Normal activity with effort; some signs or symptoms of disease.] : 80: Normal activity with effort; some signs or symptoms of disease.  [ECOG Performance Status: 1 - Restricted in physically strenuous activity but ambulatory and able to carry out work of a light or sedentary nature] : Performance Status: 1 - Restricted in physically strenuous activity but ambulatory and able to carry out work of a light or sedentary nature, e.g., light house work, office work [Maximal Pain Intensity: 0/10] : 0/10

## 2023-03-10 ENCOUNTER — RESULT REVIEW (OUTPATIENT)
Age: 73
End: 2023-03-10

## 2023-03-10 ENCOUNTER — APPOINTMENT (OUTPATIENT)
Dept: HEMATOLOGY ONCOLOGY | Facility: CLINIC | Age: 73
End: 2023-03-10
Payer: MEDICARE

## 2023-03-10 ENCOUNTER — APPOINTMENT (OUTPATIENT)
Dept: INFUSION THERAPY | Facility: HOSPITAL | Age: 73
End: 2023-03-10

## 2023-03-10 VITALS
BODY MASS INDEX: 28.42 KG/M2 | DIASTOLIC BLOOD PRESSURE: 81 MMHG | HEART RATE: 96 BPM | TEMPERATURE: 96.4 F | SYSTOLIC BLOOD PRESSURE: 149 MMHG | RESPIRATION RATE: 18 BRPM | OXYGEN SATURATION: 97 % | WEIGHT: 145.5 LBS

## 2023-03-10 LAB
BASOPHILS # BLD AUTO: 0.03 K/UL — SIGNIFICANT CHANGE UP (ref 0–0.2)
BASOPHILS NFR BLD AUTO: 0.8 % — SIGNIFICANT CHANGE UP (ref 0–2)
EOSINOPHIL # BLD AUTO: 0.09 K/UL — SIGNIFICANT CHANGE UP (ref 0–0.5)
EOSINOPHIL NFR BLD AUTO: 2.3 % — SIGNIFICANT CHANGE UP (ref 0–6)
HCT VFR BLD CALC: 38.7 % — SIGNIFICANT CHANGE UP (ref 34.5–45)
HGB BLD-MCNC: 12.9 G/DL — SIGNIFICANT CHANGE UP (ref 11.5–15.5)
IMM GRANULOCYTES NFR BLD AUTO: 0.3 % — SIGNIFICANT CHANGE UP (ref 0–0.9)
LYMPHOCYTES # BLD AUTO: 1.42 K/UL — SIGNIFICANT CHANGE UP (ref 1–3.3)
LYMPHOCYTES # BLD AUTO: 36.8 % — SIGNIFICANT CHANGE UP (ref 13–44)
MCHC RBC-ENTMCNC: 31.2 PG — SIGNIFICANT CHANGE UP (ref 27–34)
MCHC RBC-ENTMCNC: 33.3 G/DL — SIGNIFICANT CHANGE UP (ref 32–36)
MCV RBC AUTO: 93.7 FL — SIGNIFICANT CHANGE UP (ref 80–100)
MONOCYTES # BLD AUTO: 0.3 K/UL — SIGNIFICANT CHANGE UP (ref 0–0.9)
MONOCYTES NFR BLD AUTO: 7.8 % — SIGNIFICANT CHANGE UP (ref 2–14)
NEUTROPHILS # BLD AUTO: 2.01 K/UL — SIGNIFICANT CHANGE UP (ref 1.8–7.4)
NEUTROPHILS NFR BLD AUTO: 52 % — SIGNIFICANT CHANGE UP (ref 43–77)
NRBC # BLD: 0 /100 WBCS — SIGNIFICANT CHANGE UP (ref 0–0)
PLATELET # BLD AUTO: 201 K/UL — SIGNIFICANT CHANGE UP (ref 150–400)
RBC # BLD: 4.13 M/UL — SIGNIFICANT CHANGE UP (ref 3.8–5.2)
RBC # FLD: 13.4 % — SIGNIFICANT CHANGE UP (ref 10.3–14.5)
WBC # BLD: 3.86 K/UL — SIGNIFICANT CHANGE UP (ref 3.8–10.5)
WBC # FLD AUTO: 3.86 K/UL — SIGNIFICANT CHANGE UP (ref 3.8–10.5)

## 2023-03-10 PROCEDURE — 99215 OFFICE O/P EST HI 40 MIN: CPT

## 2023-03-10 NOTE — HISTORY OF PRESENT ILLNESS
[FreeTextEntry1] : Ms. Salazar is a 72 year old female with prognostic stage IB T2N1a(sn)M0 triple positive invasive ductal carcinoma of the right breast. She underwent lumpectomy with negative margins and had a positive sentinel lymph node biopsy. She completed adjuvant chemotherapy and will continue HP every 3 weeks. She completed a course of adjuvant radiation therapy to the right breast, a total dose of 5.240 cGy from 12/29/23 - 1/27/23.\par \par She comes today for a post-treatment evaluation. The patient reports feeling generally well.  She is involved in her usual activities.  She has a good appetite and denies dysphagia. She denies cough or shortness of breath.  There has been some improvement in the skin since completing radiation therapy. Her energy improving, now able to walk a good distance without issue, including uphill. This represents a significant improvement in her function as per her. However, she does note that she received a call from Dr. Baron yesterday noting that her EF was 49% at last echo and that she has been initiated on Entresto for this. She is planned to see Dr. Llanes for follow-up tomorrow.

## 2023-03-10 NOTE — PHYSICAL EXAM
[] : no respiratory distress [Sclera] : the sclera and conjunctiva were normal [Abdomen Soft] : soft [Nondistended] : nondistended [Normal] : no palpable adenopathy [Supraclavicular Lymph Nodes Enlarged Bilaterally] : supraclavicular [Axillary Lymph Nodes Enlarged Bilaterally] : axillary [de-identified] : Right lumpectomy and axillary scars are well healed, mild residual hyperpigmentation

## 2023-03-11 LAB
ALBUMIN SERPL ELPH-MCNC: 4.4 G/DL — SIGNIFICANT CHANGE UP (ref 3.3–5)
ALP SERPL-CCNC: 85 U/L — SIGNIFICANT CHANGE UP (ref 40–120)
ALT FLD-CCNC: 13 U/L — SIGNIFICANT CHANGE UP (ref 10–45)
ANION GAP SERPL CALC-SCNC: 17 MMOL/L — SIGNIFICANT CHANGE UP (ref 5–17)
AST SERPL-CCNC: 16 U/L — SIGNIFICANT CHANGE UP (ref 10–40)
BILIRUB SERPL-MCNC: 0.2 MG/DL — SIGNIFICANT CHANGE UP (ref 0.2–1.2)
BUN SERPL-MCNC: 16 MG/DL — SIGNIFICANT CHANGE UP (ref 7–23)
CALCIUM SERPL-MCNC: 9.5 MG/DL — SIGNIFICANT CHANGE UP (ref 8.4–10.5)
CHLORIDE SERPL-SCNC: 102 MMOL/L — SIGNIFICANT CHANGE UP (ref 96–108)
CO2 SERPL-SCNC: 20 MMOL/L — LOW (ref 22–31)
CREAT SERPL-MCNC: 0.61 MG/DL — SIGNIFICANT CHANGE UP (ref 0.5–1.3)
EGFR: 94 ML/MIN/1.73M2 — SIGNIFICANT CHANGE UP
GLUCOSE SERPL-MCNC: 98 MG/DL — SIGNIFICANT CHANGE UP (ref 70–99)
MAGNESIUM SERPL-MCNC: 2.1 MG/DL — SIGNIFICANT CHANGE UP (ref 1.6–2.6)
NT-PROBNP SERPL-SCNC: 43 PG/ML — SIGNIFICANT CHANGE UP (ref 0–300)
POTASSIUM SERPL-MCNC: 4.3 MMOL/L — SIGNIFICANT CHANGE UP (ref 3.5–5.3)
POTASSIUM SERPL-SCNC: 4.3 MMOL/L — SIGNIFICANT CHANGE UP (ref 3.5–5.3)
PROT SERPL-MCNC: 6.6 G/DL — SIGNIFICANT CHANGE UP (ref 6–8.3)
SODIUM SERPL-SCNC: 139 MMOL/L — SIGNIFICANT CHANGE UP (ref 135–145)
TROPONIN T, HIGH SENSITIVITY RESULT: <6 NG/L — SIGNIFICANT CHANGE UP (ref 0–51)

## 2023-03-11 NOTE — PHYSICAL EXAM
[Restricted in physically strenuous activity but ambulatory and able to carry out work of a light or sedentary nature] : Status 1- Restricted in physically strenuous activity but ambulatory and able to carry out work of a light or sedentary nature, e.g., light house work, office work [Normal] : affect appropriate [de-identified] : Left sided med port in place CDI [de-identified] :  Well healed Right breast lumpectomy scar

## 2023-03-11 NOTE — ASSESSMENT
[FreeTextEntry1] : Ms. ELLA PIÑA is a 72 year old female with stage IB  ( pT2, N1a,Mx ) ER/ RI/ HER-2/eladio positive Invasive poorly differentiated ductal carcinoma of the right breast. She is s/p lumpectomy on 6/28/22. Adjuvant TCyHP started 8/2022\par \par \par - Breast ca: Ms. ELLA PIÑA is S/p lumpectomy. s/p adjuvant TCyHP on 8/12/22- 11/2022\par \par \par 3/2023\par Noted to have 10 point drop in EF \par No CP no SOB no palpitations, no LE swelling \par Saw Dr Yrn De La Cruz and on cardiac meds \par OK to treat with HP and close monitoring \par Repeat echo in 1 m \par RT finished 2/2023\par Will start AI x 10 yrs\par \par I recommend Arimidex 1 mg daily for 10 years. I discussed the risks and benefits of aromatase inhibitor therapy including fatigue, coronary artery disease, hyperlipidemia, vaginal dryness, mood changes, hot flashes, GI disturbances, arthralgias, myalgias, and osteoporosis. \par \par \par \par - Chemo induced cardiomyopathy: Check ECHO . Continue cardiac meds\par She has cardiac history and therefore is f/b Dr Pool-Jono\par - Chemo induced anemia-  GD1 . No transfusion needed. Monitor counts\par - Chemo induced diarrhea- Continue Imodium prn. IV fluids if sx worsen\par - Emotional support provided, all questions answered. .\par - Instructed to call office and go directly to the emergency room with fever more than 100.4, shaking chills, productive cough, sore throat, shortness of breath or urinary symptoms. Patient verbalized understanding and agreement.\par \par CHEMO, IV fluids and BW ordered in [facility]\par \par RTO q 3 w for HP\par HP CHEMO ORDERED IN [facility] FOR TODAY\par

## 2023-03-11 NOTE — HISTORY OF PRESENT ILLNESS
[T: ___] : T[unfilled] [N: ___] : N[unfilled] [M: ___] : M[unfilled] [AJCC Stage: ____] : AJCC Stage: [unfilled] [3 - Distress Level] : Distress Level: 3 [de-identified] : Ms. ELLA PIÑA is a 72 year old female here for f/u appointment for ER/ OR/ HER-2/eladio positive right breast cancer dx 6/2022.  S/p lumpectomy. s/p adjuvant TCyHP on 8/12/22- 11/2022. Currently on HP x 1y\par \par 3/2023\par Noted to have 10 point drop in EF \par No CP no SOB no palpitations, no LE swelling \par Saw Dr Yrn De La Cruz and on cardiac meds \par OK to treat with HP and close monitoring \par Repeat echo in 1 m \par RT finished 2/2023\par Will start AI x 10 yrs\par \par  [de-identified] : Ms.Linda Salazar is a 72 year old female here for an evaluation of breast cancer. Her oncologic history is as follows:\par \par She underwent routine breast imaging on 4/27/22 (BIRADS 0) which showed a right breast mass calcifications for which additional imaging is rec. Additional right breast imaging \par on 5/27/22 ( BIRADS 5) showed a persistent mass with spiculation and calcifications in the posterior lower slightly outer right breast on mammo which likely corresponds to a newly sonographically seen highly suspicious irregular hypoechoic mass at 6:00 on sono for which US biopsy is rec. \par  \par She underwent  right breast 6:00, 8 cmfn lesion core biopsy on 6/9/2022 which showed Invasive poorly differentiated ductal carcinoma, (invasive mammary carcinoma,NST), Parowan score 8/9, measuring 1.3 cm, ER+ 95%, PgR+ 25%,HER-2 +. Ductal carcinoma in situ, solid pattern with high grade,atypia, comedo necrosis and microcalcifications present. No lymphovascular permeation seen.\par \par She underwent a breast MRI on 6/16/22 (BIRADS 6) which showed 3.0 cm biopsy proven malignancy at the 6:00 position of the right breast with adjacent small satellite nodules. There was no MRI evidence of multicentric or contralateral disease.\par \par She underwent right breast lumpectomy on 6/28/22 which revealed Invasive poorly differentiated ductal carcinoma, Parowan grad 3, measuring 22 mm,.Margin were negative, Lymphovascular invasion was not seen. High grade, cribriform, solid type DCIS, was noted.Two sentinel lymph node were removed and  one was positive for metastasis measuring 0.7 cm..\par \par PCP retired, wants a new one\par [facility]- [facility],will establish cardio \par \par Started  TCHP+ onpro #1 on 8/12/22 Counts good\par Chemo expected and unexpected side effects discussed, including but not limited to hair loss, fatigue, change in taste, mouth sores, nausea, vomiting, diarrhea, infusion reaction, allergic reaction, significant myelosuppression, need for blood transfusion, infection, bleeding, cardiac toxicity, neuropathy, kidney injury, nerve pain, muscle pain and detrimental effect on liver and heart function. .\par Medication for symptom management reviewed and questions answered\par 7/26/22 ECHO LVEF 58.2%\par 07/20/2022 PET CT CAP:NICHOLAS\par EKG done , Chemo consent obtained \par 8/11/22 Left side Mediport in place, catheter tip at the distal SVC\par Patient is a anxious about stating chemo today\par Emotional support given\par \par She is s/p TCHP+ onpro on 8/12/22, she is here today 8/19/22 for IV fluids. \par She reports mild to moderate fatigue and altered taste x 3 days. She was able to function, maintained PO intake, weight stable. She had mild nausea, took Reglan, no vomiting. SHe had diarrhea, used imodium. No mouth sores. No Bone pain, no neuropathy, no fevers. No cardio pulm sx. She has an appointment and will ne f/b cardio onc  Dr Baron due to her cardiac history..\par \par S/p C2D8 today 9/9/22\par She reports mild-moderate fatigue and altered taste x 3-4 days after chemo. She was able to function, maintained PO intake, weight stable. She had mild nausea, took Reglan, no vomiting, or mouth sores. Mild diarrhea, controlled with imodium. No Bone pain, no neuropathy, no fevers\par \par Last TCHP 11/25/22\par chemo s/e are slowly improving\par Started RT \par Has LE sweeling, s/p doppler - neg for DVT\par She reports rash on BL arms, started 2 weeks ago. Reports she had similar rash in the past ( pre chemo). Seeing derm. Using steroid cream as spot treatment\par ECHo 11/2022\par start AI after RT \par \par \par C6 day 8 today 12/2/22\par She reports moderate fatigue and altered taste x 3-4 days after chemo. She was able to function, maintained PO intake, weight stable. She had mild nausea, took Reglan, no vomiting, or mouth sores. Mild diarrhea, controlled with imodium. She has mild Bone pain, no neuropathy, no fevers. No cardio pulm sx\par Breast rash has improved with antifungal cream\par No more syncopal epsiode \par echo 7/2022, 11/2022: stable, follows with Dr Yrn De La Cruz \par port ok\par Continuation of HP and RT d/w her today\par Will start AI after RT\par \par 1/2023: \par Last TCHP 11/25/22\par chemo s/e are slowly improving\par Started RT \par Currently on HP q3w . Denies CP sob\par Has LE swelling, s/p doppler - neg for DVT\par She reports rash on BL arms, started 2 weeks ago. Reports she had similar rash in the past ( pre chemo). Seeing derm. Using steroid cream as spot treatment\par ECHo 11/2022\par start AI after RT

## 2023-03-26 ENCOUNTER — RX RENEWAL (OUTPATIENT)
Age: 73
End: 2023-03-26

## 2023-03-28 ENCOUNTER — APPOINTMENT (OUTPATIENT)
Dept: CV DIAGNOSITCS | Facility: HOSPITAL | Age: 73
End: 2023-03-28

## 2023-03-28 ENCOUNTER — OUTPATIENT (OUTPATIENT)
Dept: OUTPATIENT SERVICES | Facility: HOSPITAL | Age: 73
LOS: 1 days | End: 2023-03-28
Payer: MEDICARE

## 2023-03-28 ENCOUNTER — APPOINTMENT (OUTPATIENT)
Dept: CARDIOLOGY | Facility: CLINIC | Age: 73
End: 2023-03-28

## 2023-03-28 ENCOUNTER — OUTPATIENT (OUTPATIENT)
Dept: OUTPATIENT SERVICES | Facility: HOSPITAL | Age: 73
LOS: 1 days | Discharge: HOME | End: 2023-03-28

## 2023-03-28 DIAGNOSIS — Z96.641 PRESENCE OF RIGHT ARTIFICIAL HIP JOINT: Chronic | ICD-10-CM

## 2023-03-28 DIAGNOSIS — Z98.890 OTHER SPECIFIED POSTPROCEDURAL STATES: Chronic | ICD-10-CM

## 2023-03-28 DIAGNOSIS — C50.919 MALIGNANT NEOPLASM OF UNSPECIFIED SITE OF UNSPECIFIED FEMALE BREAST: ICD-10-CM

## 2023-03-28 DIAGNOSIS — Z90.89 ACQUIRED ABSENCE OF OTHER ORGANS: Chronic | ICD-10-CM

## 2023-03-28 DIAGNOSIS — Z91.89 OTHER SPECIFIED PERSONAL RISK FACTORS, NOT ELSEWHERE CLASSIFIED: ICD-10-CM

## 2023-03-28 PROCEDURE — 93306 TTE W/DOPPLER COMPLETE: CPT | Mod: 26

## 2023-03-31 ENCOUNTER — OUTPATIENT (OUTPATIENT)
Dept: OUTPATIENT SERVICES | Facility: HOSPITAL | Age: 73
LOS: 1 days | End: 2023-03-31
Payer: MEDICARE

## 2023-03-31 ENCOUNTER — APPOINTMENT (OUTPATIENT)
Dept: RADIOLOGY | Facility: IMAGING CENTER | Age: 73
End: 2023-03-31
Payer: MEDICARE

## 2023-03-31 ENCOUNTER — APPOINTMENT (OUTPATIENT)
Dept: INFUSION THERAPY | Facility: HOSPITAL | Age: 73
End: 2023-03-31

## 2023-03-31 DIAGNOSIS — Z98.890 OTHER SPECIFIED POSTPROCEDURAL STATES: Chronic | ICD-10-CM

## 2023-03-31 DIAGNOSIS — Z00.00 ENCOUNTER FOR GENERAL ADULT MEDICAL EXAMINATION WITHOUT ABNORMAL FINDINGS: ICD-10-CM

## 2023-03-31 PROCEDURE — 77080 DXA BONE DENSITY AXIAL: CPT | Mod: 26

## 2023-03-31 PROCEDURE — 77080 DXA BONE DENSITY AXIAL: CPT

## 2023-04-03 ENCOUNTER — NON-APPOINTMENT (OUTPATIENT)
Age: 73
End: 2023-04-03

## 2023-04-03 DIAGNOSIS — Z51.11 ENCOUNTER FOR ANTINEOPLASTIC CHEMOTHERAPY: ICD-10-CM

## 2023-04-06 RX ORDER — TACROLIMUS 1 MG/G
0.1 OINTMENT TOPICAL
Qty: 1 | Refills: 1 | Status: ACTIVE | COMMUNITY
Start: 2023-04-06 | End: 1900-01-01

## 2023-04-15 NOTE — PHYSICAL EXAM
[Normal] : well developed, well nourished, no acute distress [Normal Conjunctiva] : normal conjunctiva [No Xanthelasma] : no xanthelasma [Normal Venous Pressure] : normal venous pressure [Normal S1, S2] : normal S1, S2 [No Murmur] : no murmur [No Gallop] : no gallop [No Rub] : no rub [Clear Lung Fields] : clear lung fields [Good Air Entry] : good air entry [No Respiratory Distress] : no respiratory distress  [Soft] : abdomen soft [Normal Gait] : normal gait [Gait - Sufficient for Exercise Testing] : gait - sufficient for exercise testing [No Edema] : no edema [No Cyanosis] : no cyanosis [No Clubbing] : no clubbing [No Varicosities] : no varicosities [No Rash] : no rash [Moves all extremities] : moves all extremities [No Focal Deficits] : no focal deficits [Normal Speech] : normal speech [Alert and Oriented] : alert and oriented [Normal memory] : normal memory

## 2023-04-17 ENCOUNTER — APPOINTMENT (OUTPATIENT)
Dept: CARDIOLOGY | Facility: CLINIC | Age: 73
End: 2023-04-17
Payer: MEDICARE

## 2023-04-17 VITALS
SYSTOLIC BLOOD PRESSURE: 128 MMHG | OXYGEN SATURATION: 96 % | TEMPERATURE: 97.1 F | BODY MASS INDEX: 29 KG/M2 | WEIGHT: 147.71 LBS | HEART RATE: 76 BPM | RESPIRATION RATE: 18 BRPM | DIASTOLIC BLOOD PRESSURE: 81 MMHG | HEIGHT: 60 IN

## 2023-04-17 PROCEDURE — 99214 OFFICE O/P EST MOD 30 MIN: CPT

## 2023-04-17 PROCEDURE — 93010 ELECTROCARDIOGRAM REPORT: CPT

## 2023-04-17 PROCEDURE — 93000 ELECTROCARDIOGRAM COMPLETE: CPT

## 2023-04-17 RX ORDER — PRAVASTATIN SODIUM 40 MG/1
40 TABLET ORAL
Qty: 90 | Refills: 0 | Status: COMPLETED | COMMUNITY
Start: 2023-03-26 | End: 2023-04-17

## 2023-04-17 NOTE — ASSESSMENT
[FreeTextEntry1] : 72 year old woman with risk factors for HER2 cardiotoxicity, (age, HTN, ? arrhythmia). Baseline echo normal EF and strain and no evident structural heart disease.  We discussed the risk factors, current recommendations, and approach to cardiotoxicity monitoring during breast cancer therapy, including the complementary role of imaging by echo with global longitudinal strain, biomarkers, electrocardiogram, and clinical examination to inform shared decision making.\par \par Prior cardiology records were obtained and reviewed. She had probable rate related LBBB during exercise treadmill testing in 2018 and cardiac cath 2/9/2018 showed normal coronary arteries. \par \par # Atherosclerosis: vacular calcifications reported on staging PET/CT \par - continue aspirin and pravastatin 80 mg daily\par - continue Toprol 25\par \par \par # Asymptomatic decline in LVEF during HER2 targeted therapy\par - check pro-BNP and troponin T today (at her request will have this done in a few days with other labs through port)\par - Increase Entresto to 49/51 BID now as tolerated\par - continue Toprol\par - repeat echo in 2 months (approx 3 months from prior), order placed\par - discussed symptoms of CHF that she should report\par \par # Hypercholesterolemia: lipids acceptable on pravastatin 80 mg daily\par - can continue for now\par \par # Pre-diabetes: A1c 6.1 %, but lost weight during treatment. \par - to repeat with upcoming bloods and consider SGLT2\par \par Repeat echo with GLS ordered for 2 months\par \par Follow up in 3 months with me.\par \par Above recommendations discussed with the patient and all questions were answered to the best of my ability and to her apparent satisfaction.

## 2023-04-17 NOTE — HISTORY OF PRESENT ILLNESS
[FreeTextEntry1] : Interval History:\par There was a decline in left ventricular ejection fraction on follow up echo in February, so Entresto 24/26 BID was added. She was asymptomatic. On follow up echo one month later, LV and GLS improved slightly. \par Has continued HER2 therapy.\par Notes some dyspnea walking up an incline while carrying packages, but otherwise feels fine. No edema, orthopnea, palpitations. In general, feels better than a few months ago.\par Tolerating Entresto.\par Started anastrazole\par \par History:\par ELLA PIAÑ is a 72 year old woman with a history of hypercholesterolemia diagnosed with HER2+ right breast cancer in April 2022. Over a decade prior she had a ventricular arrhythmia during an exercise treadmill stress test. She was referred for cardiac catheterization which showed no CAD. She was recommended to take metoprolol and remains on 25 XL daily since. She denies any symptoms. Does not recall why the stress test was ordered. She denies any palpitations, syncope, near syncope, or exertional dyspnea.\par \par Takes pravastatin for "very high" lipids, which her mother also had. Reports intolerance to other statins previously and mild carotid plaque noted on prior duplex sonogram, all of this over a decade or more ago with her primary care doctor who retired and has since passed on.\par \par Lives in [place].\par \par Right hip replaced March 31, 2022\par Retired , \par \par 11/18/2022:\par Nearing completion of adjuvant TC (2 more treatments remain).\par Fatigue, but no chest pain or palpitations.\par She takes pravastatin 80 mg daily.\par Reviewed lipids with her.\par \par \par 2/13/2023:\par Feels OK, but appetite is poor. No chest pain. No palpitations. Edema is gone.\par She completed radiation two weeks ago and continues HP every 3 weeks.\par Is due for follow up echo.\par \par Cardiovascular Summary:\par ----------------------------------------------\par ECG:\par 4/17/2023: NSR 75 bpm, normal ECG\par 2/13/2023: NSR 82 bpm, normal ECG\par 11/18/2022: NSR 83 bpm, normal ECG\par 8/22/2022: NSR and normal ECG\par ----------------------------------------------\par Echo:\par 3/28/2023: EF 50 %, GLS -14.3 \par 2/23/2023: LVEF 49 %, GLS - 9.5 %\par 11/4/2022: EF 55-60 %, GLS - 19.2 %\par 7/26/2022: normal LVEF 58 % and GLS -18.5\par ----------------------------------------------\par Vascular:\par 9/16/2022 Carotid duplex: minimal heterogenous plaque\par 12/19/2022: vascular ultrasound no deep or superficial venous thrombosis bilaterally

## 2023-04-17 NOTE — REASON FOR VISIT
[FreeTextEntry3] : Dr. Llanes [FreeTextEntry1] : ELLA PIÑA is a 72 year old woman with a history of hypercholesterolemia, arrhythmia, and HER2+ right breast cancer seen for follow up of risk factors and treatment related cardiac dysfunction.\par \par Prior Cancer Treatments:\par ------------------------------------------------------------------------\par Chemo/targeted therapy:\par TCHP 8/12/2022-\par 3/2023: anastrazole\par ------------------------------------------------------------------------\par Surgery:\par Right lumpectomy 6/28/2022 \par ------------------------------------------------------------------------\par Radiation:\par 5240 cGy to right breast, completed Jan 2023

## 2023-04-21 ENCOUNTER — RESULT REVIEW (OUTPATIENT)
Age: 73
End: 2023-04-21

## 2023-04-21 ENCOUNTER — APPOINTMENT (OUTPATIENT)
Dept: MAMMOGRAPHY | Facility: IMAGING CENTER | Age: 73
End: 2023-04-21
Payer: MEDICARE

## 2023-04-21 ENCOUNTER — APPOINTMENT (OUTPATIENT)
Dept: ULTRASOUND IMAGING | Facility: IMAGING CENTER | Age: 73
End: 2023-04-21
Payer: MEDICARE

## 2023-04-21 ENCOUNTER — OUTPATIENT (OUTPATIENT)
Dept: OUTPATIENT SERVICES | Facility: HOSPITAL | Age: 73
LOS: 1 days | End: 2023-04-21
Payer: MEDICARE

## 2023-04-21 ENCOUNTER — APPOINTMENT (OUTPATIENT)
Dept: INFUSION THERAPY | Facility: HOSPITAL | Age: 73
End: 2023-04-21

## 2023-04-21 ENCOUNTER — LABORATORY RESULT (OUTPATIENT)
Age: 73
End: 2023-04-21

## 2023-04-21 ENCOUNTER — APPOINTMENT (OUTPATIENT)
Dept: HEMATOLOGY ONCOLOGY | Facility: CLINIC | Age: 73
End: 2023-04-21
Payer: MEDICARE

## 2023-04-21 VITALS
TEMPERATURE: 97.2 F | WEIGHT: 148.81 LBS | BODY MASS INDEX: 29.22 KG/M2 | HEIGHT: 60 IN | DIASTOLIC BLOOD PRESSURE: 77 MMHG | RESPIRATION RATE: 16 BRPM | HEART RATE: 80 BPM | OXYGEN SATURATION: 98 % | SYSTOLIC BLOOD PRESSURE: 141 MMHG

## 2023-04-21 DIAGNOSIS — Z00.8 ENCOUNTER FOR OTHER GENERAL EXAMINATION: ICD-10-CM

## 2023-04-21 DIAGNOSIS — Z96.641 PRESENCE OF RIGHT ARTIFICIAL HIP JOINT: Chronic | ICD-10-CM

## 2023-04-21 DIAGNOSIS — Z90.89 ACQUIRED ABSENCE OF OTHER ORGANS: Chronic | ICD-10-CM

## 2023-04-21 LAB
A1C WITH ESTIMATED AVERAGE GLUCOSE RESULT: 6.1 % — HIGH (ref 4–5.6)
ALBUMIN SERPL ELPH-MCNC: 4.5 G/DL — SIGNIFICANT CHANGE UP (ref 3.3–5)
ALP SERPL-CCNC: 95 U/L — SIGNIFICANT CHANGE UP (ref 40–120)
ALT FLD-CCNC: 11 U/L — SIGNIFICANT CHANGE UP (ref 10–45)
ANION GAP SERPL CALC-SCNC: 15 MMOL/L — SIGNIFICANT CHANGE UP (ref 5–17)
AST SERPL-CCNC: 17 U/L — SIGNIFICANT CHANGE UP (ref 10–40)
BILIRUB SERPL-MCNC: 0.2 MG/DL — SIGNIFICANT CHANGE UP (ref 0.2–1.2)
BUN SERPL-MCNC: 18 MG/DL — SIGNIFICANT CHANGE UP (ref 7–23)
CALCIUM SERPL-MCNC: 10 MG/DL — SIGNIFICANT CHANGE UP (ref 8.4–10.5)
CHLORIDE SERPL-SCNC: 101 MMOL/L — SIGNIFICANT CHANGE UP (ref 96–108)
CHOLEST SERPL-MCNC: 267 MG/DL — HIGH
CO2 SERPL-SCNC: 22 MMOL/L — SIGNIFICANT CHANGE UP (ref 22–31)
CREAT SERPL-MCNC: 0.74 MG/DL — SIGNIFICANT CHANGE UP (ref 0.5–1.3)
EGFR: 85 ML/MIN/1.73M2 — SIGNIFICANT CHANGE UP
ESTIMATED AVERAGE GLUCOSE: 128 MG/DL — HIGH (ref 68–114)
GLUCOSE SERPL-MCNC: 101 MG/DL — HIGH (ref 70–99)
HDLC SERPL-MCNC: 72 MG/DL — SIGNIFICANT CHANGE UP
LIPID PNL WITH DIRECT LDL SERPL: 155 MG/DL — HIGH
NON HDL CHOLESTEROL: 194 MG/DL — HIGH
NT-PROBNP SERPL-SCNC: 59 PG/ML — SIGNIFICANT CHANGE UP (ref 0–300)
POTASSIUM SERPL-MCNC: 4.9 MMOL/L — SIGNIFICANT CHANGE UP (ref 3.5–5.3)
POTASSIUM SERPL-SCNC: 4.9 MMOL/L — SIGNIFICANT CHANGE UP (ref 3.5–5.3)
PROT SERPL-MCNC: 6.6 G/DL — SIGNIFICANT CHANGE UP (ref 6–8.3)
SODIUM SERPL-SCNC: 138 MMOL/L — SIGNIFICANT CHANGE UP (ref 135–145)
T4 FREE+ TSH PNL SERPL: 2.1 UIU/ML — SIGNIFICANT CHANGE UP (ref 0.27–4.2)
TRIGL SERPL-MCNC: 195 MG/DL — HIGH

## 2023-04-21 PROCEDURE — 76641 ULTRASOUND BREAST COMPLETE: CPT | Mod: 26,50,GA

## 2023-04-21 PROCEDURE — 77066 DX MAMMO INCL CAD BI: CPT | Mod: 26

## 2023-04-21 PROCEDURE — 76641 ULTRASOUND BREAST COMPLETE: CPT

## 2023-04-21 PROCEDURE — 77066 DX MAMMO INCL CAD BI: CPT

## 2023-04-21 PROCEDURE — 99214 OFFICE O/P EST MOD 30 MIN: CPT

## 2023-04-21 PROCEDURE — G0279: CPT

## 2023-04-21 PROCEDURE — G0279: CPT | Mod: 26

## 2023-04-22 NOTE — ASSESSMENT
[FreeTextEntry1] : Ms. ELLA PIÑA is a 72 year old female with stage IB  ( pT2, N1a,Mx ) ER/ [place]/ HER-2/eladio positive Invasive poorly differentiated ductal carcinoma of the right breast. She is s/p lumpectomy on 6/28/22. Adjuvant TCyHP started 8/2022\par \par \par - Breast ca: Ms. ELLA PIÑA is S/p lumpectomy. s/p adjuvant TCyHP on 8/12/22- 11/2022\par \par 4/2023\par Takes AI daily, good compliance. She denies aches/pain, hot flashes, vag dryness, excessive fatigue, GI s/e, hair loss. She is active, no change in energy, wt or appetite. \par mammogram - Dr Hua\par DEXA- 3/2023 osteopenia.  She takes ca+vit D\par She had drop in EF 2/2023, repeat 3/2023 stable EF 50%. She is f/b cardio onc\par No CP no SOB no palpitations, no LE swelling \par She is getting HP today.\par No c/o in the port area\par \par - Chemo induced cardiomyopathy: Check ECHO . Continue cardiac meds\par She has cardiac history and therefore is f/b Dr Pool-Jono\par - Chemo induced anemia-  GD1 . No transfusion needed. Monitor counts\par - Chemo induced diarrhea- Continue Imodium prn. IV fluids if sx worsen\par - Emotional support provided, all questions answered. .\par - Instructed to call office and go directly to the emergency room with fever more than 100.4, shaking chills, productive cough, sore throat, shortness of breath or urinary symptoms. Patient verbalized understanding and agreement.\par \par CHEMO, IV fluids and BW ordered in [facility]\par \par RTO q 3 w for HP\par  [Curative] : Goals of care discussed with patient: Curative

## 2023-04-22 NOTE — PHYSICAL EXAM
[Restricted in physically strenuous activity but ambulatory and able to carry out work of a light or sedentary nature] : Status 1- Restricted in physically strenuous activity but ambulatory and able to carry out work of a light or sedentary nature, e.g., light house work, office work [Normal] : affect appropriate [de-identified] : Left sided med port in place CDI [de-identified] :  Well healed Right breast lumpectomy scar

## 2023-04-22 NOTE — HISTORY OF PRESENT ILLNESS
[T: ___] : T[unfilled] [N: ___] : N[unfilled] [M: ___] : M[unfilled] [AJCC Stage: ____] : AJCC Stage: [unfilled] [3 - Distress Level] : Distress Level: 3 [de-identified] : Ms.Linda Salazar is a 72 year old female here for an evaluation of breast cancer. Her oncologic history is as follows:\par \par She underwent routine breast imaging on 4/27/22 (BIRADS 0) which showed a right breast mass calcifications for which additional imaging is rec. Additional right breast imaging \par on 5/27/22 ( BIRADS 5) showed a persistent mass with spiculation and calcifications in the posterior lower slightly outer right breast on mammo which likely corresponds to a newly sonographically seen highly suspicious irregular hypoechoic mass at 6:00 on sono for which US biopsy is rec. \par  \par She underwent  right breast 6:00, 8 cmfn lesion core biopsy on 6/9/2022 which showed Invasive poorly differentiated ductal carcinoma, (invasive mammary carcinoma,NST), [place] score 8/9, measuring 1.3 cm, ER+ 95%, PgR+ 25%,HER-2 +. Ductal carcinoma in situ, solid pattern with high grade,atypia, comedo necrosis and microcalcifications present. No lymphovascular permeation seen.\par \par She underwent a breast MRI on 6/16/22 (BIRADS 6) which showed 3.0 cm biopsy proven malignancy at the 6:00 position of the right breast with adjacent small satellite nodules. There was no MRI evidence of multicentric or contralateral disease.\par \par She underwent right breast lumpectomy on 6/28/22 which revealed Invasive poorly differentiated ductal carcinoma, [place] grad 3, measuring 22 mm,.Margin were negative, Lymphovascular invasion was not seen. High grade, cribriform, solid type DCIS, was noted.Two sentinel lymph node were removed and  one was positive for metastasis measuring 0.7 cm..\par \par PCP retired, wants a new one\par [facility],will establish cardio \par \par Started  TCHP+ onpro #1 on 8/12/22 Counts good\par Chemo expected and unexpected side effects discussed, including but not limited to hair loss, fatigue, change in taste, mouth sores, nausea, vomiting, diarrhea, infusion reaction, allergic reaction, significant myelosuppression, need for blood transfusion, infection, bleeding, cardiac toxicity, neuropathy, kidney injury, nerve pain, muscle pain and detrimental effect on liver and heart function. .\par Medication for symptom management reviewed and questions answered\par 7/26/22 ECHO LVEF 58.2%\par 07/20/2022 PET CT CAP:NICHOLAS\par EKG done , Chemo consent obtained \par 8/11/22 Left side Mediport in place, catheter tip at the distal SVC\par Patient is a anxious about stating chemo today\par Emotional support given\par \par She is s/p TCHP+ onpro on 8/12/22, she is here today 8/19/22 for IV fluids. \par She reports mild to moderate fatigue and altered taste x 3 days. She was able to function, maintained PO intake, weight stable. She had mild nausea, took Reglan, no vomiting. SHe had diarrhea, used imodium. No mouth sores. No Bone pain, no neuropathy, no fevers. No cardio pulm sx. She has an appointment and will ne f/b cardio onc  Dr Baron due to her cardiac history..\par \par S/p C2D8 today 9/9/22\par She reports mild-moderate fatigue and altered taste x 3-4 days after chemo. She was able to function, maintained PO intake, weight stable. She had mild nausea, took Reglan, no vomiting, or mouth sores. Mild diarrhea, controlled with imodium. No Bone pain, no neuropathy, no fevers\par \par Last TCHP 11/25/22\par chemo s/e are slowly improving\par Started RT \par Has LE sweeling, s/p doppler - neg for DVT\par She reports rash on BL arms, started 2 weeks ago. Reports she had similar rash in the past ( pre chemo). Seeing derm. Using steroid cream as spot treatment\par ECHo 11/2022\par start AI after RT \par \par \par C6 day 8 today 12/2/22\par She reports moderate fatigue and altered taste x 3-4 days after chemo. She was able to function, maintained PO intake, weight stable. She had mild nausea, took Reglan, no vomiting, or mouth sores. Mild diarrhea, controlled with imodium. She has mild Bone pain, no neuropathy, no fevers. No cardio pulm sx\par Breast rash has improved with antifungal cream\par No more syncopal epsiode \par echo 7/2022, 11/2022: stable, follows with Dr Yrn DeL a Cruz \par port ok\par Continuation of HP and RT d/w her today\par Will start AI after RT\par \par 1/2023: \par Last TCHP 11/25/22\par chemo s/e are slowly improving\par Started RT \par Currently on HP q3w . Denies CP sob\par Has LE swelling, s/p doppler - neg for DVT\par She reports rash on BL arms, started 2 weeks ago. Reports she had similar rash in the past ( pre chemo). Seeing derm. Using steroid cream as spot treatment\par ECHo 11/2022\par start AI after RT \par \par 3/2023\par Noted to have 10 point drop in EF \par No CP no SOB no palpitations, no LE swelling \par Saw Dr Yrn De La Cruz and on cardiac meds \par OK to treat with HP and close monitoring \par Repeat echo in 1 m \par RT finished 2/2023\par Will start AI x 10 yrs\par \par  [de-identified] : Ms. ELLA PIÑA is a 72 year old female here for f/u appointment for ER/ ND/ HER-2/eladio positive right breast cancer dx 6/2022.  S/p lumpectomy. s/p adjuvant TCyHP on 8/12/22- 11/2022. Currently on HP x 1y. RT finished 2/2023 Anastrozole started 3/2023\par \par 4/2023\par Takes AI daily, good compliance. She denies aches/pain, hot flashes, vag dryness, excessive fatigue, GI s/e, hair loss. She is active, no change in energy, wt or appetite. \par mammogram - Dr Hua\par DEXA- 3/2023 osteopenia.  She takes ca+vit D\par She had drop in EF 2/2023, repeat 3/2023 stable EF 50%. She is f/b cardio onc\par No CP no SOB no palpitations, no LE swelling \par She is getting HP today.\par No c/o in the port area\par

## 2023-04-26 ENCOUNTER — NON-APPOINTMENT (OUTPATIENT)
Age: 73
End: 2023-04-26

## 2023-05-12 ENCOUNTER — RESULT REVIEW (OUTPATIENT)
Age: 73
End: 2023-05-12

## 2023-05-12 ENCOUNTER — APPOINTMENT (OUTPATIENT)
Dept: HEMATOLOGY ONCOLOGY | Facility: CLINIC | Age: 73
End: 2023-05-12
Payer: MEDICARE

## 2023-05-12 ENCOUNTER — APPOINTMENT (OUTPATIENT)
Dept: INFUSION THERAPY | Facility: HOSPITAL | Age: 73
End: 2023-05-12

## 2023-05-12 LAB
ALBUMIN SERPL ELPH-MCNC: 4.4 G/DL — SIGNIFICANT CHANGE UP (ref 3.3–5)
ALP SERPL-CCNC: 84 U/L — SIGNIFICANT CHANGE UP (ref 40–120)
ALT FLD-CCNC: 11 U/L — SIGNIFICANT CHANGE UP (ref 10–45)
ANION GAP SERPL CALC-SCNC: 14 MMOL/L — SIGNIFICANT CHANGE UP (ref 5–17)
AST SERPL-CCNC: 16 U/L — SIGNIFICANT CHANGE UP (ref 10–40)
BASOPHILS # BLD AUTO: 0.03 K/UL — SIGNIFICANT CHANGE UP (ref 0–0.2)
BASOPHILS NFR BLD AUTO: 0.5 % — SIGNIFICANT CHANGE UP (ref 0–2)
BILIRUB SERPL-MCNC: 0.3 MG/DL — SIGNIFICANT CHANGE UP (ref 0.2–1.2)
BUN SERPL-MCNC: 15 MG/DL — SIGNIFICANT CHANGE UP (ref 7–23)
CALCIUM SERPL-MCNC: 9.9 MG/DL — SIGNIFICANT CHANGE UP (ref 8.4–10.5)
CEA SERPL-MCNC: 6.7 NG/ML — HIGH (ref 0–3.8)
CHLORIDE SERPL-SCNC: 105 MMOL/L — SIGNIFICANT CHANGE UP (ref 96–108)
CO2 SERPL-SCNC: 21 MMOL/L — LOW (ref 22–31)
CREAT SERPL-MCNC: 0.69 MG/DL — SIGNIFICANT CHANGE UP (ref 0.5–1.3)
EGFR: 90 ML/MIN/1.73M2 — SIGNIFICANT CHANGE UP
EOSINOPHIL # BLD AUTO: 0.11 K/UL — SIGNIFICANT CHANGE UP (ref 0–0.5)
EOSINOPHIL NFR BLD AUTO: 1.9 % — SIGNIFICANT CHANGE UP (ref 0–6)
GLUCOSE SERPL-MCNC: 135 MG/DL — HIGH (ref 70–99)
HCT VFR BLD CALC: 35.1 % — SIGNIFICANT CHANGE UP (ref 34.5–45)
HGB BLD-MCNC: 11.5 G/DL — SIGNIFICANT CHANGE UP (ref 11.5–15.5)
IMM GRANULOCYTES NFR BLD AUTO: 1.2 % — HIGH (ref 0–0.9)
LYMPHOCYTES # BLD AUTO: 1.55 K/UL — SIGNIFICANT CHANGE UP (ref 1–3.3)
LYMPHOCYTES # BLD AUTO: 26.5 % — SIGNIFICANT CHANGE UP (ref 13–44)
MAGNESIUM SERPL-MCNC: 2.2 MG/DL — SIGNIFICANT CHANGE UP (ref 1.6–2.6)
MCHC RBC-ENTMCNC: 31 PG — SIGNIFICANT CHANGE UP (ref 27–34)
MCHC RBC-ENTMCNC: 32.8 G/DL — SIGNIFICANT CHANGE UP (ref 32–36)
MCV RBC AUTO: 94.6 FL — SIGNIFICANT CHANGE UP (ref 80–100)
MONOCYTES # BLD AUTO: 0.39 K/UL — SIGNIFICANT CHANGE UP (ref 0–0.9)
MONOCYTES NFR BLD AUTO: 6.7 % — SIGNIFICANT CHANGE UP (ref 2–14)
NEUTROPHILS # BLD AUTO: 3.69 K/UL — SIGNIFICANT CHANGE UP (ref 1.8–7.4)
NEUTROPHILS NFR BLD AUTO: 63.2 % — SIGNIFICANT CHANGE UP (ref 43–77)
NRBC # BLD: 0 /100 WBCS — SIGNIFICANT CHANGE UP (ref 0–0)
PLATELET # BLD AUTO: 225 K/UL — SIGNIFICANT CHANGE UP (ref 150–400)
POTASSIUM SERPL-MCNC: 4.4 MMOL/L — SIGNIFICANT CHANGE UP (ref 3.5–5.3)
POTASSIUM SERPL-SCNC: 4.4 MMOL/L — SIGNIFICANT CHANGE UP (ref 3.5–5.3)
PROT SERPL-MCNC: 6.6 G/DL — SIGNIFICANT CHANGE UP (ref 6–8.3)
RBC # BLD: 3.71 M/UL — LOW (ref 3.8–5.2)
RBC # FLD: 14.7 % — HIGH (ref 10.3–14.5)
SODIUM SERPL-SCNC: 140 MMOL/L — SIGNIFICANT CHANGE UP (ref 135–145)
WBC # BLD: 5.84 K/UL — SIGNIFICANT CHANGE UP (ref 3.8–10.5)
WBC # FLD AUTO: 5.84 K/UL — SIGNIFICANT CHANGE UP (ref 3.8–10.5)

## 2023-05-12 PROCEDURE — 99215 OFFICE O/P EST HI 40 MIN: CPT

## 2023-05-12 RX ORDER — ANASTROZOLE TABLETS 1 MG/1
1 TABLET ORAL DAILY
Qty: 1 | Refills: 1 | Status: DISCONTINUED | COMMUNITY
Start: 2023-03-10 | End: 2023-05-12

## 2023-05-15 ENCOUNTER — APPOINTMENT (OUTPATIENT)
Dept: DERMATOLOGY | Facility: CLINIC | Age: 73
End: 2023-05-15
Payer: MEDICARE

## 2023-05-15 LAB — CANCER AG27-29 SERPL-ACNC: 28.9 U/ML — SIGNIFICANT CHANGE UP (ref 0–38.6)

## 2023-05-15 PROCEDURE — 99214 OFFICE O/P EST MOD 30 MIN: CPT

## 2023-05-18 ENCOUNTER — OUTPATIENT (OUTPATIENT)
Dept: OUTPATIENT SERVICES | Facility: HOSPITAL | Age: 73
LOS: 1 days | Discharge: HOME | End: 2023-05-18

## 2023-05-18 DIAGNOSIS — Z96.641 PRESENCE OF RIGHT ARTIFICIAL HIP JOINT: Chronic | ICD-10-CM

## 2023-05-18 DIAGNOSIS — C50.919 MALIGNANT NEOPLASM OF UNSPECIFIED SITE OF UNSPECIFIED FEMALE BREAST: ICD-10-CM

## 2023-05-18 DIAGNOSIS — Z90.89 ACQUIRED ABSENCE OF OTHER ORGANS: Chronic | ICD-10-CM

## 2023-05-18 DIAGNOSIS — Z98.890 OTHER SPECIFIED POSTPROCEDURAL STATES: Chronic | ICD-10-CM

## 2023-05-19 NOTE — ASSESSMENT
[Curative] : Goals of care discussed with patient: Curative [FreeTextEntry1] : Ms. ELLA PIÑA is a 72 year old female with stage IB  ( pT2, N1a,Mx ) ER/ [place]/ HER-2/eladio positive Invasive poorly differentiated ductal carcinoma of the right breast. She is s/p lumpectomy on 6/28/22. Adjuvant TCyHP started 8/2022\par \par - Breast ca: Ms. ELLA PIÑA is S/p lumpectomy. s/p adjuvant TCyHP on 8/12/22- 11/2022\par \par 5/12/2023  \par Anastrozole on HOLD  x 2 weeks 2/2 arthralgias\par Reports pain improved with AI break, We will switch AI to exemestane\par She is getting HP #14 today, No CP no SOB no palpitations, no LE swelling .\par Continue AI daily + HP q 3 weeks to complete 1 yr,\par mammogram - Dr Hua\par She had drop in EF 2/2023, repeat 3/2023 stable EF 50%. She is f/b cardio onc \par Next ECHO scheduled 7/2023\par No CP no SOB no palpitations, no LE swelling \par No c/o in the port area\par \par - Chemo induced cardiomyopathy: Check ECHO . Continue cardiac meds\par She has cardiac history and therefore is f/b Dr Baron\par - Chemo induced anemia-  GD1 . No transfusion needed. Monitor counts\par - Chemo induced diarrhea- Continue Imodium prn. IV fluids if sx worsen\par -Osteopenia:DEXA- 3/2023 osteopenia. Concern for worsening bone density and fractures due to AI use. Rec to  continue calcium and vit D. DEXA 1-2 yrs. \par - Low Vitamin D: concern for worsening bone loss due to anastrozole and low vit D. Discussed to increase Vit D intake.\par - High cholesterol/CAD risk factors: Concern for worsening cholesterol/CAD risk factors due to anastrozole. Pt is on Pravachol. Lipid profile annually. Life style modifications d/w her.\par - Emotional support provided, all questions answered. .\par - Instructed to call office and go directly to the emergency room with fever more than 100.4, shaking chills, productive cough, sore throat, shortness of breath or urinary symptoms. Patient verbalized understanding and agreement.\par \par CHEMO, IV fluids and BW ordered in [facility]\par \par \par \par 	\par RTC 6 m\par  \par \par .\par \par \par RTO q 3 w for HP and q 9 weeks for MD\par Schedule reviewed\par

## 2023-05-19 NOTE — HISTORY OF PRESENT ILLNESS
[T: ___] : T[unfilled] [N: ___] : N[unfilled] [M: ___] : M[unfilled] [AJCC Stage: ____] : AJCC Stage: [unfilled] [3 - Distress Level] : Distress Level: 3 [de-identified] : Ms.Linda Salazar is a 72 year old female here for an evaluation of breast cancer. Her oncologic history is as follows:\par \par She underwent routine breast imaging on 4/27/22 (BIRADS 0) which showed a right breast mass calcifications for which additional imaging is rec. Additional right breast imaging \par on 5/27/22 ( BIRADS 5) showed a persistent mass with spiculation and calcifications in the posterior lower slightly outer right breast on mammo which likely corresponds to a newly sonographically seen highly suspicious irregular hypoechoic mass at 6:00 on sono for which US biopsy is rec. \par  \par She underwent  right breast 6:00, 8 cmfn lesion core biopsy on 6/9/2022 which showed Invasive poorly differentiated ductal carcinoma, (invasive mammary carcinoma,NST), [place] score 8/9, measuring 1.3 cm, ER+ 95%, PgR+ 25%,HER-2 +. Ductal carcinoma in situ, solid pattern with high grade,atypia, comedo necrosis and microcalcifications present. No lymphovascular permeation seen.\par \par She underwent a breast MRI on 6/16/22 (BIRADS 6) which showed 3.0 cm biopsy proven malignancy at the 6:00 position of the right breast with adjacent small satellite nodules. There was no MRI evidence of multicentric or contralateral disease.\par \par She underwent right breast lumpectomy on 6/28/22 which revealed Invasive poorly differentiated ductal carcinoma, [place] grad 3, measuring 22 mm,.Margin were negative, Lymphovascular invasion was not seen. High grade, cribriform, solid type DCIS, was noted.Two sentinel lymph node were removed and  one was positive for metastasis measuring 0.7 cm..\par \par PCP retired, wants a new one\par [facility],will establish cardio \par \par Started  TCHP+ onpro #1 on 8/12/22 Counts good\par Chemo expected and unexpected side effects discussed, including but not limited to hair loss, fatigue, change in taste, mouth sores, nausea, vomiting, diarrhea, infusion reaction, allergic reaction, significant myelosuppression, need for blood transfusion, infection, bleeding, cardiac toxicity, neuropathy, kidney injury, nerve pain, muscle pain and detrimental effect on liver and heart function. .\par Medication for symptom management reviewed and questions answered\par 7/26/22 ECHO LVEF 58.2%\par 07/20/2022 PET CT CAP:NICHOLAS\par EKG done , Chemo consent obtained \par 8/11/22 Left side Mediport in place, catheter tip at the distal SVC\par Patient is a anxious about stating chemo today\par Emotional support given\par \par She is s/p TCHP+ onpro on 8/12/22, she is here today 8/19/22 for IV fluids. \par She reports mild to moderate fatigue and altered taste x 3 days. She was able to function, maintained PO intake, weight stable. She had mild nausea, took Reglan, no vomiting. SHe had diarrhea, used imodium. No mouth sores. No Bone pain, no neuropathy, no fevers. No cardio pulm sx. She has an appointment and will ne f/b cardio onc  Dr Baron due to her cardiac history..\par \par S/p C2D8 today 9/9/22\par She reports mild-moderate fatigue and altered taste x 3-4 days after chemo. She was able to function, maintained PO intake, weight stable. She had mild nausea, took Reglan, no vomiting, or mouth sores. Mild diarrhea, controlled with imodium. No Bone pain, no neuropathy, no fevers\par \par Last TCHP 11/25/22\par chemo s/e are slowly improving\par Started RT \par Has LE sweeling, s/p doppler - neg for DVT\par She reports rash on BL arms, started 2 weeks ago. Reports she had similar rash in the past ( pre chemo). Seeing derm. Using steroid cream as spot treatment\par ECHo 11/2022\par start AI after RT \par \par \par C6 day 8 today 12/2/22\par She reports moderate fatigue and altered taste x 3-4 days after chemo. She was able to function, maintained PO intake, weight stable. She had mild nausea, took Reglan, no vomiting, or mouth sores. Mild diarrhea, controlled with imodium. She has mild Bone pain, no neuropathy, no fevers. No cardio pulm sx\par Breast rash has improved with antifungal cream\par No more syncopal epsiode \par echo 7/2022, 11/2022: stable, follows with Dr Yrn De La Cruz \par port ok\par Continuation of HP and RT d/w her today\par Will start AI after RT\par \par 1/2023: \par Last TCHP 11/25/22\par chemo s/e are slowly improving\par Started RT \par Currently on HP q3w . Denies CP sob\par Has LE swelling, s/p doppler - neg for DVT\par She reports rash on BL arms, started 2 weeks ago. Reports she had similar rash in the past ( pre chemo). Seeing derm. Using steroid cream as spot treatment\par ECHo 11/2022\par start AI after RT \par \par 3/2023\par Noted to have 10 point drop in EF \par No CP no SOB no palpitations, no LE swelling \par Saw Dr Yrn De La Cruz and on cardiac meds \par OK to treat with HP and close monitoring \par Repeat echo in 1 m \par RT finished 2/2023\par Will start AI x 10 yrs\par \par  [de-identified] : Ms. ELLA PIÑA is a 72 year old female here for f/u appointment for ER/ [place]/ HER-2/eladio positive right breast cancer dx 6/2022.  S/p lumpectomy. s/p adjuvant TCyHP on 8/12/22- 11/2022. Currently on HP x 1y. RT finished 2/2023 Anastrozole started 3/2023\par \par 5/12/2023\par Anastrozole on HOLD  x 2 weeks 2/2 arthralgias\par Reports pain improved with AI break, We will switch AI to exemestane\par She is getting HP #14 today, No CP no SOB no palpitations, no LE swelling .\par Takes AI daily, good compliance. She denies aches/pain, hot flashes, vag dryness, excessive fatigue, GI s/e, hair loss. She is active, no change in energy, wt or appetite. \par mammogram - Dr Hua\par DEXA- 3/31/2023 osteopenia Spine: T score -1.5,   She takes ca+vit D\par She had drop in EF 2/2023, repeat 3/28/2023 stable EF 50%. She is f/b cardio onc Next ECHO due 6/2023\par No c/o in the port area\par

## 2023-05-19 NOTE — PHYSICAL EXAM
[Restricted in physically strenuous activity but ambulatory and able to carry out work of a light or sedentary nature] : Status 1- Restricted in physically strenuous activity but ambulatory and able to carry out work of a light or sedentary nature, e.g., light house work, office work [Normal] : supple without JVD, no thyromegaly or masses appreciated [de-identified] : Left sided med port in place CDI [de-identified] :  Well healed Right breast lumpectomy scar

## 2023-05-30 ENCOUNTER — APPOINTMENT (OUTPATIENT)
Dept: ORTHOPEDIC SURGERY | Facility: CLINIC | Age: 73
End: 2023-05-30
Payer: MEDICARE

## 2023-05-30 VITALS — DIASTOLIC BLOOD PRESSURE: 77 MMHG | SYSTOLIC BLOOD PRESSURE: 126 MMHG | HEART RATE: 84 BPM

## 2023-05-30 DIAGNOSIS — Z96.641 PRESENCE OF RIGHT ARTIFICIAL HIP JOINT: ICD-10-CM

## 2023-05-30 PROCEDURE — 73502 X-RAY EXAM HIP UNI 2-3 VIEWS: CPT | Mod: RT

## 2023-05-30 PROCEDURE — 99213 OFFICE O/P EST LOW 20 MIN: CPT

## 2023-05-30 NOTE — PHYSICAL EXAM
[LE] : Sensory: Intact in bilateral lower extremities [DP] : dorsalis pedis 2+ and symmetric bilaterally [Normal RLE] : Right Lower Extremity: No scars, rashes, lesions, ulcers, skin intact [Normal Touch] : sensation intact for touch [Normal] : no peripheral adenopathy appreciated [de-identified] : On physical exam of the right hip skin is warm and dry without erythema ecchymosis signs or symptoms of infection superficial or deep.  There is no tenderness noted.  Range of motion is full.  Strength is maintained in all planes.  Sensation is intact throughout the distal lower extremity.  There are 2+ dorsalis pedis pulse.  Leg lengths appear equal measured at the medial malleolus. Negative Michelle's exam  [de-identified] : Radiographs of the pelvis and right hip were performed today. There are stable interfaces between bone and implant in the femur and acetabulum. There is stable positioning of the femoral and acetabular components. There is no fracture, foreign body, subsidement, osteolysis, or notable effusion. The lesser trochanters of each femur are aligned on Shenton's line. No heterotopic ossification is noted.\par

## 2023-05-30 NOTE — HISTORY OF PRESENT ILLNESS
[Stable] : stable [0] : a current pain level of 0/10 [Exercise Regimen] : relieved by exercise regimen [Physical Therapy] : relieved by physical therapy [Rest] : relieved by rest [de-identified] : 73-year-old female presents for yearly follow-up of the right hip status post right total hip replacement on 3/30/2022.  Overall in the postoperative period patient is doing very well.  She is able to do all of her activities of daily living without any issues.  She experiences no pain.  She is very happy with how things have progressed thus far since the surgery.  She is having any anti-inflammatories.  She is ambulating freely without any assistive devices.\par Denies any fevers chills chest pain calf pain shortness of breath

## 2023-05-30 NOTE — REASON FOR VISIT
[Follow-Up Visit] : a follow-up visit for [Hip Pain] : hip pain [Joint Replacement Surgery, Aftercare] : joint replacement surgery, aftercare [FreeTextEntry2] : S/P Right THR DOS 3/30/22

## 2023-05-30 NOTE — DISCUSSION/SUMMARY
[de-identified] : Advised patient to continue with activities within tolerances.  Encouraged her to continue good home exercise program.  She may take anti-inflammatories use ice warm moist heat and elevation as needed if pain returns.  Encouraged her to continue with routine orthopedic follow-up status post total hip replacement.  All patient's questions and concerns were addressed to satisfaction.  If any new or worsening symptoms arise advised patient to call the office.

## 2023-06-02 ENCOUNTER — APPOINTMENT (OUTPATIENT)
Dept: INFUSION THERAPY | Facility: HOSPITAL | Age: 73
End: 2023-06-02

## 2023-06-05 DIAGNOSIS — Z51.11 ENCOUNTER FOR ANTINEOPLASTIC CHEMOTHERAPY: ICD-10-CM

## 2023-06-23 ENCOUNTER — APPOINTMENT (OUTPATIENT)
Dept: INFUSION THERAPY | Facility: HOSPITAL | Age: 73
End: 2023-06-23

## 2023-06-23 ENCOUNTER — APPOINTMENT (OUTPATIENT)
Dept: HEMATOLOGY ONCOLOGY | Facility: CLINIC | Age: 73
End: 2023-06-23
Payer: MEDICARE

## 2023-06-23 DIAGNOSIS — Z79.899 OTHER LONG TERM (CURRENT) DRUG THERAPY: ICD-10-CM

## 2023-06-23 DIAGNOSIS — R79.89 OTHER SPECIFIED ABNORMAL FINDINGS OF BLOOD CHEMISTRY: ICD-10-CM

## 2023-06-23 PROCEDURE — 99214 OFFICE O/P EST MOD 30 MIN: CPT

## 2023-06-23 NOTE — PHYSICAL EXAM
[Restricted in physically strenuous activity but ambulatory and able to carry out work of a light or sedentary nature] : Status 1- Restricted in physically strenuous activity but ambulatory and able to carry out work of a light or sedentary nature, e.g., light house work, office work 18 [Normal] : affect appropriate [de-identified] : Left sided med port in place CDI [de-identified] :  Well healed Right breast lumpectomy scar

## 2023-06-23 NOTE — ASSESSMENT
[Curative] : Goals of care discussed with patient: Curative [FreeTextEntry1] : Ms. ELLA PIÑA is a 72 year old female with stage IB  ( pT2, N1a,Mx ) ER/ [place]/ HER-2/eladio positive Invasive poorly differentiated ductal carcinoma of the right breast. She is s/p lumpectomy on 6/28/22. Adjuvant TCyHP started 8/2022\par \par - Breast ca: Ms. ELLA PIÑA is S/p lumpectomy. s/p adjuvant TCyHP on 8/12/22- 11/2022\par \par 6/2023\par She was switched to exemestane 5/2023, reports bone pain is much better\par She is getting HP #16 today, No CP no SOB no palpitations, no LE swelling .\par Takes AI daily, good compliance. She denies aches/pain, hot flashes, vag dryness, excessive fatigue, GI s/e, hair loss. She is active, no change in energy, wt or appetite. \par mammogram - Dr Hua\par DEXA- 3/31/2023 osteopenia Spine: T score -1.5,   She takes ca+vit D\par She had drop in EF 2/2023, repeat 3/28/2023 stable EF 50%. She is f/b cardio onc Next ECHO due 6/2023\par No c/o in the port area, WILL REMOVE PORT after C17\par \par - Chemo induced cardiomyopathy: Check ECHO . Continue cardiac meds\par She has cardiac history and therefore is f/b Dr Baron\par - Chemo induced anemia-  GD1 . No transfusion needed. Monitor counts\par - Chemo induced diarrhea- Continue Imodium prn. IV fluids if sx worsen\par -Osteopenia:DEXA- 3/2023 osteopenia. Concern for worsening bone density and fractures due to AI use. Rec to  continue calcium and vit D. DEXA 1-2 yrs. \par - Low Vitamin D: concern for worsening bone loss due to anastrozole and low vit D. Discussed to increase Vit D intake.\par - High cholesterol/CAD risk factors: Concern for worsening cholesterol/CAD risk factors due to anastrozole. Pt is on Pravachol. Lipid profile annually. Life style modifications d/w her.\par - Emotional support provided, all questions answered. .\par - Instructed to call office and go directly to the emergency room with fever more than 100.4, shaking chills, productive cough, sore throat, shortness of breath or urinary symptoms. Patient verbalized understanding and agreement.\par \par CHEMO, IV fluids and BW ordered in [facility]\par \par RTO q 3 w for HP and q 9 weeks for MD\par Schedule reviewed\par

## 2023-06-23 NOTE — HISTORY OF PRESENT ILLNESS
[T: ___] : T[unfilled] [N: ___] : N[unfilled] [M: ___] : M[unfilled] [AJCC Stage: ____] : AJCC Stage: [unfilled] [3 - Distress Level] : Distress Level: 3 [de-identified] : Ms.Linda Salazar is a 72 year old female here for an evaluation of breast cancer. Her oncologic history is as follows:\par \par She underwent routine breast imaging on 4/27/22 (BIRADS 0) which showed a right breast mass calcifications for which additional imaging is rec. Additional right breast imaging \par on 5/27/22 ( BIRADS 5) showed a persistent mass with spiculation and calcifications in the posterior lower slightly outer right breast on mammo which likely corresponds to a newly sonographically seen highly suspicious irregular hypoechoic mass at 6:00 on sono for which US biopsy is rec. \par  \par She underwent  right breast 6:00, 8 cmfn lesion core biopsy on 6/9/2022 which showed Invasive poorly differentiated ductal carcinoma, (invasive mammary carcinoma,NST), [place] score 8/9, measuring 1.3 cm, ER+ 95%, PgR+ 25%,HER-2 +. Ductal carcinoma in situ, solid pattern with high grade,atypia, comedo necrosis and microcalcifications present. No lymphovascular permeation seen.\par \par She underwent a breast MRI on 6/16/22 (BIRADS 6) which showed 3.0 cm biopsy proven malignancy at the 6:00 position of the right breast with adjacent small satellite nodules. There was no MRI evidence of multicentric or contralateral disease.\par \par She underwent right breast lumpectomy on 6/28/22 which revealed Invasive poorly differentiated ductal carcinoma, [place] grad 3, measuring 22 mm,.Margin were negative, Lymphovascular invasion was not seen. High grade, cribriform, solid type DCIS, was noted.Two sentinel lymph node were removed and  one was positive for metastasis measuring 0.7 cm..\par \par PCP retired, wants a new one\par Cardio- [facility],will establish cardio \par \par Started  TCHP+ onpro #1 on 8/12/22 Counts good\par Chemo expected and unexpected side effects discussed, including but not limited to hair loss, fatigue, change in taste, mouth sores, nausea, vomiting, diarrhea, infusion reaction, allergic reaction, significant myelosuppression, need for blood transfusion, infection, bleeding, cardiac toxicity, neuropathy, kidney injury, nerve pain, muscle pain and detrimental effect on liver and heart function. .\par Medication for symptom management reviewed and questions answered\par 7/26/22 ECHO LVEF 58.2%\par 07/20/2022 PET CT CAP:NICHOLAS\par EKG done , Chemo consent obtained \par 8/11/22 Left side Mediport in place, catheter tip at the distal SVC\par Patient is a anxious about stating chemo today\par Emotional support given\par \par She is s/p TCHP+ onpro on 8/12/22, she is here today 8/19/22 for IV fluids. \par She reports mild to moderate fatigue and altered taste x 3 days. She was able to function, maintained PO intake, weight stable. She had mild nausea, took Reglan, no vomiting. SHe had diarrhea, used imodium. No mouth sores. No Bone pain, no neuropathy, no fevers. No cardio pulm sx. She has an appointment and will ne f/b cardio onc  Dr Baron due to her cardiac history..\par \par S/p C2D8 today 9/9/22\par She reports mild-moderate fatigue and altered taste x 3-4 days after chemo. She was able to function, maintained PO intake, weight stable. She had mild nausea, took Reglan, no vomiting, or mouth sores. Mild diarrhea, controlled with imodium. No Bone pain, no neuropathy, no fevers\par \par Last TCHP 11/25/22\par chemo s/e are slowly improving\par Started RT \par Has LE sweeling, s/p doppler - neg for DVT\par She reports rash on BL arms, started 2 weeks ago. Reports she had similar rash in the past ( pre chemo). Seeing derm. Using steroid cream as spot treatment\par ECHo 11/2022\par start AI after RT \par \par \par C6 day 8 today 12/2/22\par She reports moderate fatigue and altered taste x 3-4 days after chemo. She was able to function, maintained PO intake, weight stable. She had mild nausea, took Reglan, no vomiting, or mouth sores. Mild diarrhea, controlled with imodium. She has mild Bone pain, no neuropathy, no fevers. No cardio pulm sx\par Breast rash has improved with antifungal cream\par No more syncopal epsiode \par echo 7/2022, 11/2022: stable, follows with Dr Yrn De La Cruz \par port ok\par Continuation of HP and RT d/w her today\par Will start AI after RT\par \par 1/2023: \par Last TCHP 11/25/22\par chemo s/e are slowly improving\par Started RT \par Currently on HP q3w . Denies CP sob\par Has LE swelling, s/p doppler - neg for DVT\par She reports rash on BL arms, started 2 weeks ago. Reports she had similar rash in the past ( pre chemo). Seeing derm. Using steroid cream as spot treatment\par ECHo 11/2022\par start AI after RT \par \par 3/2023\par Noted to have 10 point drop in EF \par No CP no SOB no palpitations, no LE swelling \par Saw Dr Yrn De La Cruz and on cardiac meds \par OK to treat with HP and close monitoring \par Repeat echo in 1 m \par RT finished 2/2023\par Will start AI x 10 yrs\par \par 5/12/2023\par Anastrozole on HOLD  x 2 weeks 2/2 arthralgias\par Reports pain improved with AI break, We will switch AI to exemestane\par She is getting HP #14 today, No CP no SOB no palpitations, no LE swelling .\par Takes AI daily, good compliance. She denies aches/pain, hot flashes, vag dryness, excessive fatigue, GI s/e, hair loss. She is active, no change in energy, wt or appetite. \par mammogram - Dr Hua\par DEXA- 3/31/2023 osteopenia Spine: T score -1.5,   She takes ca+vit D\par She had drop in EF 2/2023, repeat 3/28/2023 stable EF 50%. She is f/b cardio onc Next ECHO due 6/2023\par No c/o in the port area\par  [de-identified] : Ms. ELLA PIÑA is a 72 year old female here for f/u appointment for ER/ [place]/ HER-2/eladio positive right breast cancer dx 6/2022.  S/p lumpectomy. s/p adjuvant TCyHP on 8/12/22- 11/2022. Currently on HP x 1y. RT finished 2/2023 Anastrozole started 3/2023, switched to exemestane 5/2023\par \par 6/2023\par She was switched to exemestane 5/2023, reports bone pain is much better\par She is getting HP #16 today, No CP no SOB no palpitations, no LE swelling .\par Takes AI daily, good compliance. She denies aches/pain, hot flashes, vag dryness, excessive fatigue, GI s/e, hair loss. She is active, no change in energy, wt or appetite. \par mammogram - Dr Hua\par DEXA- 3/31/2023 osteopenia Spine: T score -1.5,   She takes ca+vit D\par She had drop in EF 2/2023, repeat 3/28/2023 stable EF 50%. She is f/b cardio onc Next ECHO due 6/2023\par No c/o in the port area, WILL REMOVE PORT after C17

## 2023-06-29 ENCOUNTER — APPOINTMENT (OUTPATIENT)
Dept: INTERNAL MEDICINE | Facility: CLINIC | Age: 73
End: 2023-06-29
Payer: MEDICARE

## 2023-06-29 VITALS
BODY MASS INDEX: 27.6 KG/M2 | HEIGHT: 62 IN | OXYGEN SATURATION: 98 % | TEMPERATURE: 97.6 F | HEART RATE: 94 BPM | DIASTOLIC BLOOD PRESSURE: 75 MMHG | SYSTOLIC BLOOD PRESSURE: 135 MMHG | WEIGHT: 150 LBS

## 2023-06-29 PROCEDURE — 99213 OFFICE O/P EST LOW 20 MIN: CPT

## 2023-06-29 NOTE — ASSESSMENT
[FreeTextEntry1] : 1) HL \par '\par - ct pravastatin \par - low fat  diet \par - has had severe myalgia 2/2 potent statin , so ct pravastatin\par \par 2) CHF - 2.2 chemo\par - low salt diet \par - ct Metoprolol and the Enterersto\par - low salt diet \par - f/u cards \par

## 2023-06-29 NOTE — PHYSICAL EXAM
[No Acute Distress] : no acute distress [Normal Voice/Communication] : normal voice/communication [Normal Outer Ear/Nose] : the outer ears and nose were normal in appearance [No JVD] : no jugular venous distention [No Edema] : there was no peripheral edema [Normal] : soft, non-tender, non-distended, no masses palpated, no HSM and normal bowel sounds

## 2023-06-29 NOTE — HISTORY OF PRESENT ILLNESS
[FreeTextEntry1] : F.U HTN / HL/ CHF 2/2 chemo / Breast cancer - completing chemo next month\par following cards \par LVEF had dropped and Enterresto was added \par feels well \par no chest pain or SOB \par \par REVIEWED ONCOLOGY NOTE / ORTHO \par \par  o falls\par no joint pain \par

## 2023-07-13 ENCOUNTER — APPOINTMENT (OUTPATIENT)
Dept: CV DIAGNOSITCS | Facility: HOSPITAL | Age: 73
End: 2023-07-13
Payer: MEDICARE

## 2023-07-13 ENCOUNTER — OUTPATIENT (OUTPATIENT)
Dept: OUTPATIENT SERVICES | Facility: HOSPITAL | Age: 73
LOS: 1 days | End: 2023-07-13

## 2023-07-13 DIAGNOSIS — Z90.89 ACQUIRED ABSENCE OF OTHER ORGANS: Chronic | ICD-10-CM

## 2023-07-13 DIAGNOSIS — Z96.641 PRESENCE OF RIGHT ARTIFICIAL HIP JOINT: Chronic | ICD-10-CM

## 2023-07-13 DIAGNOSIS — I42.7 CARDIOMYOPATHY DUE TO DRUG AND EXTERNAL AGENT: ICD-10-CM

## 2023-07-13 DIAGNOSIS — Z98.890 OTHER SPECIFIED POSTPROCEDURAL STATES: Chronic | ICD-10-CM

## 2023-07-13 PROCEDURE — 93306 TTE W/DOPPLER COMPLETE: CPT | Mod: 26

## 2023-07-14 ENCOUNTER — RESULT REVIEW (OUTPATIENT)
Age: 73
End: 2023-07-14

## 2023-07-14 ENCOUNTER — APPOINTMENT (OUTPATIENT)
Dept: INFUSION THERAPY | Facility: HOSPITAL | Age: 73
End: 2023-07-14

## 2023-07-14 LAB
ALBUMIN SERPL ELPH-MCNC: 4.5 G/DL — SIGNIFICANT CHANGE UP (ref 3.3–5)
ALP SERPL-CCNC: 74 U/L — SIGNIFICANT CHANGE UP (ref 40–120)
ALT FLD-CCNC: 20 U/L — SIGNIFICANT CHANGE UP (ref 10–45)
ANION GAP SERPL CALC-SCNC: 13 MMOL/L — SIGNIFICANT CHANGE UP (ref 5–17)
AST SERPL-CCNC: 30 U/L — SIGNIFICANT CHANGE UP (ref 10–40)
BASOPHILS # BLD AUTO: 0.03 K/UL — SIGNIFICANT CHANGE UP (ref 0–0.2)
BASOPHILS NFR BLD AUTO: 0.6 % — SIGNIFICANT CHANGE UP (ref 0–2)
BILIRUB SERPL-MCNC: 0.2 MG/DL — SIGNIFICANT CHANGE UP (ref 0.2–1.2)
BUN SERPL-MCNC: 14 MG/DL — SIGNIFICANT CHANGE UP (ref 7–23)
CALCIUM SERPL-MCNC: 9.4 MG/DL — SIGNIFICANT CHANGE UP (ref 8.4–10.5)
CHLORIDE SERPL-SCNC: 104 MMOL/L — SIGNIFICANT CHANGE UP (ref 96–108)
CO2 SERPL-SCNC: 22 MMOL/L — SIGNIFICANT CHANGE UP (ref 22–31)
CREAT SERPL-MCNC: 0.76 MG/DL — SIGNIFICANT CHANGE UP (ref 0.5–1.3)
EGFR: 82 ML/MIN/1.73M2 — SIGNIFICANT CHANGE UP
EOSINOPHIL # BLD AUTO: 0.2 K/UL — SIGNIFICANT CHANGE UP (ref 0–0.5)
EOSINOPHIL NFR BLD AUTO: 4.2 % — SIGNIFICANT CHANGE UP (ref 0–6)
GLUCOSE SERPL-MCNC: 129 MG/DL — HIGH (ref 70–99)
HCT VFR BLD CALC: 35.9 % — SIGNIFICANT CHANGE UP (ref 34.5–45)
HGB BLD-MCNC: 12 G/DL — SIGNIFICANT CHANGE UP (ref 11.5–15.5)
IMM GRANULOCYTES NFR BLD AUTO: 0.2 % — SIGNIFICANT CHANGE UP (ref 0–0.9)
LYMPHOCYTES # BLD AUTO: 1.43 K/UL — SIGNIFICANT CHANGE UP (ref 1–3.3)
LYMPHOCYTES # BLD AUTO: 30 % — SIGNIFICANT CHANGE UP (ref 13–44)
MAGNESIUM SERPL-MCNC: 2.1 MG/DL — SIGNIFICANT CHANGE UP (ref 1.6–2.6)
MCHC RBC-ENTMCNC: 32.2 PG — SIGNIFICANT CHANGE UP (ref 27–34)
MCHC RBC-ENTMCNC: 33.4 G/DL — SIGNIFICANT CHANGE UP (ref 32–36)
MCV RBC AUTO: 96.2 FL — SIGNIFICANT CHANGE UP (ref 80–100)
MONOCYTES # BLD AUTO: 0.42 K/UL — SIGNIFICANT CHANGE UP (ref 0–0.9)
MONOCYTES NFR BLD AUTO: 8.8 % — SIGNIFICANT CHANGE UP (ref 2–14)
NEUTROPHILS # BLD AUTO: 2.67 K/UL — SIGNIFICANT CHANGE UP (ref 1.8–7.4)
NEUTROPHILS NFR BLD AUTO: 56.2 % — SIGNIFICANT CHANGE UP (ref 43–77)
NRBC # BLD: 0 /100 WBCS — SIGNIFICANT CHANGE UP (ref 0–0)
PLATELET # BLD AUTO: 204 K/UL — SIGNIFICANT CHANGE UP (ref 150–400)
POTASSIUM SERPL-MCNC: 4.4 MMOL/L — SIGNIFICANT CHANGE UP (ref 3.5–5.3)
POTASSIUM SERPL-SCNC: 4.4 MMOL/L — SIGNIFICANT CHANGE UP (ref 3.5–5.3)
PROT SERPL-MCNC: 6.6 G/DL — SIGNIFICANT CHANGE UP (ref 6–8.3)
RBC # BLD: 3.73 M/UL — LOW (ref 3.8–5.2)
RBC # FLD: 14.6 % — HIGH (ref 10.3–14.5)
SODIUM SERPL-SCNC: 139 MMOL/L — SIGNIFICANT CHANGE UP (ref 135–145)
WBC # BLD: 4.76 K/UL — SIGNIFICANT CHANGE UP (ref 3.8–10.5)
WBC # FLD AUTO: 4.76 K/UL — SIGNIFICANT CHANGE UP (ref 3.8–10.5)

## 2023-07-17 ENCOUNTER — APPOINTMENT (OUTPATIENT)
Dept: CARDIOLOGY | Facility: CLINIC | Age: 73
End: 2023-07-17
Payer: MEDICARE

## 2023-07-17 VITALS
BODY MASS INDEX: 28.11 KG/M2 | DIASTOLIC BLOOD PRESSURE: 75 MMHG | OXYGEN SATURATION: 97 % | HEART RATE: 68 BPM | HEIGHT: 62 IN | SYSTOLIC BLOOD PRESSURE: 128 MMHG | WEIGHT: 152.78 LBS

## 2023-07-17 PROCEDURE — 99214 OFFICE O/P EST MOD 30 MIN: CPT

## 2023-07-17 PROCEDURE — 93000 ELECTROCARDIOGRAM COMPLETE: CPT

## 2023-07-17 PROCEDURE — 93010 ELECTROCARDIOGRAM REPORT: CPT

## 2023-07-17 RX ORDER — PRAVASTATIN SODIUM 80 MG/1
80 TABLET ORAL DAILY
Qty: 90 | Refills: 3 | Status: COMPLETED | COMMUNITY
End: 2023-07-17

## 2023-07-17 NOTE — HISTORY OF PRESENT ILLNESS
[FreeTextEntry1] : Interval History:\par She completed adjuvant HP last week and will have port removed.\par Feels well. No shortness of breath, palpitations, edema, orthopnea.\par She would like to know if she may be able to discontinue Entresto.\par Most recent echo with stable LVEF and slightly improved GLS compared to prior.\par \par \par History:\par ELLA PIÑA is a 72 year old woman with a history of hypercholesterolemia diagnosed with HER2+ right breast cancer in April 2022. Over a decade prior she had a ventricular arrhythmia during an exercise treadmill stress test. She was referred for cardiac catheterization which showed no CAD. She was recommended to take metoprolol and remains on 25 XL daily since. She denies any symptoms. Does not recall why the stress test was ordered. She denies any palpitations, syncope, near syncope, or exertional dyspnea.\par \par Takes pravastatin for "very high" lipids, which her mother also had. Reports intolerance to other statins previously and mild carotid plaque noted on prior duplex sonogram, all of this over a decade or more ago with her primary care doctor who retired and has since passed on.\par \par Lives in [place].\par \par Right hip replaced March 31, 2022\par Retired , \par \par 11/18/2022:\par Nearing completion of adjuvant TC (2 more treatments remain).\par Fatigue, but no chest pain or palpitations.\par She takes pravastatin 80 mg daily.\par Reviewed lipids with her.\par \par \par 2/13/2023:\par Feels OK, but appetite is poor. No chest pain. No palpitations. Edema is gone.\par She completed radiation two weeks ago and continues HP every 3 weeks.\par Is due for follow up echo.\par \par 4/17/2023:\par There was a decline in left ventricular ejection fraction on follow up echo in February, so Entresto 24/26 BID was added. She was asymptomatic. On follow up echo one month later, LV and GLS improved slightly. \par Has continued HER2 therapy.\par Notes some dyspnea walking up an incline while carrying packages, but otherwise feels fine. No edema, orthopnea, palpitations. In general, feels better than a few months ago.\par Tolerating Entresto.\par Started anastrazole\par \par \par Cardiovascular Summary:\par ----------------------------------------------\par ECG:\par 7/17/2023: NSR 71 bpm, c/r/o anterior infarct\par 4/17/2023: NSR 75 bpm, normal ECG\par 2/13/2023: NSR 82 bpm, normal ECG\par 11/18/2022: NSR 83 bpm, normal ECG\par 8/22/2022: NSR and normal ECG\par ----------------------------------------------\par Echo:\par 7/13/2023: EF 50 %, grade 1 diastolic dysfunction, GLS -15.6 %\par 3/28/2023: EF 50 %, GLS -14.3 \par 2/23/2023: LVEF 49 %, GLS - 9.5 %\par 11/4/2022: EF 55-60 %, GLS - 19.2 %\par 7/26/2022: normal LVEF 58 % and GLS -18.5\par ----------------------------------------------\par Vascular:\par 9/16/2022 Carotid duplex: minimal heterogenous plaque\par 12/19/2022: vascular ultrasound no deep or superficial venous thrombosis bilaterally

## 2023-07-17 NOTE — ASSESSMENT
[FreeTextEntry1] : 73 year old woman with risk factors for HER2 cardiotoxicity, (age, HTN, ? arrhythmia). Baseline echo normal EF and strain and no evident structural heart disease.  We discussed the risk factors, current recommendations, and approach to cardiotoxicity monitoring during breast cancer therapy, including the complementary role of imaging by echo with global longitudinal strain, biomarkers, electrocardiogram, and clinical examination to inform shared decision making.\par \par Prior cardiology records were obtained and reviewed. She had probable rate related LBBB during exercise treadmill testing in 2018 and cardiac cath 2/9/2018 showed normal coronary arteries. \par \par # Atherosclerosis: vacular calcifications reported on staging PET/CT. Now on anti-estrogen therapy and with elevated , non- total 267 on May 13, 2023.\par - continue aspirin\par - continue Toprol 25\par - I recommend intensification of lipid lowering therapy\par - start rosuvastatin 10 mg daily now\par - DC pravastatin\par \par \par # Asymptomatic decline in LVEF during HER2 targeted therapy\par - pro-BNP 59 and troponin T normal 4/21/2023\par - continue Entresto 49/51 BID for now\par - continue Toprol XL 25 mg daily\par - repeat echo in 3 months\par - if EF improved after completion of HER2 targeted therapy, we can try stopping GDMT and monitoring\par - discussed possibility of recurrent LVEF decline\par \par \par # Hypercholesterolemia: Now on anti-estrogen therapy and with elevated , non- total 267 on May 13, 2023.\par - start rosuvastatin 10 mg daily\par - DC pravastatin\par - repeat lipid panel in 3 months\par \par \par # Pre-diabetes: A1c 6.1 %, but lost weight during treatment. \par - to repeat with upcoming bloods and consider SGLT2\par \par \par Follow up in 3 months with me and will repeat echo at this time\par \par Above recommendations discussed with the patient and all questions were answered to the best of my ability and to her apparent satisfaction.

## 2023-07-17 NOTE — REASON FOR VISIT
[FreeTextEntry3] : Dr. Llanes [FreeTextEntry1] : ELLA PIÑA is a 73 year old woman with a history of hypercholesterolemia, arrhythmia, and ER+/HER2+ right breast carcinoma seen for follow up of risk factors and treatment related cardiac dysfunction.\par \par Prior Cancer Treatments:\par ------------------------------------------------------------------------\par Chemo/targeted therapy:\par TCHP 8/12/2022-7/14/2023\par 3/2023: anastrazole\par ------------------------------------------------------------------------\par Surgery:\par Right lumpectomy 6/28/2022 \par ------------------------------------------------------------------------\par Radiation:\par 5240 cGy to right breast, completed Jan 2023

## 2023-07-21 ENCOUNTER — RESULT REVIEW (OUTPATIENT)
Age: 73
End: 2023-07-21

## 2023-07-21 ENCOUNTER — OUTPATIENT (OUTPATIENT)
Dept: OUTPATIENT SERVICES | Facility: HOSPITAL | Age: 73
LOS: 1 days | End: 2023-07-21
Payer: MEDICARE

## 2023-07-21 ENCOUNTER — TRANSCRIPTION ENCOUNTER (OUTPATIENT)
Age: 73
End: 2023-07-21

## 2023-07-21 VITALS
TEMPERATURE: 98 F | SYSTOLIC BLOOD PRESSURE: 111 MMHG | RESPIRATION RATE: 18 BRPM | HEIGHT: 62 IN | WEIGHT: 149.91 LBS | HEART RATE: 70 BPM | DIASTOLIC BLOOD PRESSURE: 50 MMHG | OXYGEN SATURATION: 94 %

## 2023-07-21 VITALS
RESPIRATION RATE: 18 BRPM | HEART RATE: 76 BPM | SYSTOLIC BLOOD PRESSURE: 117 MMHG | OXYGEN SATURATION: 95 % | DIASTOLIC BLOOD PRESSURE: 54 MMHG

## 2023-07-21 DIAGNOSIS — Z96.641 PRESENCE OF RIGHT ARTIFICIAL HIP JOINT: Chronic | ICD-10-CM

## 2023-07-21 DIAGNOSIS — Z98.890 OTHER SPECIFIED POSTPROCEDURAL STATES: Chronic | ICD-10-CM

## 2023-07-21 DIAGNOSIS — Z90.89 ACQUIRED ABSENCE OF OTHER ORGANS: Chronic | ICD-10-CM

## 2023-07-21 DIAGNOSIS — C50.919 MALIGNANT NEOPLASM OF UNSPECIFIED SITE OF UNSPECIFIED FEMALE BREAST: ICD-10-CM

## 2023-07-21 PROCEDURE — 36590 REMOVAL TUNNELED CV CATH: CPT

## 2023-07-21 RX ORDER — GABAPENTIN 400 MG/1
1 CAPSULE ORAL
Refills: 0 | DISCHARGE

## 2023-07-21 RX ORDER — ROSUVASTATIN CALCIUM 5 MG/1
1 TABLET ORAL
Refills: 0 | DISCHARGE

## 2023-07-21 RX ORDER — ASPIRIN/CALCIUM CARB/MAGNESIUM 324 MG
0 TABLET ORAL
Qty: 0 | Refills: 0 | DISCHARGE

## 2023-07-21 RX ORDER — EXEMESTANE 25 MG/1
1 TABLET, SUGAR COATED ORAL
Refills: 0 | DISCHARGE

## 2023-07-21 RX ORDER — METOPROLOL TARTRATE 50 MG
1 TABLET ORAL
Qty: 0 | Refills: 0 | DISCHARGE

## 2023-07-21 RX ORDER — SACUBITRIL AND VALSARTAN 24; 26 MG/1; MG/1
1 TABLET, FILM COATED ORAL
Refills: 0 | DISCHARGE

## 2023-07-21 NOTE — PRE-ANESTHESIA EVALUATION ADULT - WEIGHT IN KG
Render In Strict Bullet Format?: No 68 Detail Level: Zone Initiate Treatment: betamethasone dipropionate 0.05 % lotion \\nApply to  scalp three times weekly\\n\\nhydroxyzine HCl 10 mg tablet \\nTake 1 tablet by mouth at night

## 2023-07-21 NOTE — ASU DISCHARGE PLAN (ADULT/PEDIATRIC) - NURSING INSTRUCTIONS
Please feel free to contact us at [phone] if any problems arise. After 6PM, Monday through Friday, on weekends and on holidays, please call [phone] and ask for the radiology resident on call to be paged.

## 2023-07-21 NOTE — ASU DISCHARGE PLAN (ADULT/PEDIATRIC) - NS MD DC FALL RISK RISK
For information on Fall & Injury Prevention, visit: [email or web address] OR  [email or web address] OR  https://www.cdc.gov/steadi/patient.html

## 2023-07-21 NOTE — H&P ADULT - HISTORY OF PRESENT ILLNESS
Interventional Radiology    HPI: 73y Female with breast cancer s/p treatment for removal of port.     Review of Systems:   Constitutional: No fever, weight loss, or fatigue  Neurological: No headaches, memory loss, loss of strength, numbness, or tremors  Respiratory: No cough, wheezing, chills or hemoptysis; No shortness of breath  Cardiovascular: No chest pain, palpitations, dizziness, or leg swelling  Gastrointestinal: No abdominal or epigastric pain. No nausea, vomiting, or hematemesis; No diarrhea or constipation. No melena or hematochezia.  Skin: No itching, burning, rashes, or lesions   Musculoskeletal: No joint pain or swelling; No muscle, back, or extremity pain    Allergies: No Known Drug Allergies  cats ([place])    Medications (Abx/Cardiac/Anticoagulation/Blood Products)      Data:    T(C): --  HR: --  BP: --  RR: --  SpO2: --            Physical Exam  General: No acute distress, nontoxic, A&Ox3  Chest: Non labored breathing  Abdomen: Non-distended, non-tender, no preitoneal signs  Extremities: No swelling, warm, ____femoral/dp/pt pulses + ____. No pedal edema or calf tenderness notes.  Skin: No rashes or lesions    RADIOLOGY & ADDITIONAL TESTS:    Imaging Reviewed    H & P Note Reviewed from: _______    Plan: 73y Female presents for   -Risks/Benefits/alternatives explained with the patient and/or healthcare proxy and witnessed informed consent obtained.    Interventional Radiology    HPI: 73y Female with breast cancer s/p treatment for removal of port.     Review of Systems:   Constitutional: No fever, weight loss, or fatigue  Neurological: No headaches, memory loss, loss of strength, numbness, or tremors  Respiratory: No cough, wheezing, chills or hemoptysis; No shortness of breath  Cardiovascular: No chest pain, palpitations, dizziness, or leg swelling  Gastrointestinal: No abdominal or epigastric pain. No nausea, vomiting, or hematemesis; No diarrhea or constipation. No melena or hematochezia.  Skin: No itching, burning, rashes, or lesions   Musculoskeletal: No joint pain or swelling; No muscle, back, or extremity pain    Allergies: No Known Drug Allergies  cats ([place])    Medications (Abx/Cardiac/Anticoagulation/Blood Products)      Data:    T(C): --  HR: --  BP: --  RR: --  SpO2: --            Physical Exam  General: No acute distress, nontoxic, A&Ox3  Chest: Non labored breathing  Abdomen: Non-distended, non-tender, no preitoneal signs  Extremities: No swelling,   Skin: No rashes or lesions    RADIOLOGY & ADDITIONAL TESTS:    Imaging Reviewed    Plan: 73y Female with breast Ca s/p chemo presents for chest port removal.  -Risks/Benefits/alternatives explained with the patient and/or healthcare proxy and witnessed informed consent obtained.

## 2023-07-21 NOTE — ASU PATIENT PROFILE, ADULT - FALL HARM RISK - UNIVERSAL INTERVENTIONS
Bed in lowest position, wheels locked, appropriate side rails in place/Call bell, personal items and telephone in reach/Instruct patient to call for assistance before getting out of bed or chair/Non-slip footwear when patient is out of bed/[place] to call system/Physically safe environment - no spills, clutter or unnecessary equipment/Purposeful Proactive Rounding/Room/bathroom lighting operational, light cord in reach

## 2023-07-31 DIAGNOSIS — Z45.2 ENCOUNTER FOR ADJUSTMENT AND MANAGEMENT OF VASCULAR ACCESS DEVICE: ICD-10-CM

## 2023-08-03 LAB
APTT BLD: 29.9 SEC
INR PPP: 0.95 RATIO
PT BLD: 11.1 SEC

## 2023-08-14 ENCOUNTER — APPOINTMENT (OUTPATIENT)
Dept: CARDIOLOGY | Facility: CLINIC | Age: 73
End: 2023-08-14

## 2023-08-15 ENCOUNTER — APPOINTMENT (OUTPATIENT)
Dept: DERMATOLOGY | Facility: CLINIC | Age: 73
End: 2023-08-15
Payer: MEDICARE

## 2023-08-15 DIAGNOSIS — L30.9 DERMATITIS, UNSPECIFIED: ICD-10-CM

## 2023-08-15 DIAGNOSIS — L29.9 PRURITUS, UNSPECIFIED: ICD-10-CM

## 2023-08-15 PROCEDURE — 99214 OFFICE O/P EST MOD 30 MIN: CPT

## 2023-08-15 RX ORDER — BETAMETHASONE DIPROPIONATE 0.5 MG/G
0.05 OINTMENT, AUGMENTED TOPICAL
Qty: 1 | Refills: 2 | Status: ACTIVE | COMMUNITY
Start: 2023-02-06 | End: 1900-01-01

## 2023-08-19 NOTE — PHYSICAL EXAM
[Alert] : alert [Oriented x 3] : ~L oriented x 3 [Well Nourished] : well nourished [Conjunctiva Non-injected] : conjunctiva non-injected [No Visual Lymphadenopathy] : no visual  lymphadenopathy [No Clubbing] : no clubbing [No Edema] : no edema [No Bromhidrosis] : no bromhidrosis [No Chromhidrosis] : no chromhidrosis [FreeTextEntry3] : - few excoriated papules on R proximal forearm and right anterior shoulder

## 2023-08-19 NOTE — ASSESSMENT
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 Patient to obtain referral from PMD if needed.  - Stop tacrolimus and aug betamethasone as they have not helped.  - C/w sarna lotion PRN itch, can store in fridge - itch scratch cycle reviewed extensively - Increase gabapentin 300 mg BID to 300 mg TID as tolerated. SED including drowsiness. No medication interactions noted.   RTC 2-3 months.

## 2023-08-19 NOTE — HISTORY OF PRESENT ILLNESS
[de-identified] : 73-year-old w/ breast cancer s/p chemo and radiation, presenting today to follow-up on the below:  LV 5/2023  Itchy rash on b/l forearms, right>>left present for years prior to receiving chemo. Started Gabapentin 100 mg qHS with option to up-titrate to 200- 300 mg qHS if tolerating well at last visit. Patient reports taking 300 mg qAM and 300 mg qHS.  Ran out of Gabapentin recently and rash recurred during the 1-day absence. Rash has now spread to her right upper arm and shoulder.  Cycles betamethasone diprop ointment. Sarna does help. Has started using ice.  Unsure if bumps appear before itch.   PMH: stage IB ( pT2, N1a,Mx ) ER/ [place]/ HER-2/eladio positive Invasive poorly differentiated ductal carcinoma of the right breast. She is s/p lumpectomy on 6/28/22. s/p adjuvant TCyHP  8/2022-11/2022, radiation completed 2/2023, TCHP completed in July (8/12/2022-7/14/2023). Switched to Exemestane 5/2023.   Skin Cancer History: none  FH of skin cancer: none   Derm hx:   irritant derm/intertrigo under breasts which improved with keto cream and triamcinolone.    [FreeTextEntry1] : F/u: rash

## 2023-08-30 ENCOUNTER — NON-APPOINTMENT (OUTPATIENT)
Age: 73
End: 2023-08-30

## 2023-09-18 ENCOUNTER — OUTPATIENT (OUTPATIENT)
Dept: OUTPATIENT SERVICES | Facility: HOSPITAL | Age: 73
LOS: 1 days | Discharge: HOME | End: 2023-09-18

## 2023-09-18 DIAGNOSIS — Z96.641 PRESENCE OF RIGHT ARTIFICIAL HIP JOINT: Chronic | ICD-10-CM

## 2023-09-18 DIAGNOSIS — Z98.890 OTHER SPECIFIED POSTPROCEDURAL STATES: Chronic | ICD-10-CM

## 2023-09-18 DIAGNOSIS — C50.919 MALIGNANT NEOPLASM OF UNSPECIFIED SITE OF UNSPECIFIED FEMALE BREAST: ICD-10-CM

## 2023-09-18 DIAGNOSIS — Z90.89 ACQUIRED ABSENCE OF OTHER ORGANS: Chronic | ICD-10-CM

## 2023-09-22 ENCOUNTER — APPOINTMENT (OUTPATIENT)
Dept: HEMATOLOGY ONCOLOGY | Facility: CLINIC | Age: 73
End: 2023-09-22
Payer: MEDICARE

## 2023-09-22 VITALS
SYSTOLIC BLOOD PRESSURE: 122 MMHG | TEMPERATURE: 97.2 F | DIASTOLIC BLOOD PRESSURE: 66 MMHG | RESPIRATION RATE: 16 BRPM | OXYGEN SATURATION: 97 % | WEIGHT: 154.98 LBS | HEART RATE: 82 BPM | BODY MASS INDEX: 28.35 KG/M2

## 2023-09-22 PROCEDURE — 99214 OFFICE O/P EST MOD 30 MIN: CPT

## 2023-09-22 RX ORDER — METOCLOPRAMIDE 10 MG/1
10 TABLET ORAL
Qty: 120 | Refills: 0 | Status: DISCONTINUED | COMMUNITY
Start: 2022-08-11 | End: 2023-09-22

## 2023-09-22 RX ORDER — LORATADINE 10 MG/1
10 TABLET ORAL
Qty: 30 | Refills: 1 | Status: DISCONTINUED | COMMUNITY
Start: 2022-08-11 | End: 2023-09-22

## 2023-09-22 RX ORDER — LIDOCAINE AND PRILOCAINE 25; 25 MG/G; MG/G
2.5-2.5 CREAM TOPICAL
Qty: 1 | Refills: 1 | Status: DISCONTINUED | COMMUNITY
Start: 2022-08-11 | End: 2023-09-22

## 2023-10-04 ENCOUNTER — OUTPATIENT (OUTPATIENT)
Dept: OUTPATIENT SERVICES | Facility: HOSPITAL | Age: 73
LOS: 1 days | End: 2023-10-04
Payer: MEDICARE

## 2023-10-04 ENCOUNTER — APPOINTMENT (OUTPATIENT)
Dept: MRI IMAGING | Facility: IMAGING CENTER | Age: 73
End: 2023-10-04
Payer: MEDICARE

## 2023-10-04 DIAGNOSIS — Z96.641 PRESENCE OF RIGHT ARTIFICIAL HIP JOINT: Chronic | ICD-10-CM

## 2023-10-04 DIAGNOSIS — Z90.89 ACQUIRED ABSENCE OF OTHER ORGANS: Chronic | ICD-10-CM

## 2023-10-04 DIAGNOSIS — Z00.8 ENCOUNTER FOR OTHER GENERAL EXAMINATION: ICD-10-CM

## 2023-10-04 DIAGNOSIS — Z98.890 OTHER SPECIFIED POSTPROCEDURAL STATES: Chronic | ICD-10-CM

## 2023-10-04 PROCEDURE — C8937: CPT

## 2023-10-04 PROCEDURE — A9585: CPT

## 2023-10-04 PROCEDURE — C8908: CPT | Mod: MH

## 2023-10-04 PROCEDURE — 77049 MRI BREAST C-+ W/CAD BI: CPT | Mod: 26,MH

## 2023-10-13 ENCOUNTER — OUTPATIENT (OUTPATIENT)
Dept: OUTPATIENT SERVICES | Facility: HOSPITAL | Age: 73
LOS: 1 days | End: 2023-10-13

## 2023-10-13 ENCOUNTER — APPOINTMENT (OUTPATIENT)
Dept: CV DIAGNOSITCS | Facility: HOSPITAL | Age: 73
End: 2023-10-13
Payer: MEDICARE

## 2023-10-13 DIAGNOSIS — Z98.890 OTHER SPECIFIED POSTPROCEDURAL STATES: Chronic | ICD-10-CM

## 2023-10-13 DIAGNOSIS — I42.7 CARDIOMYOPATHY DUE TO DRUG AND EXTERNAL AGENT: ICD-10-CM

## 2023-10-13 DIAGNOSIS — Z90.89 ACQUIRED ABSENCE OF OTHER ORGANS: Chronic | ICD-10-CM

## 2023-10-13 DIAGNOSIS — Z96.641 PRESENCE OF RIGHT ARTIFICIAL HIP JOINT: Chronic | ICD-10-CM

## 2023-10-13 DIAGNOSIS — Z91.89 OTHER SPECIFIED PERSONAL RISK FACTORS, NOT ELSEWHERE CLASSIFIED: ICD-10-CM

## 2023-10-13 PROCEDURE — 93356 MYOCRD STRAIN IMG SPCKL TRCK: CPT

## 2023-10-13 PROCEDURE — 93306 TTE W/DOPPLER COMPLETE: CPT | Mod: 26

## 2023-10-16 ENCOUNTER — APPOINTMENT (OUTPATIENT)
Dept: CARDIOLOGY | Facility: CLINIC | Age: 73
End: 2023-10-16
Payer: MEDICARE

## 2023-10-16 VITALS
WEIGHT: 154.76 LBS | HEIGHT: 62 IN | BODY MASS INDEX: 28.48 KG/M2 | HEART RATE: 99 BPM | OXYGEN SATURATION: 98 % | DIASTOLIC BLOOD PRESSURE: 72 MMHG | SYSTOLIC BLOOD PRESSURE: 125 MMHG | TEMPERATURE: 97.2 F

## 2023-10-16 DIAGNOSIS — M89.8X9 OTHER SPECIFIED DISORDERS OF BONE, UNSPECIFIED SITE: ICD-10-CM

## 2023-10-16 DIAGNOSIS — R21 RASH AND OTHER NONSPECIFIC SKIN ERUPTION: ICD-10-CM

## 2023-10-16 DIAGNOSIS — L30.4 ERYTHEMA INTERTRIGO: ICD-10-CM

## 2023-10-16 DIAGNOSIS — Z91.89 OTHER SPECIFIED PERSONAL RISK FACTORS, NOT ELSEWHERE CLASSIFIED: ICD-10-CM

## 2023-10-16 PROCEDURE — 93010 ELECTROCARDIOGRAM REPORT: CPT | Mod: 59

## 2023-10-16 PROCEDURE — 93000 ELECTROCARDIOGRAM COMPLETE: CPT

## 2023-10-16 PROCEDURE — 99214 OFFICE O/P EST MOD 30 MIN: CPT

## 2023-10-16 RX ORDER — SACUBITRIL AND VALSARTAN 49; 51 MG/1; MG/1
49-51 TABLET, FILM COATED ORAL TWICE DAILY
Qty: 180 | Refills: 1 | Status: COMPLETED | COMMUNITY
Start: 2023-03-08 | End: 2023-10-16

## 2023-10-16 RX ORDER — ASPIRIN 81 MG
81 TABLET, DELAYED RELEASE (ENTERIC COATED) ORAL DAILY
Refills: 0 | Status: ACTIVE | COMMUNITY

## 2023-10-16 RX ORDER — ROSUVASTATIN CALCIUM 10 MG/1
10 TABLET, FILM COATED ORAL DAILY
Qty: 90 | Refills: 2 | Status: COMPLETED | COMMUNITY
Start: 2023-07-17 | End: 2023-10-16

## 2023-10-18 ENCOUNTER — APPOINTMENT (OUTPATIENT)
Dept: DERMATOLOGY | Facility: CLINIC | Age: 73
End: 2023-10-18

## 2023-10-23 ENCOUNTER — APPOINTMENT (OUTPATIENT)
Dept: HEMATOLOGY ONCOLOGY | Facility: CLINIC | Age: 73
End: 2023-10-23

## 2023-11-20 ENCOUNTER — NON-APPOINTMENT (OUTPATIENT)
Age: 73
End: 2023-11-20

## 2023-11-27 ENCOUNTER — OUTPATIENT (OUTPATIENT)
Dept: OUTPATIENT SERVICES | Facility: HOSPITAL | Age: 73
LOS: 1 days | Discharge: HOME | End: 2023-11-27

## 2023-11-27 DIAGNOSIS — C50.919 MALIGNANT NEOPLASM OF UNSPECIFIED SITE OF UNSPECIFIED FEMALE BREAST: ICD-10-CM

## 2023-11-27 DIAGNOSIS — Z96.641 PRESENCE OF RIGHT ARTIFICIAL HIP JOINT: Chronic | ICD-10-CM

## 2023-11-27 DIAGNOSIS — Z90.89 ACQUIRED ABSENCE OF OTHER ORGANS: Chronic | ICD-10-CM

## 2023-11-27 DIAGNOSIS — Z98.890 OTHER SPECIFIED POSTPROCEDURAL STATES: Chronic | ICD-10-CM

## 2023-12-13 ENCOUNTER — APPOINTMENT (OUTPATIENT)
Dept: HEMATOLOGY ONCOLOGY | Facility: CLINIC | Age: 73
End: 2023-12-13
Payer: MEDICARE

## 2023-12-13 VITALS
HEIGHT: 61.14 IN | HEART RATE: 91 BPM | SYSTOLIC BLOOD PRESSURE: 111 MMHG | DIASTOLIC BLOOD PRESSURE: 72 MMHG | TEMPERATURE: 97.8 F | BODY MASS INDEX: 29.97 KG/M2 | RESPIRATION RATE: 16 BRPM | WEIGHT: 158.73 LBS | OXYGEN SATURATION: 96 %

## 2023-12-13 PROCEDURE — 99214 OFFICE O/P EST MOD 30 MIN: CPT

## 2023-12-13 RX ORDER — GABAPENTIN 300 MG/1
300 CAPSULE ORAL
Qty: 60 | Refills: 2 | Status: ACTIVE | COMMUNITY
Start: 2023-09-03 | End: 1900-01-01

## 2023-12-14 NOTE — PHYSICAL EXAM
[Fully active, able to carry on all pre-disease performance without restriction] : Status 0 - Fully active, able to carry on all pre-disease performance without restriction [Normal] : pharynx is unremarkable, moist mucus membrane, no oral lesions [de-identified] : Left sided med port removed , well healed scar noted. [de-identified] :  Well healed Right breast lumpectomy scar

## 2023-12-14 NOTE — REASON FOR VISIT
[Follow-Up Visit] : a follow-up [Other: _____] : [unfilled] [FreeTextEntry2] : Invasive poorly differentiated ductal carcinoma of the right breast 72.8

## 2023-12-14 NOTE — ASSESSMENT
[Curative] : Goals of care discussed with patient: Curative [FreeTextEntry1] : Ms. ELLA PIÑA is a 72 year old female with stage IB ( pT2, N1a,Mx ) ER/ [place]/ HER-2/eladio positive Invasive poorly differentiated ductal carcinoma of the right breast. She is s/p lumpectomy on 6/28/22. S/p Adjuvant TCyHP star 8/2022-11/2022., Completed HP 7/2023. Anastrozole started 3/2023, switched to exemestane 5/2023  - Breast ca: Ms. ELLA PIÑA is currently on AI therapy with exemestane which was switched 5/2023 from anastrozole 2/2 arthralgias, reports  good compliance. Up to date with breast imaging - with Dr Hua 4/2023 (BIRADS 2) Completed HP 7/2023--CIPN improving, ongoing occasional tingling to feet no falls, pain or burning. continue gabapentin  other chemo s/e resolved. She has cardiac history and therefore is f/b Dr Pool-Jono She had drop in EF 2/2023, repeat 3/28/2023 stable EF 50%.  Patient was on AI HOLD x 2 weeks (1120/23- 12/13/23) 2/2  muscle pain to rib cage x  several months After starting Crestor  7/2023 c/o worsening arthralgias to ribs and legs. Crestor held and .crestor d'cd by cardiology some improvement in arthralgias. Today she reports significant improvement in pain since off both crestor and exemestane. She started Pravastatin 11/2023 tolerating well. She will restart exemestane today 12/13/23 at this time and will monitor pain.   -Osteopenia :DEXA- 3/2023 osteopenia.Spine: T score -1.5, Concern for worsening bone density and fractures due to AI use. Rec to continue calcium and vit D. DEXA 1-2 yrs. - Low Vitamin D: concern for worsening bone loss due to anastrozole and low vit D. Discussed to increase Vit D intake. - High cholesterol/CAD risk factors: Concern for worsening cholesterol/CAD risk factors due to anastrozole. Pt is on Pravachol. Lipid profile annually. Life style modifications d/w her. -  all questions answered.. -port removed 7/2023  RTO x 1 month for close s/e monitoring.

## 2023-12-14 NOTE — HISTORY OF PRESENT ILLNESS
[T: ___] : T[unfilled] [N: ___] : N[unfilled] [M: ___] : M[unfilled] [AJCC Stage: ____] : AJCC Stage: [unfilled] [3 - Distress Level] : Distress Level: 3 [de-identified] : Ms.Linda Salazar is a 72 year old female here for an evaluation of breast cancer. Her oncologic history is as follows:\par  \par  She underwent routine breast imaging on 4/27/22 (BIRADS 0) which showed a right breast mass calcifications for which additional imaging is rec. Additional right breast imaging \par  on 5/27/22 ( BIRADS 5) showed a persistent mass with spiculation and calcifications in the posterior lower slightly outer right breast on mammo which likely corresponds to a newly sonographically seen highly suspicious irregular hypoechoic mass at 6:00 on sono for which US biopsy is rec. \par   \par  She underwent  right breast 6:00, 8 cmfn lesion core biopsy on 6/9/2022 which showed Invasive poorly differentiated ductal carcinoma, (invasive mammary carcinoma,NST), Srinivasan score 8/9, measuring 1.3 cm, ER+ 95%, PgR+ 25%,HER-2 +. Ductal carcinoma in situ, solid pattern with high grade,atypia, comedo necrosis and microcalcifications present. No lymphovascular permeation seen.\par  \par  She underwent a breast MRI on 6/16/22 (BIRADS 6) which showed 3.0 cm biopsy proven malignancy at the 6:00 position of the right breast with adjacent small satellite nodules. There was no MRI evidence of multicentric or contralateral disease.\par  \par  She underwent right breast lumpectomy on 6/28/22 which revealed Invasive poorly differentiated ductal carcinoma, Srinivasan grad 3, measuring 22 mm,.Margin were negative, Lymphovascular invasion was not seen. High grade, cribriform, solid type DCIS, was noted.Two sentinel lymph node were removed and  one was positive for metastasis measuring 0.7 cm..\par  \par  PCP retired, wants a new one\par  [facility],will [facility] cardio \par  \par  Started  TCHP+ onpro #1 on 8/12/22 Counts good\par  Chemo expected and unexpected side effects discussed, including but not limited to hair loss, fatigue, change in taste, mouth sores, nausea, vomiting, diarrhea, infusion reaction, allergic reaction, significant myelosuppression, need for blood transfusion, infection, bleeding, cardiac toxicity, neuropathy, kidney injury, nerve pain, muscle pain and detrimental effect on liver and heart function. .\par  Medication for symptom management reviewed and questions answered\par  7/26/22 ECHO LVEF 58.2%\par  07/20/2022 PET CT CAP:NICHOLAS\par  EKG done , Chemo consent obtained \par  8/11/22 Left side Mediport in place, catheter tip at the distal SVC\par  Patient is a anxious about stating chemo today\par  Emotional support given\par  \par  She is s/p TCHP+ onpro on 8/12/22, she is here today 8/19/22 for IV fluids. \par  She reports mild to moderate fatigue and altered taste x 3 days. She was able to function, maintained PO intake, weight stable. She had mild nausea, took Reglan, no vomiting. SHe had diarrhea, used imodium. No mouth sores. No Bone pain, no neuropathy, no fevers. No cardio pulm sx. She has an appointment and will ne f/b cardio onc  Dr Baron due to her cardiac history..\par  \par  S/p C2D8 today 9/9/22\par  She reports mild-moderate fatigue and altered taste x 3-4 days after chemo. She was able to function, maintained PO intake, weight stable. She had mild nausea, took Reglan, no vomiting, or mouth sores. Mild diarrhea, controlled with imodium. No Bone pain, no neuropathy, no fevers\par  \par  Last TCHP 11/25/22\par  chemo s/e are slowly improving\par  Started RT \par  Has LE sweeling, s/p doppler - neg for DVT\par  She reports rash on BL arms, started 2 weeks ago. Reports she had similar rash in the past ( pre chemo). Seeing derm. Using steroid cream as spot treatment\par  ECHo 11/2022\par  start AI after RT \par  \par  \par  C6 day 8 today 12/2/22\par  She reports moderate fatigue and altered taste x 3-4 days after chemo. She was able to function, maintained PO intake, weight stable. She had mild nausea, took Reglan, no vomiting, or mouth sores. Mild diarrhea, controlled with imodium. She has mild Bone pain, no neuropathy, no fevers. No cardio pulm sx\par  Breast rash has improved with antifungal cream\par  No more syncopal epsiode \par  echo 7/2022, 11/2022: stable, follows with Dr Yrn De La Cruz \par  port ok\par  Continuation of HP and RT d/w her today\par  Will start AI after RT\par  \par  1/2023: \par  Last TCHP 11/25/22\par  chemo s/e are slowly improving\par  Started RT \par  Currently on HP q3w . Denies CP sob\par  Has LE swelling, s/p doppler - neg for DVT\par  She reports rash on BL arms, started 2 weeks ago. Reports she had similar rash in the past ( pre chemo). Seeing derm. Using steroid cream as spot treatment\par  ECHo 11/2022\par  start AI after RT \par  \par  3/2023\par  Noted to have 10 point drop in EF \par  No CP no SOB no palpitations, no LE swelling \par  Saw Dr Yrn De La Cruz and on cardiac meds \par  OK to treat with HP and close monitoring \par  Repeat echo in 1 m \par  RT finished 2/2023\par  Will start AI x 10 yrs\par  \par  5/12/2023\par  Anastrozole on HOLD  x 2 weeks 2/2 arthralgias\par  Reports pain improved with AI break, We will switch AI to exemestane\par  She is getting HP #14 today, No CP no SOB no palpitations, no LE swelling .\par  Takes AI daily, good compliance. She denies aches/pain, hot flashes, vag dryness, excessive fatigue, GI s/e, hair loss. She is active, no change in energy, wt or appetite. \par  mammogram - Dr Hua\par  DEXA- 3/31/2023 osteopenia Spine: T score -1.5,   She takes ca+vit D\par  She had drop in EF 2/2023, repeat 3/28/2023 stable EF 50%. She is f/b cardio onc Next ECHO due 6/2023\par  No c/o in the port area\par   [de-identified] : Ms. ELLA PIÑA is a 72 year old female here for f/u appointment for ER/ [place]/ HER-2/eladio positive right breast cancer dx 6/2022.  S/p lumpectomy. s/p adjuvant TCyHP on 8/12/22- 11/2022. Currently on HP x 1y. RT finished 2/2023 Anastrozole started 3/2023, switched to exemestane 5/2023 12/13/23 Patient was on AI HOLD x 2 weeks  2/2  muscle pain to rib cage x  several months Pain started after starting Crestor  7/2023. She D'c crestor use with some improvement Today she reports significant improvement in pain since off both crestor and exemestane. She started Pravastatin 11/2023 tolerating well. She is willing to restart exemestane at this time and will monitor pain  She reports ongoing CIPN which is improving but states at times she feels like she is walking on cushions or water, no falls, pain of burning. continue gabapentin RTO x 1 month for close s/e monitoring.  9/2023 She started crestor 7/2023 and has been experiencing increasing muscle pain in legs and ribs pain. She has one episode last week of crampsin her right arm and hand where arm became stiff,. Advised to HOLD crestor and will f/u with cardiology on 10/16/23. Yrn Ybarra notified. . She was switched to exemestane 5/2023, reports bone pain is much better Completed HP 7/23/23 She reports ongoing occasional tingling to feet, improving, no falls or pain advised not to wear tight fitting shoes. other chemo s/e resolved. . Takes AI daily, good compliance. She denies aches/pain, hot flashes, vag dryness, excessive fatigue, GI s/e, hair loss. She is active, no change in energy, wt or appetite.  mammogram -  with Dr Hua  4/2023 (BIRADS 2) DEXA- 3/31/2023 osteopenia Spine: T score -1.5,  She takes ca+vit D She had drop in EF 2/2023, repeat 3/28/2023 stable EF 50%. She is f/b cardio onc  No c/o in the port area, port removed 7/2023

## 2024-01-02 ENCOUNTER — NON-APPOINTMENT (OUTPATIENT)
Age: 74
End: 2024-01-02

## 2024-01-03 ENCOUNTER — APPOINTMENT (OUTPATIENT)
Dept: CV DIAGNOSITCS | Facility: HOSPITAL | Age: 74
End: 2024-01-03

## 2024-01-03 ENCOUNTER — OUTPATIENT (OUTPATIENT)
Dept: OUTPATIENT SERVICES | Facility: HOSPITAL | Age: 74
LOS: 1 days | End: 2024-01-03
Payer: MEDICARE

## 2024-01-03 DIAGNOSIS — Z96.641 PRESENCE OF RIGHT ARTIFICIAL HIP JOINT: Chronic | ICD-10-CM

## 2024-01-03 DIAGNOSIS — Z90.89 ACQUIRED ABSENCE OF OTHER ORGANS: Chronic | ICD-10-CM

## 2024-01-03 DIAGNOSIS — Z98.890 OTHER SPECIFIED POSTPROCEDURAL STATES: Chronic | ICD-10-CM

## 2024-01-03 DIAGNOSIS — I42.7 CARDIOMYOPATHY DUE TO DRUG AND EXTERNAL AGENT: ICD-10-CM

## 2024-01-03 PROCEDURE — 93356 MYOCRD STRAIN IMG SPCKL TRCK: CPT

## 2024-01-03 PROCEDURE — 93306 TTE W/DOPPLER COMPLETE: CPT | Mod: 26

## 2024-01-03 PROCEDURE — 76376 3D RENDER W/INTRP POSTPROCES: CPT | Mod: 26

## 2024-01-04 NOTE — PHYSICAL EXAM
[Normal] : well developed, well nourished, no acute distress [Normal Conjunctiva] : normal conjunctiva [Normal Venous Pressure] : normal venous pressure [No Carotid Bruit] : no carotid bruit [Normal S1, S2] : normal S1, S2 [No Murmur] : no murmur [No Rub] : no rub [No Gallop] : no gallop [Clear Lung Fields] : clear lung fields [Good Air Entry] : good air entry [No Respiratory Distress] : no respiratory distress  [Soft] : abdomen soft [Normal Gait] : normal gait [Gait - Sufficient for Exercise Testing] : gait - sufficient for exercise testing [No Edema] : no edema [No Cyanosis] : no cyanosis [No Clubbing] : no clubbing [No Varicosities] : no varicosities [No Rash] : no rash [Moves all extremities] : moves all extremities [No Focal Deficits] : no focal deficits [Normal Speech] : normal speech [Alert and Oriented] : alert and oriented [Normal memory] : normal memory

## 2024-01-05 ENCOUNTER — APPOINTMENT (OUTPATIENT)
Dept: CARDIOLOGY | Facility: CLINIC | Age: 74
End: 2024-01-05
Payer: MEDICARE

## 2024-01-05 VITALS — DIASTOLIC BLOOD PRESSURE: 82 MMHG | SYSTOLIC BLOOD PRESSURE: 130 MMHG

## 2024-01-05 VITALS
HEART RATE: 86 BPM | OXYGEN SATURATION: 97 % | HEIGHT: 61.14 IN | TEMPERATURE: 98 F | WEIGHT: 158.73 LBS | SYSTOLIC BLOOD PRESSURE: 130 MMHG | DIASTOLIC BLOOD PRESSURE: 98 MMHG | BODY MASS INDEX: 29.97 KG/M2

## 2024-01-05 DIAGNOSIS — T45.1X5A CARDIOMYOPATHY DUE TO DRUG AND EXTERNAL AGENT: ICD-10-CM

## 2024-01-05 DIAGNOSIS — Z79.811 LONG TERM (CURRENT) USE OF AROMATASE INHIBITORS: ICD-10-CM

## 2024-01-05 DIAGNOSIS — M79.89 OTHER SPECIFIED SOFT TISSUE DISORDERS: ICD-10-CM

## 2024-01-05 DIAGNOSIS — I42.7 CARDIOMYOPATHY DUE TO DRUG AND EXTERNAL AGENT: ICD-10-CM

## 2024-01-05 DIAGNOSIS — Z86.79 PERSONAL HISTORY OF OTHER DISEASES OF THE CIRCULATORY SYSTEM: ICD-10-CM

## 2024-01-05 LAB
ALBUMIN SERPL ELPH-MCNC: 5.1 G/DL
ALP BLD-CCNC: 81 U/L
ALT SERPL-CCNC: 21 U/L
ANION GAP SERPL CALC-SCNC: 16 MMOL/L
AST SERPL-CCNC: 22 U/L
BILIRUB SERPL-MCNC: 0.3 MG/DL
BUN SERPL-MCNC: 15 MG/DL
CALCIUM SERPL-MCNC: 10.6 MG/DL
CHLORIDE SERPL-SCNC: 103 MMOL/L
CHOLEST SERPL-MCNC: 276 MG/DL
CO2 SERPL-SCNC: 24 MMOL/L
CREAT SERPL-MCNC: 0.91 MG/DL
EGFR: 66 ML/MIN/1.73M2
ESTIMATED AVERAGE GLUCOSE: 128 MG/DL
GLUCOSE SERPL-MCNC: 101 MG/DL
HBA1C MFR BLD HPLC: 6.1 %
HDLC SERPL-MCNC: 70 MG/DL
LDLC SERPL CALC-MCNC: 155 MG/DL
NONHDLC SERPL-MCNC: 206 MG/DL
NT-PROBNP SERPL-MCNC: 57 PG/ML
POTASSIUM SERPL-SCNC: 5.3 MMOL/L
PROT SERPL-MCNC: 7.6 G/DL
SODIUM SERPL-SCNC: 144 MMOL/L
TRIGL SERPL-MCNC: 275 MG/DL

## 2024-01-05 PROCEDURE — 93010 ELECTROCARDIOGRAM REPORT: CPT

## 2024-01-05 PROCEDURE — 93000 ELECTROCARDIOGRAM COMPLETE: CPT

## 2024-01-05 PROCEDURE — 99215 OFFICE O/P EST HI 40 MIN: CPT

## 2024-01-05 NOTE — ASSESSMENT
[FreeTextEntry1] : ---------------------------------------- 73 year old woman with risk factors for HER2 cardiotoxicity, (age, HTN, ? arrhythmia). Baseline echo normal EF and strain and no evident structural heart disease. We discussed the risk factors, current recommendations, and approach to cardiotoxicity monitoring during breast cancer therapy, including the complementary role of imaging by echo with global longitudinal strain, biomarkers, electrocardiogram, and clinical examination to inform shared decision making.  Prior cardiology records were obtained and reviewed. She had probable rate related LBBB during exercise treadmill testing in 2018 and cardiac cath 2/9/2018 showed normal coronary arteries.  Had LVEF decline during adjuvant HER2 targeted therapy, which remained stable on GDMT. After completing adjuvant HER2 therapy, LVEF returned to normal. She wanted to stop Entresto. Follow up echo done after stopping Entresto showed normal (and stable) LV function. GLS was abnormal, and reduced from pre-treatment baseline. But this may be seen following adjuvant therapy in the absence of LVEF decline.   # Atherosclerosis: vascular calcifications reported on staging PET/CT. Now on anti-estrogen therapy and with elevated , non- total 267 on May 13, 2023. - continue aspirin - continue Toprol 25 - I recommend intensification of lipid lowering therapy, but she was unable to tolerate rosuvastatin - continue pravastatin 80 mg daily for now per patient preference - repeat lipid panel today after 3 months on prava - if still elevated, will discuss possible PCSK9 inhibitor  # Cardiomyopathy with recovered EF due to HER2 targeted therapy:  - pro-BNP 59 and troponin T normal 4/21/2023 - Toprol XL 25 mg daily - Entresto stopped 10/2023 per patient preference - discussed possibility of recurrent LVEF decline - follow up echo done January 2024 with normal LVEF (abnormal GLS), to continue surveillance - will repeat an echo in 6 months  # Hypercholesterolemia: Now on anti-estrogen therapy and with elevated , non- total 267 on May 13, 2023. Unable to tolerate lipophilic statin. We discussed PCSK9, but she preferred to wait until repeating lipids on pravastatin) - pravastatin 80 mg daily - repeat lipids today  # Pre-diabetes: A1c 6.1 %, lost weight during treatment, but gained some back.  - to repeat A1c today  Follow up in 6 months with me  Above recommendations discussed with the patient and all questions were answered to the best of my ability and to her apparent satisfaction.

## 2024-01-05 NOTE — REASON FOR VISIT
[FreeTextEntry3] : Dr. Llanes [FreeTextEntry1] : ---------------------------------------------------------------------- ELLA PIÑA is a 73 year old woman with a history of hypercholesterolemia, arrhythmia, and ER+/HER2+ right breast carcinoma seen for follow up of risk factors and treatment related cardiac dysfunction.  Prior Cancer Treatments: ------------------------------------------------------------------------ Chemo/targeted therapy: [facility] 8/12/2022-7/14/2023 3/2023: anastrozole ------------------------------------------------------------------------ Surgery: 6/28/2022: Right lumpectomy ------------------------------------------------------------------------ Radiation: 5240 cGy to right breast, completed Jan 2023

## 2024-01-05 NOTE — HISTORY OF PRESENT ILLNESS
[FreeTextEntry1] : Interval History: Feels well. Follow up echo done after stopping Entresto showed normal (and stable) LV function. GLS was abnormal, and reduced from pre-treatment baseline. We reviewed echo results today. She does get short of breath when climbing stairs at times, no change from before. No chest pain. No edema.  History: ELLA PIÑA is a 72 year old woman with a history of hypercholesterolemia diagnosed with HER2+ right breast cancer in 2022. Over a decade prior she had a ventricular arrhythmia during an exercise treadmill stress test. She was referred for cardiac catheterization which showed no CAD. She was recommended to take metoprolol and remains on 25 XL daily since. She denies any symptoms. Does not recall why the stress test was ordered. She denies any palpitations, syncope, near syncope, or exertional dyspnea.  Takes pravastatin for "very high" lipids, which her mother also had. Reports intolerance to other statins previously and mild carotid plaque noted on prior duplex sonogram, all of this over a decade or more ago with her primary care doctor who retired and has since passed on.  Lives in [place].  Right hip replaced 2022 Retired ,   2022: Nearing completion of adjuvant TC (2 more treatments remain). Fatigue, but no chest pain or palpitations. She takes pravastatin 80 mg daily. Reviewed lipids with her.  2023: Feels OK, but appetite is poor. No chest pain. No palpitations. Edema is gone. She completed radiation two weeks ago and continues HP every 3 weeks. Is due for follow up echo.  2023: There was a decline in left ventricular ejection fraction on follow up echo in February, so Entresto 24/26 BID was added. She was asymptomatic. On follow up echo one month later, LV and GLS improved slightly.  Has continued HER2 therapy. Notes some dyspnea walking up an incline while carrying packages, but otherwise feels fine. No edema, orthopnea, palpitations. In general, feels better than a few months ago. Tolerating Entresto. Started anostrozole  2023: She completed adjuvant HP last week and will have port removed. Feels well. No shortness of breath, palpitations, edema, orthopnea. She would like to know if she may be able to discontinue Entresto. Most recent echo with stable LVEF and slightly improved GLS compared to prior.  10/16/2023: Feels well. She had hand cramps, neck cramps, and abdominal pain while taking rosuvastatin, which were the same as she experienced in the past. She stopped rosuvastatin and these went away. She would like to go back on Pravastatin. Echo completed 10/13 showed normal LVEF, improved from prior study to 60 %.   Cardiovascular Summary: ---------------------------------------------- EC2024: NSR 80 bpm normal ECG 10/16/2023: NSR, 99 bpm, normal ECG 2023: NSR 71 bpm, c/r/o anterior infarct 2023: NSR 75 bpm, normal ECG 2023: NSR 82 bpm, normal ECG 2022: NSR 83 bpm, normal ECG 2022: NSR and normal ECG ---------------------------------------------- Echo: 1/3/2024: EF 60 %, GLS - 13.3 10/13/2023: EF 60 %, grade 1 diastolic dysfunction  2023: EF 50 %, grade 1 diastolic dysfunction, GLS -15.6 % 3/28/2023: EF 50 %, GLS -14.3  2023: LVEF 49 %, GLS - 9.5 % 2022: EF 55-60 %, GLS - 19.2 % 2022: normal LVEF 58 % and GLS -18.5 ---------------------------------------------- Vascular: 2022 Carotid duplex: minimal heterogenous plaque 2022: vascular ultrasound no deep or superficial venous thrombosis bilaterally

## 2024-01-08 ENCOUNTER — APPOINTMENT (OUTPATIENT)
Dept: SURGERY | Facility: CLINIC | Age: 74
End: 2024-01-08
Payer: MEDICARE

## 2024-01-08 PROCEDURE — 99213K: CUSTOM

## 2024-01-24 ENCOUNTER — APPOINTMENT (OUTPATIENT)
Dept: HEMATOLOGY ONCOLOGY | Facility: CLINIC | Age: 74
End: 2024-01-24
Payer: MEDICARE

## 2024-01-24 VITALS
SYSTOLIC BLOOD PRESSURE: 110 MMHG | TEMPERATURE: 97.2 F | HEART RATE: 85 BPM | WEIGHT: 158.73 LBS | OXYGEN SATURATION: 97 % | BODY MASS INDEX: 29.85 KG/M2 | DIASTOLIC BLOOD PRESSURE: 67 MMHG | RESPIRATION RATE: 16 BRPM

## 2024-01-24 DIAGNOSIS — T45.1X5A DRUG-INDUCED POLYNEUROPATHY: ICD-10-CM

## 2024-01-24 DIAGNOSIS — T45.1X5A PAIN IN UNSPECIFIED JOINT: ICD-10-CM

## 2024-01-24 DIAGNOSIS — C50.919 MALIGNANT NEOPLASM OF UNSPECIFIED SITE OF UNSPECIFIED FEMALE BREAST: ICD-10-CM

## 2024-01-24 DIAGNOSIS — G62.0 DRUG-INDUCED POLYNEUROPATHY: ICD-10-CM

## 2024-01-24 DIAGNOSIS — M85.80 OTHER SPECIFIED DISORDERS OF BONE DENSITY AND STRUCTURE, UNSPECIFIED SITE: ICD-10-CM

## 2024-01-24 DIAGNOSIS — M25.50 PAIN IN UNSPECIFIED JOINT: ICD-10-CM

## 2024-01-24 PROCEDURE — 99214 OFFICE O/P EST MOD 30 MIN: CPT

## 2024-01-24 RX ORDER — GABAPENTIN 300 MG/1
300 CAPSULE ORAL
Qty: 3 | Refills: 2 | Status: ACTIVE | COMMUNITY
Start: 2023-05-15 | End: 1900-01-01

## 2024-01-24 RX ORDER — EXEMESTANE 25 MG/1
25 TABLET, FILM COATED ORAL
Qty: 1 | Refills: 1 | Status: ACTIVE | COMMUNITY
Start: 2023-05-12 | End: 1900-01-01

## 2024-01-24 NOTE — HISTORY OF PRESENT ILLNESS
[T: ___] : T[unfilled] [N: ___] : N[unfilled] [M: ___] : M[unfilled] [AJCC Stage: ____] : AJCC Stage: [unfilled] [3 - Distress Level] : Distress Level: 3 [de-identified] : Ms.Linda Salazar is a 72 year old female here for an evaluation of breast cancer. Her oncologic history is as follows:\par  \par  She underwent routine breast imaging on 4/27/22 (BIRADS 0) which showed a right breast mass calcifications for which additional imaging is rec. Additional right breast imaging \par  on 5/27/22 ( BIRADS 5) showed a persistent mass with spiculation and calcifications in the posterior lower slightly outer right breast on mammo which likely corresponds to a newly sonographically seen highly suspicious irregular hypoechoic mass at 6:00 on sono for which US biopsy is rec. \par   \par  She underwent  right breast 6:00, 8 cmfn lesion core biopsy on 6/9/2022 which showed Invasive poorly differentiated ductal carcinoma, (invasive mammary carcinoma,NST), Srinivasan score 8/9, measuring 1.3 cm, ER+ 95%, PgR+ 25%,HER-2 +. Ductal carcinoma in situ, solid pattern with high grade,atypia, comedo necrosis and microcalcifications present. No lymphovascular permeation seen.\par  \par  She underwent a breast MRI on 6/16/22 (BIRADS 6) which showed 3.0 cm biopsy proven malignancy at the 6:00 position of the right breast with adjacent small satellite nodules. There was no MRI evidence of multicentric or contralateral disease.\par  \par  She underwent right breast lumpectomy on 6/28/22 which revealed Invasive poorly differentiated ductal carcinoma, Srinivasan grad 3, measuring 22 mm,.Margin were negative, Lymphovascular invasion was not seen. High grade, cribriform, solid type DCIS, was noted.Two sentinel lymph node were removed and  one was positive for metastasis measuring 0.7 cm..\par  \par  PCP retired, wants a new one\par  [facility],will establish cardio \par  \par  Started  TCHP+ onpro #1 on 8/12/22 Counts good\par  Chemo expected and unexpected side effects discussed, including but not limited to hair loss, fatigue, change in taste, mouth sores, nausea, vomiting, diarrhea, infusion reaction, allergic reaction, significant myelosuppression, need for blood transfusion, infection, bleeding, cardiac toxicity, neuropathy, kidney injury, nerve pain, muscle pain and detrimental effect on liver and heart function. .\par  Medication for symptom management reviewed and questions answered\par  7/26/22 ECHO LVEF 58.2%\par  07/20/2022 PET CT CAP:NICHOLAS\par  EKG done , Chemo consent obtained \par  8/11/22 Left side Mediport in place, catheter tip at the distal SVC\par  Patient is a anxious about stating chemo today\par  Emotional support given\par  \par  She is s/p TCHP+ onpro on 8/12/22, she is here today 8/19/22 for IV fluids. \par  She reports mild to moderate fatigue and altered taste x 3 days. She was able to function, maintained PO intake, weight stable. She had mild nausea, took Reglan, no vomiting. SHe had diarrhea, used imodium. No mouth sores. No Bone pain, no neuropathy, no fevers. No cardio pulm sx. She has an appointment and will ne f/b cardio onc  Dr Baron due to her cardiac history..\par  \par  S/p C2D8 today 9/9/22\par  She reports mild-moderate fatigue and altered taste x 3-4 days after chemo. She was able to function, maintained PO intake, weight stable. She had mild nausea, took Reglan, no vomiting, or mouth sores. Mild diarrhea, controlled with imodium. No Bone pain, no neuropathy, no fevers\par  \par  Last TCHP 11/25/22\par  chemo s/e are slowly improving\par  Started RT \par  Has LE sweeling, s/p doppler - neg for DVT\par  She reports rash on BL arms, started 2 weeks ago. Reports she had similar rash in the past ( pre chemo). Seeing derm. Using steroid cream as spot treatment\par  ECHo 11/2022\par  start AI after RT \par  \par  \par  C6 day 8 today 12/2/22\par  She reports moderate fatigue and altered taste x 3-4 days after chemo. She was able to function, maintained PO intake, weight stable. She had mild nausea, took Reglan, no vomiting, or mouth sores. Mild diarrhea, controlled with imodium. She has mild Bone pain, no neuropathy, no fevers. No cardio pulm sx\par  Breast rash has improved with antifungal cream\par  No more syncopal epsiode \par  echo 7/2022, 11/2022: stable, follows with Dr Yrn De La Cruz \par  port ok\par  Continuation of HP and RT d/w her today\par  Will start AI after RT\par  \par  1/2023: \par  Last TCHP 11/25/22\par  chemo s/e are slowly improving\par  Started RT \par  Currently on HP q3w . Denies CP sob\par  Has LE swelling, s/p doppler - neg for DVT\par  She reports rash on BL arms, started 2 weeks ago. Reports she had similar rash in the past ( pre chemo). Seeing derm. Using steroid cream as spot treatment\par  ECHo 11/2022\par  start AI after RT \par  \par  3/2023\par  Noted to have 10 point drop in EF \par  No CP no SOB no palpitations, no LE swelling \par  Saw Dr Yrn De La Cruz and on cardiac meds \par  OK to treat with HP and close monitoring \par  Repeat echo in 1 m \par  RT finished 2/2023\par  Will start AI x 10 yrs\par  \par  5/12/2023\par  Anastrozole on HOLD  x 2 weeks 2/2 arthralgias\par  Reports pain improved with AI break, We will switch AI to exemestane\par  She is getting HP #14 today, No CP no SOB no palpitations, no LE swelling .\par  Takes AI daily, good compliance. She denies aches/pain, hot flashes, vag dryness, excessive fatigue, GI s/e, hair loss. She is active, no change in energy, wt or appetite. \par  mammogram - Dr Hua\par  DEXA- 3/31/2023 osteopenia Spine: T score -1.5,   She takes ca+vit D\par  She had drop in EF 2/2023, repeat 3/28/2023 stable EF 50%. She is f/b cardio onc Next ECHO due 6/2023\par  No c/o in the port area\par   [de-identified] : Ms. ELLA PIÑA is a 72 year old female here for f/u appointment for ER/ [place]/ HER-2/eladio positive right breast cancer dx 6/2022.  S/p lumpectomy. s/p adjuvant TCyHP on 8/12/22- 11/2022. Currently on HP x 1y. RT finished 2/2023 Anastrozole started 3/2023, switched to exemestane 5/2023 1/24/24 Takes exemestane daily, good compliance. She denies aches/pain, hot flashes, vag dryness, excessive fatigue, GI s/e, hair loss. She is active, no change in energy, wt or appetite.  mammogram -  with Dr Hua  4/2023 (BIRADS 2) DEXA- 3/31/2023 osteopenia Spine: T score -1.5,  She takes ca+vit D She had drop in EF 2/2023, repeat 3/28/2023 stable EF 50%. She is f/b cardio onc  She reports ongoing CIPN which is stable. She continues gabapentin

## 2024-01-24 NOTE — ASSESSMENT
[Curative] : Goals of care discussed with patient: Curative [FreeTextEntry1] : Ms. ELLA PIÑA is a 72 year old female with stage IB ( pT2, N1a,Mx ) ER/ [place]/ HER-2/eladio positive Invasive poorly differentiated ductal carcinoma of the right breast. She is s/p lumpectomy on 6/28/22. S/p Adjuvant TCyHP star 8/2022-11/2022., Completed HP 7/2023. Anastrozole started 3/2023, switched to exemestane 5/2023  - Breast ca: Ms. ELLA PIÑA is currently on AI therapy with exemestane which was switched 5/2023 from anastrozole 2/2 arthralgias, reports  good compliance. Up to date with breast imaging - with Dr Hua 4/2023 (BIRADS 2). Has appt this April. Completed HP 7/2023--CIPN improving, ongoing occasional tingling to feet no falls, pain or burning. continue gabapentin other chemo s/e resolved. She has cardiac history and therefore is f/b Dr Pool-Jono  -Osteopenia :DEXA- 3/2023 osteopenia.Spine: T score -1.5, Concern for worsening bone density and fractures due to AI use. Rec to continue calcium and vit D. DEXA 1-2 yrs. DEXA 2025 - Low Vitamin D: concern for worsening bone loss due to anastrozole and low vit D. Discussed to increase Vit D intake. - High cholesterol/CAD risk factors: Concern for worsening cholesterol/CAD risk factors due to anastrozole. Pt is on Pravachol. Lipid profile annually. Life style modifications d/w her. - all questions answered. -port removed 7/2023  RTO x 6 m

## 2024-01-24 NOTE — PHYSICAL EXAM
[Fully active, able to carry on all pre-disease performance without restriction] : Status 0 - Fully active, able to carry on all pre-disease performance without restriction [Normal] : affect appropriate [de-identified] : Left sided med port removed , well healed scar noted. [de-identified] :  Well healed Right breast lumpectomy scar

## 2024-02-16 RX ORDER — PRAVASTATIN SODIUM 80 MG/1
80 TABLET ORAL DAILY
Qty: 90 | Refills: 3 | Status: ACTIVE | COMMUNITY
Start: 1900-01-01 | End: 1900-01-01

## 2024-03-11 RX ORDER — METOPROLOL SUCCINATE 25 MG/1
25 TABLET, EXTENDED RELEASE ORAL DAILY
Qty: 90 | Refills: 3 | Status: ACTIVE | COMMUNITY
Start: 1900-01-01 | End: 1900-01-01

## 2024-04-17 ENCOUNTER — APPOINTMENT (OUTPATIENT)
Dept: INTERNAL MEDICINE | Facility: CLINIC | Age: 74
End: 2024-04-17
Payer: MEDICARE

## 2024-04-17 VITALS
HEIGHT: 61.14 IN | HEART RATE: 84 BPM | RESPIRATION RATE: 16 BRPM | DIASTOLIC BLOOD PRESSURE: 72 MMHG | WEIGHT: 160 LBS | BODY MASS INDEX: 30.21 KG/M2 | TEMPERATURE: 97.8 F | SYSTOLIC BLOOD PRESSURE: 128 MMHG | OXYGEN SATURATION: 98 %

## 2024-04-17 DIAGNOSIS — Z00.00 ENCOUNTER FOR GENERAL ADULT MEDICAL EXAMINATION W/OUT ABNORMAL FINDINGS: ICD-10-CM

## 2024-04-17 DIAGNOSIS — E78.00 PURE HYPERCHOLESTEROLEMIA, UNSPECIFIED: ICD-10-CM

## 2024-04-17 PROCEDURE — 36415 COLL VENOUS BLD VENIPUNCTURE: CPT

## 2024-04-17 PROCEDURE — G0439: CPT

## 2024-04-17 NOTE — ASSESSMENT
[FreeTextEntry1] : 1) Wellness check  - diet / exercise discussed - home safety discussed - labs -reviewed TSH ordered -refuses to take vaccines  - optho adv - mammo- UTD  -BMD- UTD  - CRC screen UTD

## 2024-04-17 NOTE — HEALTH RISK ASSESSMENT
[Good] : ~his/her~ current health as good [Very Good] : ~his/her~  mood as very good [Yes] : Yes [Monthly or less (1 pt)] : Monthly or less (1 point) [Little interest or pleasure doing things] : 1) Little interest or pleasure doing things [Feeling down, depressed, or hopeless] : 2) Feeling down, depressed, or hopeless [0] : 2) Feeling down, depressed, or hopeless: Not at all (0) [PHQ-2 Negative - No further assessment needed] : PHQ-2 Negative - No further assessment needed [de-identified] : walks daily  [de-identified] : low salt  [[id number]] : 04/23 [Patient reported mammogram was normal] : Patient reported mammogram was normal [Patient reported bone density results were abnormal] : Patient reported bone density results were abnormal [Change in mental status noted] : No change in mental status noted [None] : None [Alone] : lives alone [Retired] : retired [Single] : single [Sexually Active] : not sexually active [Feels Safe at Home] : Feels safe at home [Fully functional (bathing, dressing, toileting, transferring, walking, feeding)] : Fully functional (bathing, dressing, toileting, transferring, walking, feeding) [Fully functional (using the telephone, shopping, preparing meals, housekeeping, doing laundry, using] : Fully functional and needs no help or supervision to perform IADLs (using the telephone, shopping, preparing meals, housekeeping, doing laundry, using transportation, managing medications and managing finances) [Reports changes in hearing] : Reports no changes in hearing [Reports changes in vision] : Reports no changes in vision [Reports changes in dental health] : Reports no changes in dental health [Smoke Detector] : smoke detector [Carbon Monoxide Detector] : carbon monoxide detector [Safety elements used in home] : safety elements used in home [Seat Belt] :  uses seat belt [MammogramDate] : 4/23 [BoneDensityDate] : 2023 [BoneDensityComments] : O/penia [ColonoscopyDate] : 5/22 [Name: ___] : Health Care Proxy's Name: [unfilled]  [Relationship: ___] : Relationship: [unfilled] [AdvancecareDate] : 4/17/24

## 2024-04-17 NOTE — PHYSICAL EXAM
[No Acute Distress] : no acute distress [Well Nourished] : well nourished [Normal Voice/Communication] : normal voice/communication [Normal Sclera/Conjunctiva] : normal sclera/conjunctiva [Normal Outer Ear/Nose] : the outer ears and nose were normal in appearance [No Edema] : there was no peripheral edema [Normal] : normal gait, coordination grossly intact, no focal deficits and deep tendon reflexes were 2+ and symmetric

## 2024-04-18 LAB — TSH SERPL-ACNC: 2.03 UIU/ML

## 2024-04-19 ENCOUNTER — APPOINTMENT (OUTPATIENT)
Dept: MAMMOGRAPHY | Facility: IMAGING CENTER | Age: 74
End: 2024-04-19
Payer: MEDICARE

## 2024-04-19 ENCOUNTER — APPOINTMENT (OUTPATIENT)
Dept: ULTRASOUND IMAGING | Facility: IMAGING CENTER | Age: 74
End: 2024-04-19
Payer: MEDICARE

## 2024-04-19 ENCOUNTER — OUTPATIENT (OUTPATIENT)
Dept: OUTPATIENT SERVICES | Facility: HOSPITAL | Age: 74
LOS: 1 days | End: 2024-04-19
Payer: MEDICARE

## 2024-04-19 DIAGNOSIS — Z90.89 ACQUIRED ABSENCE OF OTHER ORGANS: Chronic | ICD-10-CM

## 2024-04-19 DIAGNOSIS — Z98.890 OTHER SPECIFIED POSTPROCEDURAL STATES: Chronic | ICD-10-CM

## 2024-04-19 DIAGNOSIS — Z96.641 PRESENCE OF RIGHT ARTIFICIAL HIP JOINT: Chronic | ICD-10-CM

## 2024-04-19 DIAGNOSIS — Z00.8 ENCOUNTER FOR OTHER GENERAL EXAMINATION: ICD-10-CM

## 2024-04-19 PROCEDURE — 76641 ULTRASOUND BREAST COMPLETE: CPT | Mod: 26,50,GY

## 2024-04-19 PROCEDURE — 77066 DX MAMMO INCL CAD BI: CPT | Mod: 26

## 2024-04-19 PROCEDURE — 76641 ULTRASOUND BREAST COMPLETE: CPT

## 2024-04-19 PROCEDURE — G0279: CPT | Mod: 26

## 2024-04-19 PROCEDURE — G0279: CPT

## 2024-04-19 PROCEDURE — 77066 DX MAMMO INCL CAD BI: CPT

## 2024-05-07 NOTE — REASON FOR VISIT
None [FreeTextEntry3] : Dr. Llanes [FreeTextEntry1] : ELLA PIÑA is a 72 year woman with a history of hypercholesterolemia, arrhythmia, and HER2+ right breast cancer here for follow up.\par \par Prior Cancer Treatments:\par ------------------------------------------------------------------------\par Chemo/targeted therapy:\par [facility] 8/12/2022-\par ------------------------------------------------------------------------\par Surgery:\par Right lumpectomy 6/28/2022 \par ------------------------------------------------------------------------\par Radiation:

## 2024-07-08 ENCOUNTER — OUTPATIENT (OUTPATIENT)
Dept: OUTPATIENT SERVICES | Facility: HOSPITAL | Age: 74
LOS: 1 days | Discharge: HOME | End: 2024-07-08

## 2024-07-08 ENCOUNTER — APPOINTMENT (OUTPATIENT)
Dept: OBGYN | Facility: CLINIC | Age: 74
End: 2024-07-08
Payer: MEDICARE

## 2024-07-08 ENCOUNTER — APPOINTMENT (OUTPATIENT)
Dept: CARDIOLOGY | Facility: CLINIC | Age: 74
End: 2024-07-08

## 2024-07-08 VITALS
DIASTOLIC BLOOD PRESSURE: 64 MMHG | BODY MASS INDEX: 31.41 KG/M2 | HEIGHT: 60 IN | SYSTOLIC BLOOD PRESSURE: 115 MMHG | WEIGHT: 160 LBS

## 2024-07-08 DIAGNOSIS — Z98.890 OTHER SPECIFIED POSTPROCEDURAL STATES: Chronic | ICD-10-CM

## 2024-07-08 DIAGNOSIS — Z90.89 ACQUIRED ABSENCE OF OTHER ORGANS: Chronic | ICD-10-CM

## 2024-07-08 DIAGNOSIS — Z96.641 PRESENCE OF RIGHT ARTIFICIAL HIP JOINT: Chronic | ICD-10-CM

## 2024-07-08 DIAGNOSIS — C50.919 MALIGNANT NEOPLASM OF UNSPECIFIED SITE OF UNSPECIFIED FEMALE BREAST: ICD-10-CM

## 2024-07-08 PROCEDURE — G0101: CPT

## 2024-07-09 ENCOUNTER — APPOINTMENT (OUTPATIENT)
Dept: CV DIAGNOSITCS | Facility: HOSPITAL | Age: 74
End: 2024-07-09

## 2024-07-09 ENCOUNTER — OUTPATIENT (OUTPATIENT)
Dept: OUTPATIENT SERVICES | Facility: HOSPITAL | Age: 74
LOS: 1 days | End: 2024-07-09
Payer: MEDICARE

## 2024-07-09 ENCOUNTER — RESULT REVIEW (OUTPATIENT)
Age: 74
End: 2024-07-09

## 2024-07-09 DIAGNOSIS — Z96.641 PRESENCE OF RIGHT ARTIFICIAL HIP JOINT: Chronic | ICD-10-CM

## 2024-07-09 DIAGNOSIS — I42.7 CARDIOMYOPATHY DUE TO DRUG AND EXTERNAL AGENT: ICD-10-CM

## 2024-07-09 DIAGNOSIS — Z98.890 OTHER SPECIFIED POSTPROCEDURAL STATES: Chronic | ICD-10-CM

## 2024-07-09 DIAGNOSIS — Z91.89 OTHER SPECIFIED PERSONAL RISK FACTORS, NOT ELSEWHERE CLASSIFIED: ICD-10-CM

## 2024-07-09 DIAGNOSIS — Z90.89 ACQUIRED ABSENCE OF OTHER ORGANS: Chronic | ICD-10-CM

## 2024-07-09 PROCEDURE — 93306 TTE W/DOPPLER COMPLETE: CPT | Mod: 26

## 2024-07-09 PROCEDURE — 93356 MYOCRD STRAIN IMG SPCKL TRCK: CPT

## 2024-07-10 ENCOUNTER — APPOINTMENT (OUTPATIENT)
Dept: HEMATOLOGY ONCOLOGY | Facility: CLINIC | Age: 74
End: 2024-07-10
Payer: MEDICARE

## 2024-07-10 VITALS
TEMPERATURE: 97.7 F | HEART RATE: 93 BPM | BODY MASS INDEX: 31.25 KG/M2 | RESPIRATION RATE: 16 BRPM | WEIGHT: 160 LBS | DIASTOLIC BLOOD PRESSURE: 72 MMHG | OXYGEN SATURATION: 98 % | SYSTOLIC BLOOD PRESSURE: 107 MMHG

## 2024-07-10 DIAGNOSIS — T45.1X5A PAIN IN UNSPECIFIED JOINT: ICD-10-CM

## 2024-07-10 DIAGNOSIS — M25.50 PAIN IN UNSPECIFIED JOINT: ICD-10-CM

## 2024-07-10 DIAGNOSIS — M85.80 OTHER SPECIFIED DISORDERS OF BONE DENSITY AND STRUCTURE, UNSPECIFIED SITE: ICD-10-CM

## 2024-07-10 LAB
CYTOLOGY CVX/VAG DOC THIN PREP: ABNORMAL
HPV HIGH+LOW RISK DNA PNL CVX: NOT DETECTED

## 2024-07-10 PROCEDURE — G2211 COMPLEX E/M VISIT ADD ON: CPT

## 2024-07-10 PROCEDURE — 99214 OFFICE O/P EST MOD 30 MIN: CPT

## 2024-07-19 ENCOUNTER — APPOINTMENT (OUTPATIENT)
Dept: CARDIOLOGY | Facility: CLINIC | Age: 74
End: 2024-07-19
Payer: MEDICARE

## 2024-07-19 VITALS
BODY MASS INDEX: 31.08 KG/M2 | HEIGHT: 60 IN | HEART RATE: 80 BPM | SYSTOLIC BLOOD PRESSURE: 127 MMHG | TEMPERATURE: 97.5 F | WEIGHT: 158.29 LBS | DIASTOLIC BLOOD PRESSURE: 78 MMHG | OXYGEN SATURATION: 98 %

## 2024-07-19 DIAGNOSIS — Z79.811 LONG TERM (CURRENT) USE OF AROMATASE INHIBITORS: ICD-10-CM

## 2024-07-19 DIAGNOSIS — Z79.899 OTHER LONG TERM (CURRENT) DRUG THERAPY: ICD-10-CM

## 2024-07-19 DIAGNOSIS — T45.1X5A NAUSEA WITH VOMITING, UNSPECIFIED: ICD-10-CM

## 2024-07-19 DIAGNOSIS — Z01.419 ENCOUNTER FOR GYNECOLOGICAL EXAMINATION (GENERAL) (ROUTINE) W/OUT ABNORMAL FINDINGS: ICD-10-CM

## 2024-07-19 DIAGNOSIS — I42.7 CARDIOMYOPATHY DUE TO DRUG AND EXTERNAL AGENT: ICD-10-CM

## 2024-07-19 DIAGNOSIS — M25.551 PAIN IN RIGHT HIP: ICD-10-CM

## 2024-07-19 DIAGNOSIS — Z78.9 OTHER SPECIFIED HEALTH STATUS: ICD-10-CM

## 2024-07-19 DIAGNOSIS — Z86.79 PERSONAL HISTORY OF OTHER DISEASES OF THE CIRCULATORY SYSTEM: ICD-10-CM

## 2024-07-19 DIAGNOSIS — C50.919 MALIGNANT NEOPLASM OF UNSPECIFIED SITE OF UNSPECIFIED FEMALE BREAST: ICD-10-CM

## 2024-07-19 DIAGNOSIS — Z87.2 PERSONAL HISTORY OF DISEASES OF THE SKIN AND SUBCUTANEOUS TISSUE: ICD-10-CM

## 2024-07-19 DIAGNOSIS — N90.89 OTHER SPECIFIED NONINFLAMMATORY DISORDERS OF VULVA AND PERINEUM: ICD-10-CM

## 2024-07-19 DIAGNOSIS — E78.00 PURE HYPERCHOLESTEROLEMIA, UNSPECIFIED: ICD-10-CM

## 2024-07-19 DIAGNOSIS — T45.1X5A CARDIOMYOPATHY DUE TO DRUG AND EXTERNAL AGENT: ICD-10-CM

## 2024-07-19 DIAGNOSIS — T45.1X5A TOXIC GASTROENTERITIS AND COLITIS: ICD-10-CM

## 2024-07-19 DIAGNOSIS — Z71.84 ENC FOR HEALTH COUNSELING RELATED TO TRAVEL: ICD-10-CM

## 2024-07-19 DIAGNOSIS — K52.1 TOXIC GASTROENTERITIS AND COLITIS: ICD-10-CM

## 2024-07-19 DIAGNOSIS — R11.2 NAUSEA WITH VOMITING, UNSPECIFIED: ICD-10-CM

## 2024-07-19 DIAGNOSIS — R21 RASH AND OTHER NONSPECIFIC SKIN ERUPTION: ICD-10-CM

## 2024-07-19 PROCEDURE — 99214 OFFICE O/P EST MOD 30 MIN: CPT

## 2024-07-19 PROCEDURE — 93010 ELECTROCARDIOGRAM REPORT: CPT

## 2024-07-19 PROCEDURE — 93000 ELECTROCARDIOGRAM COMPLETE: CPT

## 2024-07-19 PROCEDURE — G2211 COMPLEX E/M VISIT ADD ON: CPT

## 2024-09-04 NOTE — PATIENT PROFILE ADULT - FALL HARM RISK - ATTEMPT OOB
[de-identified] : In several years since last GI visit, Bia has been doing very well.  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  Since stopping her PERT, she has been doing just as well, without any abdominal pain, diarrhea, steatorrhea.  She has occasional constipation symptoms, estimating 1 bowel movement per week with straining and difficulty, ultimately able to complete a bowel movement.  She takes miralax periodically.   No

## 2024-09-17 NOTE — H&P PST ADULT - ENMT
-- DO NOT REPLY / DO NOT REPLY ALL --  -- This inbox is not monitored. If this was sent to the wrong provider or department, reroute message to P E[facility] Reroute pool. --  -- Message is from Engagement Center Operations ([facility]) --    Offered Waitlist if Available for the Visit Type? No    Caller is requesting an appointment.    Reason for Appointment Message:  Priority rescheduling request    Reason for Visit: Patient is returning from [place] dealing with cough / short of breath due to cough is looking for appointment Thursday or Friday     Is the patient currently scheduled? No    Preferred time to be seen: Latest appointment thursday due to arriving back in state or anytime Friday     Caller Information       Contact Date/Time Type Contact Phone/Fax    09/17/2024 05:11 PM CDT Phone (Incoming) CLARISSA WANG (Emergency Contact) [phone] (M)            Alternative phone number: none     Can a detailed message be left?  Yes - Voicemail   Patient has been advised the message will be addressed within 2-3 business days          mouth/pharynx/neck negative

## 2024-10-10 ENCOUNTER — LABORATORY RESULT (OUTPATIENT)
Age: 74
End: 2024-10-10

## 2024-10-10 ENCOUNTER — APPOINTMENT (OUTPATIENT)
Dept: CARDIOLOGY | Facility: CLINIC | Age: 74
End: 2024-10-10
Payer: MEDICARE

## 2024-10-10 VITALS
OXYGEN SATURATION: 97 % | BODY MASS INDEX: 30.63 KG/M2 | TEMPERATURE: 97.9 F | DIASTOLIC BLOOD PRESSURE: 77 MMHG | HEIGHT: 60 IN | HEART RATE: 91 BPM | WEIGHT: 156 LBS | RESPIRATION RATE: 16 BRPM | SYSTOLIC BLOOD PRESSURE: 126 MMHG

## 2024-10-10 DIAGNOSIS — E78.01 FAMILIAL HYPERCHOLESTEROLEMIA: ICD-10-CM

## 2024-10-10 DIAGNOSIS — Z78.9 OTHER SPECIFIED HEALTH STATUS: ICD-10-CM

## 2024-10-10 PROCEDURE — 99214 OFFICE O/P EST MOD 30 MIN: CPT

## 2024-10-10 PROCEDURE — G2211 COMPLEX E/M VISIT ADD ON: CPT

## 2024-10-17 ENCOUNTER — NON-APPOINTMENT (OUTPATIENT)
Age: 74
End: 2024-10-17

## 2024-10-23 ENCOUNTER — NON-APPOINTMENT (OUTPATIENT)
Age: 74
End: 2024-10-23

## 2024-11-18 ENCOUNTER — APPOINTMENT (OUTPATIENT)
Dept: CARDIOLOGY | Facility: CLINIC | Age: 74
End: 2024-11-18
Payer: MEDICARE

## 2024-11-18 VITALS
DIASTOLIC BLOOD PRESSURE: 78 MMHG | SYSTOLIC BLOOD PRESSURE: 125 MMHG | OXYGEN SATURATION: 97 % | HEIGHT: 60 IN | HEART RATE: 85 BPM | WEIGHT: 156 LBS | TEMPERATURE: 98.2 F | RESPIRATION RATE: 16 BRPM | BODY MASS INDEX: 30.63 KG/M2

## 2024-11-18 DIAGNOSIS — Z78.9 OTHER SPECIFIED HEALTH STATUS: ICD-10-CM

## 2024-11-18 DIAGNOSIS — E78.01 FAMILIAL HYPERCHOLESTEROLEMIA: ICD-10-CM

## 2024-11-18 DIAGNOSIS — T45.1X5A CARDIOMYOPATHY DUE TO DRUG AND EXTERNAL AGENT: ICD-10-CM

## 2024-11-18 DIAGNOSIS — I42.7 CARDIOMYOPATHY DUE TO DRUG AND EXTERNAL AGENT: ICD-10-CM

## 2024-11-18 PROCEDURE — 96372 THER/PROPH/DIAG INJ SC/IM: CPT

## 2024-11-18 PROCEDURE — 99214 OFFICE O/P EST MOD 30 MIN: CPT | Mod: 25

## 2024-11-18 RX ORDER — PRAVASTATIN SODIUM 40 MG/1
40 TABLET ORAL
Qty: 90 | Refills: 1 | Status: ACTIVE | COMMUNITY
Start: 2024-11-18 | End: 1900-01-01

## 2024-11-18 RX ORDER — INCLISIRAN 284 MG/1.5ML
284 INJECTION, SOLUTION SUBCUTANEOUS
Refills: 0 | Status: ACTIVE | COMMUNITY
Start: 2024-11-18

## 2024-11-18 RX ADMIN — INCLISIRAN 284 MG/1.5ML: 284 INJECTION, SOLUTION SUBCUTANEOUS at 00:00

## 2024-11-19 RX ORDER — INCLISIRAN 284 MG/1.5ML
284 INJECTION, SOLUTION SUBCUTANEOUS
Qty: 0 | Refills: 0 | Status: COMPLETED | OUTPATIENT
Start: 2024-11-18

## 2024-12-08 NOTE — BRIEF OPERATIVE NOTE - NSICDXBRIEFPROCEDURE_GEN_ALL_CORE_FT
shortness of breath PROCEDURES:  Partial mastectomy with sentinel node biopsy 28-Jun-2022 12:58:28  Tom Martinez

## 2024-12-09 ENCOUNTER — APPOINTMENT (OUTPATIENT)
Dept: ORTHOPEDIC SURGERY | Facility: CLINIC | Age: 74
End: 2024-12-09

## 2025-01-03 ENCOUNTER — OUTPATIENT (OUTPATIENT)
Dept: OUTPATIENT SERVICES | Facility: HOSPITAL | Age: 75
LOS: 1 days | Discharge: HOME | End: 2025-01-03

## 2025-01-03 DIAGNOSIS — Z90.89 ACQUIRED ABSENCE OF OTHER ORGANS: Chronic | ICD-10-CM

## 2025-01-03 DIAGNOSIS — Z98.890 OTHER SPECIFIED POSTPROCEDURAL STATES: Chronic | ICD-10-CM

## 2025-01-03 DIAGNOSIS — C50.919 MALIGNANT NEOPLASM OF UNSPECIFIED SITE OF UNSPECIFIED FEMALE BREAST: ICD-10-CM

## 2025-01-03 DIAGNOSIS — Z96.641 PRESENCE OF RIGHT ARTIFICIAL HIP JOINT: Chronic | ICD-10-CM

## 2025-01-06 ENCOUNTER — APPOINTMENT (OUTPATIENT)
Dept: SURGERY | Facility: CLINIC | Age: 75
End: 2025-01-06
Payer: MEDICARE

## 2025-01-06 PROCEDURE — 99213K: CUSTOM

## 2025-01-08 ENCOUNTER — APPOINTMENT (OUTPATIENT)
Dept: HEMATOLOGY ONCOLOGY | Facility: CLINIC | Age: 75
End: 2025-01-08
Payer: MEDICARE

## 2025-01-08 ENCOUNTER — NON-APPOINTMENT (OUTPATIENT)
Age: 75
End: 2025-01-08

## 2025-01-08 VITALS
TEMPERATURE: 96.9 F | DIASTOLIC BLOOD PRESSURE: 82 MMHG | SYSTOLIC BLOOD PRESSURE: 144 MMHG | RESPIRATION RATE: 16 BRPM | HEART RATE: 87 BPM | OXYGEN SATURATION: 98 % | BODY MASS INDEX: 31.43 KG/M2 | WEIGHT: 160.92 LBS

## 2025-01-08 DIAGNOSIS — M85.80 OTHER SPECIFIED DISORDERS OF BONE DENSITY AND STRUCTURE, UNSPECIFIED SITE: ICD-10-CM

## 2025-01-08 DIAGNOSIS — Z79.811 LONG TERM (CURRENT) USE OF AROMATASE INHIBITORS: ICD-10-CM

## 2025-01-08 PROCEDURE — G2211 COMPLEX E/M VISIT ADD ON: CPT

## 2025-01-08 PROCEDURE — 99214 OFFICE O/P EST MOD 30 MIN: CPT

## 2025-01-13 ENCOUNTER — OUTPATIENT (OUTPATIENT)
Dept: OUTPATIENT SERVICES | Facility: HOSPITAL | Age: 75
LOS: 1 days | End: 2025-01-13
Payer: MEDICARE

## 2025-01-13 ENCOUNTER — APPOINTMENT (OUTPATIENT)
Dept: MRI IMAGING | Facility: IMAGING CENTER | Age: 75
End: 2025-01-13
Payer: MEDICARE

## 2025-01-13 DIAGNOSIS — Z90.89 ACQUIRED ABSENCE OF OTHER ORGANS: Chronic | ICD-10-CM

## 2025-01-13 DIAGNOSIS — Z98.890 OTHER SPECIFIED POSTPROCEDURAL STATES: Chronic | ICD-10-CM

## 2025-01-13 DIAGNOSIS — Z96.641 PRESENCE OF RIGHT ARTIFICIAL HIP JOINT: Chronic | ICD-10-CM

## 2025-01-13 DIAGNOSIS — R92.30 DENSE BREASTS, UNSPECIFIED: ICD-10-CM

## 2025-01-13 PROCEDURE — C8908: CPT | Mod: MH

## 2025-01-13 PROCEDURE — 77049 MRI BREAST C-+ W/CAD BI: CPT | Mod: 26

## 2025-01-13 PROCEDURE — C8937: CPT

## 2025-01-13 PROCEDURE — A9585: CPT

## 2025-01-15 ENCOUNTER — APPOINTMENT (OUTPATIENT)
Dept: CV DIAGNOSITCS | Facility: HOSPITAL | Age: 75
End: 2025-01-15

## 2025-01-15 ENCOUNTER — OUTPATIENT (OUTPATIENT)
Dept: OUTPATIENT SERVICES | Facility: HOSPITAL | Age: 75
LOS: 1 days | End: 2025-01-15
Payer: MEDICARE

## 2025-01-15 ENCOUNTER — RESULT REVIEW (OUTPATIENT)
Age: 75
End: 2025-01-15

## 2025-01-15 DIAGNOSIS — Z96.641 PRESENCE OF RIGHT ARTIFICIAL HIP JOINT: Chronic | ICD-10-CM

## 2025-01-15 DIAGNOSIS — Z98.890 OTHER SPECIFIED POSTPROCEDURAL STATES: Chronic | ICD-10-CM

## 2025-01-15 DIAGNOSIS — E78.00 PURE HYPERCHOLESTEROLEMIA, UNSPECIFIED: ICD-10-CM

## 2025-01-15 DIAGNOSIS — Z90.89 ACQUIRED ABSENCE OF OTHER ORGANS: Chronic | ICD-10-CM

## 2025-01-15 DIAGNOSIS — I42.7 CARDIOMYOPATHY DUE TO DRUG AND EXTERNAL AGENT: ICD-10-CM

## 2025-01-15 PROCEDURE — 93306 TTE W/DOPPLER COMPLETE: CPT | Mod: 26

## 2025-01-15 PROCEDURE — 76376 3D RENDER W/INTRP POSTPROCES: CPT | Mod: 26

## 2025-01-15 PROCEDURE — 93356 MYOCRD STRAIN IMG SPCKL TRCK: CPT

## 2025-01-17 ENCOUNTER — APPOINTMENT (OUTPATIENT)
Dept: CARDIOLOGY | Facility: CLINIC | Age: 75
End: 2025-01-17

## 2025-01-17 VITALS
DIASTOLIC BLOOD PRESSURE: 66 MMHG | TEMPERATURE: 97.3 F | OXYGEN SATURATION: 96 % | BODY MASS INDEX: 31.6 KG/M2 | HEIGHT: 60 IN | SYSTOLIC BLOOD PRESSURE: 133 MMHG | WEIGHT: 160.93 LBS | HEART RATE: 68 BPM

## 2025-01-17 DIAGNOSIS — M16.11 UNILATERAL PRIMARY OSTEOARTHRITIS, RIGHT HIP: ICD-10-CM

## 2025-01-17 DIAGNOSIS — C50.919 MALIGNANT NEOPLASM OF UNSPECIFIED SITE OF UNSPECIFIED FEMALE BREAST: ICD-10-CM

## 2025-01-17 DIAGNOSIS — I49.9 CARDIAC ARRHYTHMIA, UNSPECIFIED: ICD-10-CM

## 2025-01-17 DIAGNOSIS — I42.7 CARDIOMYOPATHY DUE TO DRUG AND EXTERNAL AGENT: ICD-10-CM

## 2025-01-17 DIAGNOSIS — E78.01 FAMILIAL HYPERCHOLESTEROLEMIA: ICD-10-CM

## 2025-01-17 DIAGNOSIS — T45.1X5A CARDIOMYOPATHY DUE TO DRUG AND EXTERNAL AGENT: ICD-10-CM

## 2025-01-17 DIAGNOSIS — R53.83 OTHER FATIGUE: ICD-10-CM

## 2025-01-17 DIAGNOSIS — Z86.79 PERSONAL HISTORY OF OTHER DISEASES OF THE CIRCULATORY SYSTEM: ICD-10-CM

## 2025-01-17 PROCEDURE — 99214 OFFICE O/P EST MOD 30 MIN: CPT

## 2025-01-17 PROCEDURE — 93000 ELECTROCARDIOGRAM COMPLETE: CPT

## 2025-01-17 PROCEDURE — 93010 ELECTROCARDIOGRAM REPORT: CPT

## 2025-01-17 PROCEDURE — G2211 COMPLEX E/M VISIT ADD ON: CPT

## 2025-01-21 NOTE — REVIEW OF SYSTEMS
Future Appointments   Date Time Provider Department Center   1/27/2025  1:00 PM PFS XR RM1 PFS XRAY [facility] [place]   1/27/2025  1:30 PM PFS XR RM1 PFS XRAY [facility]   3/12/2025  9:20 AM Joshua Velasco MD [facility]       
[Negative] : Allergic/Immunologic

## 2025-02-13 ENCOUNTER — RX RENEWAL (OUTPATIENT)
Age: 75
End: 2025-02-13

## 2025-02-19 ENCOUNTER — LABORATORY RESULT (OUTPATIENT)
Age: 75
End: 2025-02-19

## 2025-02-19 ENCOUNTER — APPOINTMENT (OUTPATIENT)
Dept: CARDIOLOGY | Facility: CLINIC | Age: 75
End: 2025-02-19
Payer: MEDICARE

## 2025-02-19 VITALS
HEART RATE: 100 BPM | DIASTOLIC BLOOD PRESSURE: 80 MMHG | HEIGHT: 60 IN | RESPIRATION RATE: 16 BRPM | WEIGHT: 160 LBS | OXYGEN SATURATION: 97 % | SYSTOLIC BLOOD PRESSURE: 127 MMHG | TEMPERATURE: 97.9 F | BODY MASS INDEX: 31.41 KG/M2

## 2025-02-19 DIAGNOSIS — E78.01 FAMILIAL HYPERCHOLESTEROLEMIA: ICD-10-CM

## 2025-02-19 DIAGNOSIS — Z78.9 OTHER SPECIFIED HEALTH STATUS: ICD-10-CM

## 2025-02-19 PROCEDURE — 99214 OFFICE O/P EST MOD 30 MIN: CPT

## 2025-02-19 PROCEDURE — 96372 THER/PROPH/DIAG INJ SC/IM: CPT

## 2025-02-19 PROCEDURE — G2211 COMPLEX E/M VISIT ADD ON: CPT

## 2025-02-19 RX ADMIN — INCLISIRAN 284 MG/1.5ML: 284 INJECTION, SOLUTION SUBCUTANEOUS at 00:00

## 2025-02-25 RX ORDER — INCLISIRAN 284 MG/1.5ML
284 INJECTION, SOLUTION SUBCUTANEOUS
Qty: 0 | Refills: 0 | Status: COMPLETED | OUTPATIENT
Start: 2025-02-19

## 2025-07-20 ENCOUNTER — OUTPATIENT (OUTPATIENT)
Dept: OUTPATIENT SERVICES | Facility: HOSPITAL | Age: 75
LOS: 1 days | Discharge: HOME | End: 2025-07-20

## 2025-07-20 DIAGNOSIS — Z96.641 PRESENCE OF RIGHT ARTIFICIAL HIP JOINT: Chronic | ICD-10-CM

## 2025-07-20 DIAGNOSIS — Z98.890 OTHER SPECIFIED POSTPROCEDURAL STATES: Chronic | ICD-10-CM

## 2025-07-20 DIAGNOSIS — Z90.89 ACQUIRED ABSENCE OF OTHER ORGANS: Chronic | ICD-10-CM

## 2025-07-20 DIAGNOSIS — C50.919 MALIGNANT NEOPLASM OF UNSPECIFIED SITE OF UNSPECIFIED FEMALE BREAST: ICD-10-CM

## 2025-07-22 ENCOUNTER — APPOINTMENT (OUTPATIENT)
Dept: RADIOLOGY | Facility: IMAGING CENTER | Age: 75
End: 2025-07-22
Payer: MEDICARE

## 2025-07-22 ENCOUNTER — OUTPATIENT (OUTPATIENT)
Dept: OUTPATIENT SERVICES | Facility: HOSPITAL | Age: 75
LOS: 1 days | End: 2025-07-22
Payer: MEDICARE

## 2025-07-22 ENCOUNTER — APPOINTMENT (OUTPATIENT)
Dept: MAMMOGRAPHY | Facility: IMAGING CENTER | Age: 75
End: 2025-07-22
Payer: MEDICARE

## 2025-07-22 ENCOUNTER — APPOINTMENT (OUTPATIENT)
Dept: ULTRASOUND IMAGING | Facility: IMAGING CENTER | Age: 75
End: 2025-07-22
Payer: MEDICARE

## 2025-07-22 DIAGNOSIS — Z98.890 OTHER SPECIFIED POSTPROCEDURAL STATES: Chronic | ICD-10-CM

## 2025-07-22 DIAGNOSIS — Z79.811 LONG TERM (CURRENT) USE OF AROMATASE INHIBITORS: ICD-10-CM

## 2025-07-22 DIAGNOSIS — Z00.8 ENCOUNTER FOR OTHER GENERAL EXAMINATION: ICD-10-CM

## 2025-07-22 DIAGNOSIS — Z96.641 PRESENCE OF RIGHT ARTIFICIAL HIP JOINT: Chronic | ICD-10-CM

## 2025-07-22 DIAGNOSIS — M85.80 OTHER SPECIFIED DISORDERS OF BONE DENSITY AND STRUCTURE, UNSPECIFIED SITE: ICD-10-CM

## 2025-07-22 DIAGNOSIS — Z90.89 ACQUIRED ABSENCE OF OTHER ORGANS: Chronic | ICD-10-CM

## 2025-07-22 PROCEDURE — 77066 DX MAMMO INCL CAD BI: CPT | Mod: 26

## 2025-07-22 PROCEDURE — 77066 DX MAMMO INCL CAD BI: CPT

## 2025-07-22 PROCEDURE — 76641 ULTRASOUND BREAST COMPLETE: CPT

## 2025-07-22 PROCEDURE — 77080 DXA BONE DENSITY AXIAL: CPT | Mod: 26

## 2025-07-22 PROCEDURE — G0279: CPT

## 2025-07-22 PROCEDURE — G0279: CPT | Mod: 26

## 2025-07-22 PROCEDURE — 76641 ULTRASOUND BREAST COMPLETE: CPT | Mod: 26,50,GA

## 2025-07-22 PROCEDURE — 77080 DXA BONE DENSITY AXIAL: CPT

## 2025-07-23 ENCOUNTER — RESULT REVIEW (OUTPATIENT)
Age: 75
End: 2025-07-23

## 2025-07-23 ENCOUNTER — APPOINTMENT (OUTPATIENT)
Dept: HEMATOLOGY ONCOLOGY | Facility: CLINIC | Age: 75
End: 2025-07-23
Payer: MEDICARE

## 2025-07-23 VITALS
HEART RATE: 77 BPM | RESPIRATION RATE: 14 BRPM | BODY MASS INDEX: 30.36 KG/M2 | HEIGHT: 60.63 IN | WEIGHT: 158.73 LBS | OXYGEN SATURATION: 93 % | TEMPERATURE: 98.7 F | SYSTOLIC BLOOD PRESSURE: 138 MMHG | DIASTOLIC BLOOD PRESSURE: 90 MMHG

## 2025-07-23 DIAGNOSIS — C50.919 MALIGNANT NEOPLASM OF UNSPECIFIED SITE OF UNSPECIFIED FEMALE BREAST: ICD-10-CM

## 2025-07-23 DIAGNOSIS — M85.80 OTHER SPECIFIED DISORDERS OF BONE DENSITY AND STRUCTURE, UNSPECIFIED SITE: ICD-10-CM

## 2025-07-23 DIAGNOSIS — I42.7 CARDIOMYOPATHY DUE TO DRUG AND EXTERNAL AGENT: ICD-10-CM

## 2025-07-23 DIAGNOSIS — T45.1X5A DRUG-INDUCED POLYNEUROPATHY: ICD-10-CM

## 2025-07-23 DIAGNOSIS — G62.0 DRUG-INDUCED POLYNEUROPATHY: ICD-10-CM

## 2025-07-23 DIAGNOSIS — T45.1X5A CARDIOMYOPATHY DUE TO DRUG AND EXTERNAL AGENT: ICD-10-CM

## 2025-07-23 LAB
BASOPHILS # BLD AUTO: 0.03 K/UL — SIGNIFICANT CHANGE UP (ref 0–0.2)
BASOPHILS NFR BLD AUTO: 0.6 % — SIGNIFICANT CHANGE UP (ref 0–2)
EOSINOPHIL # BLD AUTO: 0.11 K/UL — SIGNIFICANT CHANGE UP (ref 0–0.5)
EOSINOPHIL NFR BLD AUTO: 2.2 % — SIGNIFICANT CHANGE UP (ref 0–6)
HCT VFR BLD CALC: 45 % — SIGNIFICANT CHANGE UP (ref 34.5–45)
HGB BLD-MCNC: 15 G/DL — SIGNIFICANT CHANGE UP (ref 11.5–15.5)
IMM GRANULOCYTES NFR BLD AUTO: 0.2 % — SIGNIFICANT CHANGE UP (ref 0–0.9)
LYMPHOCYTES # BLD AUTO: 2.15 K/UL — SIGNIFICANT CHANGE UP (ref 1–3.3)
LYMPHOCYTES # BLD AUTO: 42.4 % — SIGNIFICANT CHANGE UP (ref 13–44)
MCHC RBC-ENTMCNC: 32.6 PG — SIGNIFICANT CHANGE UP (ref 27–34)
MCHC RBC-ENTMCNC: 33.3 G/DL — SIGNIFICANT CHANGE UP (ref 32–36)
MCV RBC AUTO: 97.8 FL — SIGNIFICANT CHANGE UP (ref 80–100)
MONOCYTES # BLD AUTO: 0.58 K/UL — SIGNIFICANT CHANGE UP (ref 0–0.9)
MONOCYTES NFR BLD AUTO: 11.4 % — SIGNIFICANT CHANGE UP (ref 2–14)
NEUTROPHILS # BLD AUTO: 2.19 K/UL — SIGNIFICANT CHANGE UP (ref 1.8–7.4)
NEUTROPHILS NFR BLD AUTO: 43.2 % — SIGNIFICANT CHANGE UP (ref 43–77)
NRBC BLD AUTO-RTO: 0 /100 WBCS — SIGNIFICANT CHANGE UP (ref 0–0)
PLATELET # BLD AUTO: 200 K/UL — SIGNIFICANT CHANGE UP (ref 150–400)
RBC # BLD: 4.6 M/UL — SIGNIFICANT CHANGE UP (ref 3.8–5.2)
RBC # FLD: 13.3 % — SIGNIFICANT CHANGE UP (ref 10.3–14.5)
WBC # BLD: 5.07 K/UL — SIGNIFICANT CHANGE UP (ref 3.8–10.5)
WBC # FLD AUTO: 5.07 K/UL — SIGNIFICANT CHANGE UP (ref 3.8–10.5)

## 2025-07-23 PROCEDURE — G2211 COMPLEX E/M VISIT ADD ON: CPT

## 2025-07-23 PROCEDURE — 99214 OFFICE O/P EST MOD 30 MIN: CPT

## 2025-07-24 LAB
ALBUMIN SERPL ELPH-MCNC: 4.6 G/DL
ALP BLD-CCNC: 88 U/L
ALT SERPL-CCNC: 28 U/L
ANION GAP SERPL CALC-SCNC: 14 MMOL/L
AST SERPL-CCNC: 26 U/L
BILIRUB SERPL-MCNC: 0.4 MG/DL
BUN SERPL-MCNC: 15 MG/DL
CALCIUM SERPL-MCNC: 10 MG/DL
CANCER AG27-29 SERPL-ACNC: 33.8 U/ML
CEA SERPL-MCNC: 5.3 NG/ML
CHLORIDE SERPL-SCNC: 101 MMOL/L
CO2 SERPL-SCNC: 23 MMOL/L
CREAT SERPL-MCNC: 0.76 MG/DL
EGFRCR SERPLBLD CKD-EPI 2021: 81 ML/MIN/1.73M2
GLUCOSE SERPL-MCNC: 86 MG/DL
POTASSIUM SERPL-SCNC: 4.6 MMOL/L
PROT SERPL-MCNC: 7.5 G/DL
SODIUM SERPL-SCNC: 138 MMOL/L

## 2025-08-19 ENCOUNTER — APPOINTMENT (OUTPATIENT)
Dept: CARDIOLOGY | Facility: CLINIC | Age: 75
End: 2025-08-19
Payer: MEDICARE

## 2025-08-19 ENCOUNTER — LABORATORY RESULT (OUTPATIENT)
Age: 75
End: 2025-08-19

## 2025-08-19 VITALS
WEIGHT: 158 LBS | BODY MASS INDEX: 31.02 KG/M2 | OXYGEN SATURATION: 96 % | TEMPERATURE: 98 F | HEIGHT: 60 IN | HEART RATE: 100 BPM | SYSTOLIC BLOOD PRESSURE: 129 MMHG | RESPIRATION RATE: 16 BRPM | DIASTOLIC BLOOD PRESSURE: 79 MMHG

## 2025-08-19 DIAGNOSIS — Z78.9 OTHER SPECIFIED HEALTH STATUS: ICD-10-CM

## 2025-08-19 DIAGNOSIS — E78.01 FAMILIAL HYPERCHOLESTEROLEMIA: ICD-10-CM

## 2025-08-19 PROCEDURE — 99214 OFFICE O/P EST MOD 30 MIN: CPT | Mod: 25

## 2025-08-19 PROCEDURE — 96372 THER/PROPH/DIAG INJ SC/IM: CPT

## 2025-08-19 PROCEDURE — ZZZZZ: CPT | Mod: JZ

## 2025-08-19 RX ORDER — INCLISIRAN 284 MG/1.5ML
284 INJECTION, SOLUTION SUBCUTANEOUS
Qty: 0 | Refills: 0 | Status: COMPLETED | OUTPATIENT
Start: 2025-08-19

## 2025-08-19 RX ADMIN — INCLISIRAN 284 MG/1.5ML: 284 INJECTION, SOLUTION SUBCUTANEOUS at 00:00

## (undated) DEVICE — PACK TOTAL HIP CUST

## (undated) DEVICE — SHEATH SURG GUIDE SCOUT DISP STRL

## (undated) DEVICE — DRAPE SHEET XL 77X98"

## (undated) DEVICE — NDL SPINAL 18G X 3.5"

## (undated) DEVICE — SAW BLADE STRYKER SAGITTAL AGGRESSIVE 25X86.5X1.32MM

## (undated) DEVICE — SUT DERMABOND PRINEO 60CM

## (undated) DEVICE — WARMING BLANKET LOWER ADULT

## (undated) DEVICE — POSITIONER STRAP ARMBOARD VELCRO TS-30

## (undated) DEVICE — ELCTR PENCIL NEPTUNE SMOKE EVACUATION

## (undated) DEVICE — SEALER BIPOLAR 6.0 AQUAMANTYS

## (undated) DEVICE — PROBE LOCALIZER W/ DRAPES

## (undated) DEVICE — SPECIMEN CONTAINER 100ML

## (undated) DEVICE — SOL IRR POUR NS 0.9% 500ML

## (undated) DEVICE — DRSG COBAN 6"

## (undated) DEVICE — DRAPE C ARM 41X140"

## (undated) DEVICE — DRSG TAPE MICROPORE 1"

## (undated) DEVICE — SYR LUER LOK 50CC

## (undated) DEVICE — WARMING BLANKET FULL UNDERBODY

## (undated) DEVICE — POSITIONER POSITIONING ROLL  5X17"

## (undated) DEVICE — SYM-STRYKER SYSTEM 7: Type: DURABLE MEDICAL EQUIPMENT

## (undated) DEVICE — DRAPE MAYO STAND 30"

## (undated) DEVICE — SUT MONOCRYL 4-0 27" PS-2 UNDYED

## (undated) DEVICE — DRAPE BILATERAL LIMB 72X120"

## (undated) DEVICE — SOL IRR BAG NS 0.9% 3000ML

## (undated) DEVICE — DRSG STOCKINETTE TUBULAR COTTON 2PLY 6X72"

## (undated) DEVICE — DRAPE IOBAN 10X5"

## (undated) DEVICE — BLANKET WARMER UPPER ADULT

## (undated) DEVICE — DRAPE TOP SHEET 53"X101"

## (undated) DEVICE — SUT STRATAFIX SPIRAL MONOCRYL PLUS 4-0 14CM PS-2 UNDYED

## (undated) DEVICE — PACK BREAST MINOR

## (undated) DEVICE — WRAP COMPRESSION CALF MED

## (undated) DEVICE — SOL IRR POUR H2O 1500ML

## (undated) DEVICE — ELCTR GROUNDING PAD ADULT COVIDIEN

## (undated) DEVICE — SOL IRR POUR H2O 500ML

## (undated) DEVICE — SUT VICRYL 3-0 27" SH UNDYED

## (undated) DEVICE — DRSG STOCKINETTE IMPERVIOUS XL

## (undated) DEVICE — DRSG COMBINE 5X9"

## (undated) DEVICE — IRRISEPT JET LAVAGE W 0.05 PCT CHG

## (undated) DEVICE — HOOD FLYTE STRYKER HELMET SHIELD

## (undated) DEVICE — GLV 6 PROTEXIS (WHITE)

## (undated) DEVICE — SUT VICRYL PLUS 1 27" CP UNDYED

## (undated) DEVICE — ELCTR BOVIE PENCIL SMOKE EVACUATION